# Patient Record
Sex: FEMALE | Race: BLACK OR AFRICAN AMERICAN | Employment: OTHER | URBAN - METROPOLITAN AREA
[De-identification: names, ages, dates, MRNs, and addresses within clinical notes are randomized per-mention and may not be internally consistent; named-entity substitution may affect disease eponyms.]

---

## 2017-05-25 ENCOUNTER — HOSPITAL ENCOUNTER (EMERGENCY)
Age: 82
Discharge: HOME | End: 2017-05-25
Payer: MEDICARE

## 2017-05-25 ENCOUNTER — APPOINTMENT (OUTPATIENT)
Dept: CT IMAGING | Age: 82
End: 2017-05-25
Payer: MEDICARE

## 2017-05-25 VITALS
HEART RATE: 62 BPM | RESPIRATION RATE: 16 BRPM | TEMPERATURE: 98.7 F | DIASTOLIC BLOOD PRESSURE: 77 MMHG | SYSTOLIC BLOOD PRESSURE: 158 MMHG | OXYGEN SATURATION: 100 %

## 2017-05-25 DIAGNOSIS — R31.9 HEMATURIA: Primary | ICD-10-CM

## 2017-05-25 LAB
ANION GAP BLD CALC-SCNC: 10 MMOL/L (ref 7–16)
APPEARANCE UR: ABNORMAL
APTT PPP: 24.7 SEC (ref 23.5–31.7)
BACTERIA URNS QL MICRO: 0 /HPF
BASOPHILS # BLD AUTO: 0 K/UL (ref 0–0.2)
BASOPHILS # BLD: 1 % (ref 0–2)
BILIRUB UR QL: NEGATIVE
BUN SERPL-MCNC: 16 MG/DL (ref 8–23)
CALCIUM SERPL-MCNC: 9.5 MG/DL (ref 8.3–10.4)
CASTS URNS QL MICRO: 0 /LPF
CHLORIDE SERPL-SCNC: 105 MMOL/L (ref 98–107)
CO2 SERPL-SCNC: 27 MMOL/L (ref 21–32)
COLOR UR: ABNORMAL
CREAT SERPL-MCNC: 1.67 MG/DL (ref 0.6–1)
DIFFERENTIAL METHOD BLD: ABNORMAL
EOSINOPHIL # BLD: 0 K/UL (ref 0–0.8)
EOSINOPHIL NFR BLD: 1 % (ref 0.5–7.8)
EPI CELLS #/AREA URNS HPF: ABNORMAL /HPF
ERYTHROCYTE [DISTWIDTH] IN BLOOD BY AUTOMATED COUNT: 14.3 % (ref 11.9–14.6)
GLUCOSE SERPL-MCNC: 83 MG/DL (ref 65–100)
GLUCOSE UR STRIP.AUTO-MCNC: NEGATIVE MG/DL
HCT VFR BLD AUTO: 35.7 % (ref 35.8–46.3)
HGB BLD-MCNC: 11.6 G/DL (ref 11.7–15.4)
HGB UR QL STRIP: ABNORMAL
IMM GRANULOCYTES # BLD: 0 K/UL (ref 0–0.5)
IMM GRANULOCYTES NFR BLD AUTO: 0.2 % (ref 0–5)
INR PPP: 1 (ref 0.9–1.2)
KETONES UR QL STRIP.AUTO: NEGATIVE MG/DL
LEUKOCYTE ESTERASE UR QL STRIP.AUTO: ABNORMAL
LYMPHOCYTES # BLD AUTO: 33 % (ref 13–44)
LYMPHOCYTES # BLD: 1.7 K/UL (ref 0.5–4.6)
MCH RBC QN AUTO: 28.4 PG (ref 26.1–32.9)
MCHC RBC AUTO-ENTMCNC: 32.5 G/DL (ref 31.4–35)
MCV RBC AUTO: 87.5 FL (ref 79.6–97.8)
MONOCYTES # BLD: 0.4 K/UL (ref 0.1–1.3)
MONOCYTES NFR BLD AUTO: 8 % (ref 4–12)
NEUTS SEG # BLD: 3 K/UL (ref 1.7–8.2)
NEUTS SEG NFR BLD AUTO: 57 % (ref 43–78)
NITRITE UR QL STRIP.AUTO: NEGATIVE
PH UR STRIP: 6 [PH] (ref 5–9)
PLATELET # BLD AUTO: 226 K/UL (ref 150–450)
PMV BLD AUTO: 11.1 FL (ref 10.8–14.1)
POTASSIUM SERPL-SCNC: 4.7 MMOL/L (ref 3.5–5.1)
PROT UR STRIP-MCNC: 100 MG/DL
PROTHROMBIN TIME: 10.4 SEC (ref 9.6–12)
RBC # BLD AUTO: 4.08 M/UL (ref 4.05–5.25)
RBC #/AREA URNS HPF: >100 /HPF
SODIUM SERPL-SCNC: 142 MMOL/L (ref 136–145)
SP GR UR REFRACTOMETRY: 1.01 (ref 1–1.02)
UROBILINOGEN UR QL STRIP.AUTO: 1 EU/DL (ref 0.2–1)
WBC # BLD AUTO: 5.2 K/UL (ref 4.3–11.1)
WBC URNS QL MICRO: ABNORMAL /HPF

## 2017-05-25 PROCEDURE — 74176 CT ABD & PELVIS W/O CONTRAST: CPT

## 2017-05-25 PROCEDURE — 85025 COMPLETE CBC W/AUTO DIFF WBC: CPT

## 2017-05-25 PROCEDURE — 81001 URINALYSIS AUTO W/SCOPE: CPT

## 2017-05-25 PROCEDURE — 80048 BASIC METABOLIC PNL TOTAL CA: CPT

## 2017-05-25 PROCEDURE — 99283 EMERGENCY DEPT VISIT LOW MDM: CPT

## 2017-05-25 PROCEDURE — 85610 PROTHROMBIN TIME: CPT

## 2017-05-25 PROCEDURE — 51701 INSERT BLADDER CATHETER: CPT

## 2017-05-25 PROCEDURE — 85730 THROMBOPLASTIN TIME PARTIAL: CPT

## 2017-05-25 NOTE — ED NOTES
Pt straight cathed for urine specimen. Pt bladder drained with approx 300 mL of urine out. When catheter was being removed blood was noted to be draining into cath tubing and urine collection bag.

## 2017-05-25 NOTE — ED TRIAGE NOTES
Pt states she is on bactrim from UTI diagnosed at Urgent care. She states she continues to have blood clots in urine. When asked if blood was coming from vagina or rectum pt isn't sure, states she knows when she pees that sometimes she has blood there.

## 2017-05-25 NOTE — DISCHARGE INSTRUCTIONS

## 2017-05-25 NOTE — ED PROVIDER NOTES
HPI Comments: 51-year-old female complaining of dysuria. Patient thinks she has another urinary tract infection. She was recently treated for urinary tract infection. She also has noted some blood in her urine. Patient is a 80 y.o. female presenting with urinary tract infection. The history is provided by the patient. Bladder Infection    This is a recurrent problem. The current episode started more than 2 days ago. The problem occurs every urination. The problem has not changed since onset. The quality of the pain is described as aching. The pain is at a severity of 5/10. The pain is moderate. There has been no fever. There is no history of pyelonephritis. Associated symptoms include hematuria and hesitancy. Pertinent negatives include no chills, no sweats and no nausea. She has tried antibiotics for the symptoms. Her past medical history is significant for recurrent UTIs. Past Medical History:   Diagnosis Date    Arthritis     CAD (coronary artery disease)     cholesterol    Endocrine disease     thyroid issues    Gastrointestinal disorder     IBS    Hypertension     Other ill-defined conditions     short term memory loss    Other ill-defined conditions     osteoporosis, sciatica; spinal fx    Psychiatric disorder     ANXIETY       Past Surgical History:   Procedure Laterality Date    CARDIAC SURG PROCEDURE UNLIST      ablation    HX HEENT      laser surgery on eyes    HX HYSTERECTOMY      tubal          History reviewed. No pertinent family history. Social History     Social History    Marital status:      Spouse name: N/A    Number of children: N/A    Years of education: N/A     Occupational History    Not on file.      Social History Main Topics    Smoking status: Never Smoker    Smokeless tobacco: Never Used    Alcohol use No    Drug use: No    Sexual activity: No     Other Topics Concern    Not on file     Social History Narrative         ALLERGIES: Review of patient's allergies indicates no known allergies. Review of Systems   Constitutional: Negative. Negative for activity change and chills. HENT: Negative. Eyes: Negative. Respiratory: Negative. Cardiovascular: Negative. Gastrointestinal: Negative. Negative for nausea. Genitourinary: Positive for hematuria and hesitancy. Musculoskeletal: Negative. Skin: Negative. Neurological: Negative. Psychiatric/Behavioral: Negative. All other systems reviewed and are negative. Vitals:    05/25/17 1136 05/25/17 1140   BP: 160/78 157/74   Pulse: 64    Resp: 16 16   Temp: 98.9 °F (37.2 °C)    SpO2: 99% 99%            Physical Exam   Constitutional: She is oriented to person, place, and time. She appears well-developed and well-nourished. No distress. HENT:   Head: Normocephalic and atraumatic. Right Ear: External ear normal.   Left Ear: External ear normal.   Nose: Nose normal.   Mouth/Throat: Oropharynx is clear and moist. No oropharyngeal exudate. Eyes: Conjunctivae and EOM are normal. Pupils are equal, round, and reactive to light. Right eye exhibits no discharge. Left eye exhibits no discharge. No scleral icterus. Neck: Normal range of motion. Neck supple. No JVD present. No tracheal deviation present. Cardiovascular: Normal rate, regular rhythm and intact distal pulses. Pulmonary/Chest: Effort normal and breath sounds normal. No stridor. No respiratory distress. She has no wheezes. She exhibits no tenderness. Abdominal: Soft. Bowel sounds are normal. She exhibits no distension and no mass. There is tenderness in the suprapubic area. There is no rigidity, no rebound, no guarding, no tenderness at McBurney's point and negative Marmolejo's sign. Musculoskeletal: Normal range of motion. She exhibits no edema or tenderness. Neurological: She is alert and oriented to person, place, and time. No cranial nerve deficit. Skin: Skin is warm and dry. No rash noted.  She is not diaphoretic. No erythema. No pallor. Psychiatric: She has a normal mood and affect. Her behavior is normal. Thought content normal.   Nursing note and vitals reviewed. MDM  Number of Diagnoses or Management Options  Hematuria:   Diagnosis management comments: Hematuria and recurrent UTIs referred to urology.        Amount and/or Complexity of Data Reviewed  Clinical lab tests: ordered and reviewed      ED Course       Procedures

## 2017-05-25 NOTE — ED NOTES
Pt in bathroom, states it's going to be a while before she is out. She is attempting to provide urine sample. Pt advised that she could go to room and try again in a little bit.

## 2017-06-13 ENCOUNTER — HOSPITAL ENCOUNTER (OUTPATIENT)
Dept: SURGERY | Age: 82
Discharge: HOME | End: 2017-06-13
Payer: MEDICARE

## 2017-06-13 VITALS
TEMPERATURE: 98.6 F | BODY MASS INDEX: 28.2 KG/M2 | HEART RATE: 68 BPM | WEIGHT: 165.19 LBS | DIASTOLIC BLOOD PRESSURE: 93 MMHG | SYSTOLIC BLOOD PRESSURE: 212 MMHG | RESPIRATION RATE: 16 BRPM | HEIGHT: 64 IN | OXYGEN SATURATION: 99 %

## 2017-06-13 LAB
ANION GAP BLD CALC-SCNC: 6 MMOL/L (ref 7–16)
BUN SERPL-MCNC: 14 MG/DL (ref 8–23)
CALCIUM SERPL-MCNC: 9.3 MG/DL (ref 8.3–10.4)
CHLORIDE SERPL-SCNC: 105 MMOL/L (ref 98–107)
CO2 SERPL-SCNC: 34 MMOL/L (ref 21–32)
CREAT SERPL-MCNC: 1.22 MG/DL (ref 0.6–1)
ERYTHROCYTE [DISTWIDTH] IN BLOOD BY AUTOMATED COUNT: 14.4 % (ref 11.9–14.6)
GLUCOSE SERPL-MCNC: 119 MG/DL (ref 65–100)
HCT VFR BLD AUTO: 34.3 % (ref 35.8–46.3)
HGB BLD-MCNC: 11 G/DL (ref 11.7–15.4)
INR PPP: 1 (ref 0.9–1.2)
MCH RBC QN AUTO: 28.1 PG (ref 26.1–32.9)
MCHC RBC AUTO-ENTMCNC: 32.1 G/DL (ref 31.4–35)
MCV RBC AUTO: 87.7 FL (ref 79.6–97.8)
PLATELET # BLD AUTO: 238 K/UL (ref 150–450)
PMV BLD AUTO: 10.6 FL (ref 10.8–14.1)
POTASSIUM SERPL-SCNC: 4.2 MMOL/L (ref 3.5–5.1)
PROTHROMBIN TIME: 10.4 SEC (ref 9.6–12)
RBC # BLD AUTO: 3.91 M/UL (ref 4.05–5.25)
SODIUM SERPL-SCNC: 145 MMOL/L (ref 136–145)
WBC # BLD AUTO: 6.1 K/UL (ref 4.3–11.1)

## 2017-06-13 PROCEDURE — 80048 BASIC METABOLIC PNL TOTAL CA: CPT | Performed by: UROLOGY

## 2017-06-13 PROCEDURE — 85027 COMPLETE CBC AUTOMATED: CPT | Performed by: UROLOGY

## 2017-06-13 PROCEDURE — 85610 PROTHROMBIN TIME: CPT | Performed by: UROLOGY

## 2017-06-13 RX ORDER — DONEPEZIL HYDROCHLORIDE 5 MG/1
5 TABLET, FILM COATED ORAL
COMMUNITY
End: 2020-05-20

## 2017-06-13 NOTE — PERIOP NOTES
Patient verified name, , and surgery as listed in [facility]. TYPE  CASE:1b  Orders per surgeon: were Received  Labs per surgeon:CBC,BMP,INR  Labs per anesthesia protocol: no added  EKG  :  14      Patient provided with handouts including guide to surgery , transfusions, pain management and hand hygiene for the family and community. Pt verbalizes understanding of all pre-op instructions . Instructed that family must be present in building at all times. Nothing to eat or drink after midnight the night prior to surgery. Hibiclens and instructions given per hospital policy. Instructed patient to continue  previous medications as prescribed prior to surgery and  to take the following medications the day of surgery according to anesthesia guidelines : Amlodipine, Gabapentin, Levothyroxine, Metoprolol, Venlafaxine. Original medication prescription bottles were visualized during patient appointment. Continue all previous medications unless otherwise directed. Instructed patient to hold  the following medications prior to surgery: none      Patient verbalized understanding of all instructions and provided all medical/health information to the best of their ability.

## 2017-06-13 NOTE — PERIOP NOTES
Patient forgot to take her blood pressure medication this morning with her initial blood pressure reading of 212/93. After an hour in preassessment, patient's blood pressure trending down to 175/96. Patient states she will monitor blood pressure tonight and over the next couple of days and will see her Primary Physician if her blood pressure remains elevated.

## 2017-06-19 ENCOUNTER — ANESTHESIA EVENT (OUTPATIENT)
Dept: SURGERY | Age: 82
End: 2017-06-19
Payer: MEDICARE

## 2017-06-20 ENCOUNTER — ANESTHESIA (OUTPATIENT)
Dept: SURGERY | Age: 82
End: 2017-06-20
Payer: MEDICARE

## 2017-06-20 ENCOUNTER — HOSPITAL ENCOUNTER (OUTPATIENT)
Age: 82
Setting detail: OUTPATIENT SURGERY
Discharge: HOME | End: 2017-06-20
Attending: UROLOGY | Admitting: UROLOGY
Payer: MEDICARE

## 2017-06-20 VITALS
SYSTOLIC BLOOD PRESSURE: 179 MMHG | OXYGEN SATURATION: 95 % | HEIGHT: 64 IN | HEART RATE: 76 BPM | RESPIRATION RATE: 12 BRPM | DIASTOLIC BLOOD PRESSURE: 76 MMHG | WEIGHT: 163.38 LBS | TEMPERATURE: 97.9 F | BODY MASS INDEX: 27.89 KG/M2

## 2017-06-20 PROCEDURE — 76210000006 HC OR PH I REC 0.5 TO 1 HR: Performed by: UROLOGY

## 2017-06-20 PROCEDURE — 76210000020 HC REC RM PH II FIRST 0.5 HR: Performed by: UROLOGY

## 2017-06-20 PROCEDURE — 77030018842 HC SOL IRR SOD CL 9% BAXT -A: Performed by: UROLOGY

## 2017-06-20 PROCEDURE — 77030020782 HC GWN BAIR PAWS FLX 3M -B: Performed by: NURSE ANESTHETIST, CERTIFIED REGISTERED

## 2017-06-20 PROCEDURE — 76060000032 HC ANESTHESIA 0.5 TO 1 HR: Performed by: UROLOGY

## 2017-06-20 PROCEDURE — 74011250636 HC RX REV CODE- 250/636

## 2017-06-20 PROCEDURE — 88112 CYTOPATH CELL ENHANCE TECH: CPT | Performed by: UROLOGY

## 2017-06-20 PROCEDURE — 77030032490 HC SLV COMPR SCD KNE COVD -B: Performed by: UROLOGY

## 2017-06-20 PROCEDURE — 77030018832 HC SOL IRR H20 ICUM -A: Performed by: UROLOGY

## 2017-06-20 PROCEDURE — 74011250636 HC RX REV CODE- 250/636: Performed by: UROLOGY

## 2017-06-20 PROCEDURE — 74011250636 HC RX REV CODE- 250/636: Performed by: ANESTHESIOLOGY

## 2017-06-20 PROCEDURE — 74011636320 HC RX REV CODE- 636/320: Performed by: UROLOGY

## 2017-06-20 PROCEDURE — 74011000250 HC RX REV CODE- 250

## 2017-06-20 PROCEDURE — 76010000138 HC OR TIME 0.5 TO 1 HR: Performed by: UROLOGY

## 2017-06-20 PROCEDURE — 77030020143 HC AIRWY LARYN INTUB CGAS -A: Performed by: NURSE ANESTHETIST, CERTIFIED REGISTERED

## 2017-06-20 PROCEDURE — C1758 CATHETER, URETERAL: HCPCS | Performed by: UROLOGY

## 2017-06-20 RX ORDER — MIDAZOLAM HYDROCHLORIDE 1 MG/ML
2 INJECTION, SOLUTION INTRAMUSCULAR; INTRAVENOUS ONCE
Status: DISCONTINUED | OUTPATIENT
Start: 2017-06-20 | End: 2017-06-20 | Stop reason: HOSPADM

## 2017-06-20 RX ORDER — ALBUTEROL SULFATE 0.83 MG/ML
2.5 SOLUTION RESPIRATORY (INHALATION) AS NEEDED
Status: DISCONTINUED | OUTPATIENT
Start: 2017-06-20 | End: 2017-06-20 | Stop reason: HOSPADM

## 2017-06-20 RX ORDER — SODIUM CHLORIDE 0.9 % (FLUSH) 0.9 %
5-10 SYRINGE (ML) INJECTION AS NEEDED
Status: DISCONTINUED | OUTPATIENT
Start: 2017-06-20 | End: 2017-06-20 | Stop reason: HOSPADM

## 2017-06-20 RX ORDER — OXYCODONE HYDROCHLORIDE 5 MG/1
5 TABLET ORAL
Status: DISCONTINUED | OUTPATIENT
Start: 2017-06-20 | End: 2017-06-20 | Stop reason: HOSPADM

## 2017-06-20 RX ORDER — LIDOCAINE HYDROCHLORIDE 20 MG/ML
INJECTION, SOLUTION EPIDURAL; INFILTRATION; INTRACAUDAL; PERINEURAL AS NEEDED
Status: DISCONTINUED | OUTPATIENT
Start: 2017-06-20 | End: 2017-06-20 | Stop reason: HOSPADM

## 2017-06-20 RX ORDER — CEPHALEXIN 500 MG/1
500 CAPSULE ORAL 2 TIMES DAILY
Qty: 10 CAP | Refills: 0 | Status: SHIPPED | OUTPATIENT
Start: 2017-06-20 | End: 2017-06-25

## 2017-06-20 RX ORDER — CELECOXIB 200 MG/1
200 CAPSULE ORAL
Status: DISCONTINUED | OUTPATIENT
Start: 2017-06-20 | End: 2017-06-20

## 2017-06-20 RX ORDER — HYDROMORPHONE HYDROCHLORIDE 2 MG/ML
0.5 INJECTION, SOLUTION INTRAMUSCULAR; INTRAVENOUS; SUBCUTANEOUS
Status: DISCONTINUED | OUTPATIENT
Start: 2017-06-20 | End: 2017-06-20 | Stop reason: HOSPADM

## 2017-06-20 RX ORDER — FENTANYL CITRATE 50 UG/ML
100 INJECTION, SOLUTION INTRAMUSCULAR; INTRAVENOUS ONCE
Status: DISCONTINUED | OUTPATIENT
Start: 2017-06-20 | End: 2017-06-20 | Stop reason: HOSPADM

## 2017-06-20 RX ORDER — SODIUM CHLORIDE, SODIUM LACTATE, POTASSIUM CHLORIDE, CALCIUM CHLORIDE 600; 310; 30; 20 MG/100ML; MG/100ML; MG/100ML; MG/100ML
50 INJECTION, SOLUTION INTRAVENOUS CONTINUOUS
Status: DISCONTINUED | OUTPATIENT
Start: 2017-06-20 | End: 2017-06-20 | Stop reason: HOSPADM

## 2017-06-20 RX ORDER — CEFAZOLIN SODIUM IN 0.9 % NACL 2 G/50 ML
2 INTRAVENOUS SOLUTION, PIGGYBACK (ML) INTRAVENOUS ONCE
Status: COMPLETED | OUTPATIENT
Start: 2017-06-20 | End: 2017-06-20

## 2017-06-20 RX ORDER — PROPOFOL 10 MG/ML
INJECTION, EMULSION INTRAVENOUS AS NEEDED
Status: DISCONTINUED | OUTPATIENT
Start: 2017-06-20 | End: 2017-06-20 | Stop reason: HOSPADM

## 2017-06-20 RX ORDER — ONDANSETRON 2 MG/ML
INJECTION INTRAMUSCULAR; INTRAVENOUS AS NEEDED
Status: DISCONTINUED | OUTPATIENT
Start: 2017-06-20 | End: 2017-06-20 | Stop reason: HOSPADM

## 2017-06-20 RX ORDER — SODIUM CHLORIDE 0.9 % (FLUSH) 0.9 %
5-10 SYRINGE (ML) INJECTION EVERY 8 HOURS
Status: DISCONTINUED | OUTPATIENT
Start: 2017-06-20 | End: 2017-06-20 | Stop reason: HOSPADM

## 2017-06-20 RX ORDER — SODIUM CHLORIDE, SODIUM LACTATE, POTASSIUM CHLORIDE, CALCIUM CHLORIDE 600; 310; 30; 20 MG/100ML; MG/100ML; MG/100ML; MG/100ML
75 INJECTION, SOLUTION INTRAVENOUS CONTINUOUS
Status: DISCONTINUED | OUTPATIENT
Start: 2017-06-20 | End: 2017-06-20 | Stop reason: HOSPADM

## 2017-06-20 RX ORDER — LIDOCAINE HYDROCHLORIDE 10 MG/ML
0.1 INJECTION INFILTRATION; PERINEURAL AS NEEDED
Status: DISCONTINUED | OUTPATIENT
Start: 2017-06-20 | End: 2017-06-20 | Stop reason: HOSPADM

## 2017-06-20 RX ORDER — MIDAZOLAM HYDROCHLORIDE 1 MG/ML
2 INJECTION, SOLUTION INTRAMUSCULAR; INTRAVENOUS
Status: DISCONTINUED | OUTPATIENT
Start: 2017-06-20 | End: 2017-06-20 | Stop reason: HOSPADM

## 2017-06-20 RX ADMIN — SODIUM CHLORIDE, SODIUM LACTATE, POTASSIUM CHLORIDE, AND CALCIUM CHLORIDE 75 ML/HR: 600; 310; 30; 20 INJECTION, SOLUTION INTRAVENOUS at 13:57

## 2017-06-20 RX ADMIN — PROPOFOL 150 MG: 10 INJECTION, EMULSION INTRAVENOUS at 15:51

## 2017-06-20 RX ADMIN — LIDOCAINE HYDROCHLORIDE 60 MG: 20 INJECTION, SOLUTION EPIDURAL; INFILTRATION; INTRACAUDAL; PERINEURAL at 15:51

## 2017-06-20 RX ADMIN — CEFAZOLIN 2 G: 1 INJECTION, POWDER, FOR SOLUTION INTRAMUSCULAR; INTRAVENOUS; PARENTERAL at 15:47

## 2017-06-20 RX ADMIN — ONDANSETRON 4 MG: 2 INJECTION INTRAMUSCULAR; INTRAVENOUS at 15:56

## 2017-06-20 NOTE — ANESTHESIA POSTPROCEDURE EVALUATION
Post-Anesthesia Evaluation and Assessment    Patient: Gene Mcdaniel MRN: [id number]  SSN: [id number]    YOB: 1929  Age: 80 y.o. Sex: female       Cardiovascular Function/Vital Signs  Visit Vitals    /83    Pulse 71    Temp 36.6 °C (97.9 °F)    Resp 12    Ht 5' 4\" (1.626 m)    Wt 74.1 kg (163 lb 6 oz)    SpO2 96%    BMI 28.04 kg/m2       Patient is status post general anesthesia for Procedure(s):  CYSTOSCOPY WITH BILATERAL RETROGRADE PYELOGRAM/ BILATERAL URETEROSCOPY WITH URETERAL WASHING. Nausea/Vomiting: None    Postoperative hydration reviewed and adequate. Pain:  Pain Scale 1: Numeric (0 - 10) (06/20/17 1642)  Pain Intensity 1: 0 (06/20/17 1642)   Managed    Neurological Status:   Neuro (WDL): Within Defined Limits (06/20/17 1635)   At baseline    Mental Status and Level of Consciousness: Arousable    Pulmonary Status:   O2 Device: Nasal cannula (06/20/17 1635)   Adequate oxygenation and airway patent    Complications related to anesthesia: None    Post-anesthesia assessment completed.  No concerns    Signed By: Candido Nicole MD     June 20, 2017

## 2017-06-20 NOTE — DISCHARGE INSTRUCTIONS
Cystoscopy: What to Expect at [address] [facility]  A cystoscopy is a procedure that lets a doctor look inside of the bladder and the urethra. The urethra is the tube that carries urine from the bladder to outside the body. The doctor uses a thin, lighted tube called a cystoscope to look for kidney or bladder stones, tumors, bleeding, or infection. After the cystoscopy, your urethra may be sore at first, and it may burn when you urinate for the first few days after the procedure. You may feel the need to urinate more often, and your urine may be pink. These symptoms should get better in 1 or 2 days. You will probably be able to go back to work or most of your usual activities in 1 or 2 days. This care sheet gives you a general idea about how long it will take for you to recover. But each person recovers at a different pace. Follow the steps below to get better as quickly as possible. How can you care for yourself at home? Activity  1. Rest when you feel tired. Getting enough sleep will help you recover. 2. Try to walk each day. Start by walking a little more than you did the day before. Bit by bit, increase the amount you walk. Walking boosts blood flow and helps prevent pneumonia and constipation. 3. Avoid strenuous activities, such as bicycle riding, jogging, weight lifting, or aerobic exercise, until your doctor says it is okay. 4. Ask your doctor when you can drive again. 5. Most people are able to return to work within 1 or 2 days after the procedure. 6. You may shower and take baths as usual.  7. Ask your doctor when it is okay for you to have sex. Diet  · You can eat your normal diet. If your stomach is upset, try bland, low-fat foods like plain rice, broiled chicken, toast, and yogurt. · Drink plenty of fluids (unless your doctor tells you not to). Medicines  · Take pain medicines exactly as directed. ¨ If the doctor gave you a prescription medicine for pain, take it as prescribed.   ¨ If you are not taking a prescription pain medicine, ask your doctor if you can take an over-the-counter medicine. · If you think your pain medicine is making you sick to your stomach:  ¨ Take your medicine after meals (unless your doctor has told you not to). ¨ Ask your doctor for a different pain medicine. · If your doctor prescribed antibiotics, take them as directed. Do not stop taking them just because you feel better. You need to take the full course of antibiotics. Follow-up care is a key part of your treatment and safety. Be sure to make and go to all appointments, and call your doctor if you are having problems. It's also a good idea to know your test results and keep a list of the medicines you take. When should you call for help? Call 911 anytime you think you may need emergency care. For example, call if:  · You passed out (lost consciousness). · You have severe trouble breathing. · You have sudden chest pain and shortness of breath, or you cough up blood. · You have severe belly pain. Call your doctor now or seek immediate medical care if:  · You are sick to your stomach or cannot keep fluids down. · Your urine is still red or you see blood clots after you have urinated several times. · You have trouble passing urine or stool, especially if you have pain or swelling in your lower belly. · You have signs of a blood clot, such as:  ¨ Pain in your calf, back of the knee, thigh, or groin. ¨ Redness and swelling in your leg or groin. · You develop a fever or severe chills. · You have pain in your back just below your rib cage. This is called flank pain. Watch closely for changes in your health, and be sure to contact your doctor if:  · You have pain or burning when you urinate. A burning feeling is normal for a day or two after the test, but call if it does not get better. · You have a frequent urge to urinate but can pass only small amounts of urine. Your urine is pink, red, or cloudy, or smells bad. It is normal for the urine to have a pinkish color for a few days after the test, but call if it does not get better. After general anesthesia or intravenous sedation, for 24 hours or while taking prescription Narcotics:  · Limit your activities  · Do not drive and operate hazardous machinery  · Do not make important personal or business decisions  · Do  not drink alcoholic beverages  · If you have not urinated within 8 hours after discharge, please contact your surgeon on call. *  Please give a list of your current medications to your Primary Care Provider. *  Please update this list whenever your medications are discontinued, doses are      changed, or new medications (including over-the-counter products) are added. *  Please carry medication information at all times in case of emergency situations. These are general instructions for a healthy lifestyle:  No smoking/ No tobacco products/ Avoid exposure to second hand smoke  Surgeon General's Warning:  Quitting smoking now greatly reduces serious risk to your health. Obesity, smoking, and sedentary lifestyle greatly increases your risk for illness  A healthy diet, regular physical exercise & weight monitoring are important for maintaining a healthy lifestyle    You may be retaining fluid if you have a history of heart failure or if you experience any of the following symptoms:  Weight gain of 3 pounds or more overnight or 5 pounds in a week, increased swelling in our hands or feet or shortness of breath while lying flat in bed. Please call your doctor as soon as you notice any of these symptoms; do not wait until your next office visit. Recognize signs and symptoms of STROKE:    F-face looks uneven    A-arms unable to move or move unevenly    S-speech slurred or non-existent    T-time-call 911 as soon as signs and symptoms begin-DO NOT go       Back to bed or wait to see if you get better-TIME IS BRAIN.

## 2017-06-20 NOTE — IP AVS SNAPSHOT
Arnaldo Fairchild 
 
 
 [address] [facility] [address] 
[phone] Patient: Nakia Lezama MRN: [id number] JML:1[birth year]013 You are allergic to the following No active allergies Recent Documentation Height Weight BMI OB Status Smoking Status 1.626 m 74.1 kg 28.04 kg/m2 Hysterectomy Never Smoker Emergency Contacts Name Discharge Info Relation Home Work Mobile Micheal Frederick  Child [2] [phone] Ludin Olsen  Child [2] [phone] Blackwood,Kiley DISCHARGE CAREGIVER [3] Daughter [21] About your hospitalization You were admitted on:  June 20, 2017 You last received care in the:  [facility] PACU You were discharged on:  June 20, 2017 Unit phone number:  [phone] Why you were hospitalized Your primary diagnosis was:  Not on File Providers Seen During Your Hospitalizations Provider Role Specialty Primary office phone Michael Gurrola MD Attending Provider Urology [phone] Your Primary Care Physician (PCP) Primary Care Physician Office Phone Office Fax Monica Mendoza [phone] Follow-up Information Follow up With Details Comments Contact Info Michael Gurrola MD Schedule an appointment as soon as possible for a visit in 1 month(s) With Nurse Practitioner  [address] [place] [address] 
[phone] Sabrina Agosto MD   [address] [facility] Internal Medicine an 
[place] [address] 
[phone] Current Discharge Medication List  
  
START taking these medications Dose & Instructions Dispensing Information Comments Morning Noon Evening Bedtime  
 cephALEXin 500 mg capsule Commonly known as:  Zac Wilson Your last dose was: Your next dose is:    
   
   
 Dose:  500 mg Take 1 Cap by mouth two (2) times a day for 5 days. Quantity:  10 Cap Refills:  0 CONTINUE these medications which have NOT CHANGED Dose & Instructions Dispensing Information Comments Morning Noon Evening Bedtime  
 amLODIPine 5 mg tablet Commonly known as:  Latasha Caldwell Your last dose was: Your next dose is:    
   
   
 Dose:  5 mg Take 5 mg by mouth every morning. Refills:  0  
     
   
   
   
  
 ARICEPT 5 mg tablet Generic drug:  donepezil Your last dose was: Your next dose is:    
   
   
 Dose:  5 mg Take 5 mg by mouth nightly. Refills:  0  
     
   
   
   
  
 ergocalciferol 50,000 unit capsule Commonly known as:  ERGOCALCIFEROL Your last dose was: Your next dose is: TAKE ONE CAPSULE BY MOUTH EACH WEEK Refills:  0  
     
   
   
   
  
 levothyroxine 50 mcg tablet Commonly known as:  SYNTHROID Your last dose was: Your next dose is: TAKE 1 TABLET BY MOUTH EVERY MORNING ON AN EMPTY STOMACH Refills:  0 LIPITOR 40 mg tablet Generic drug:  atorvastatin Your last dose was: Your next dose is:    
   
   
 Dose:  40 mg Take 40 mg by mouth nightly. Refills:  0  
     
   
   
   
  
 lisinopril 20 mg tablet Commonly known as:  Zach Estrada Your last dose was: Your next dose is:    
   
   
 1 TABLET TWICE A DAY (BID) FOR BLOOD PRESSURE ORALLY 90 DAYS Refills:  0  
     
   
   
   
  
 metoprolol succinate 50 mg XL tablet Commonly known as:  TOPROL-XL Your last dose was: Your next dose is:    
   
   
 1 TABLET ONCE A DAY FOR BLOOD PRESSURE ORALLY 90 DAYS Refills:  0 NEURONTIN 300 mg capsule Generic drug:  gabapentin Your last dose was: Your next dose is:    
   
   
 Dose:  300 mg Take 300 mg by mouth two (2) times a day. Refills:  0  
     
   
   
   
  
 venlafaxine 37.5 mg tablet Commonly known as:  [facility] Your last dose was: Your next dose is:    
   
   
 Dose:  37.5 mg Take 37.5 mg by mouth two (2) times a day. Refills:  0 Where to Get Your Medications Information on where to get these meds will be given to you by the nurse or doctor. ! Ask your nurse or doctor about these medications  
  cephALEXin 500 mg capsule Discharge Instructions Cystoscopy: What to Expect at [facility] Your Recovery A cystoscopy is a procedure that lets a doctor look inside of the bladder and the urethra. The urethra is the tube that carries urine from the bladder to outside the body. The doctor uses a thin, lighted tube called a cystoscope to look for kidney or bladder stones, tumors, bleeding, or infection. After the cystoscopy, your urethra may be sore at first, and it may burn when you urinate for the first few days after the procedure. You may feel the need to urinate more often, and your urine may be pink. These symptoms should get better in 1 or 2 days. You will probably be able to go back to work or most of your usual activities in 1 or 2 days. This care sheet gives you a general idea about how long it will take for you to recover. But each person recovers at a different pace. Follow the steps below to get better as quickly as possible. How can you care for yourself at home? Activity 1. Rest when you feel tired. Getting enough sleep will help you recover. 2. Try to walk each day. Start by walking a little more than you did the day before. Bit by bit, increase the amount you walk. Walking boosts blood flow and helps prevent pneumonia and constipation. 3. Avoid strenuous activities, such as bicycle riding, jogging, weight lifting, or aerobic exercise, until your doctor says it is okay. 4. Ask your doctor when you can drive again. 5. Most people are able to return to work within 1 or 2 days after the procedure.  
6. You may shower and take baths as usual. 
 7. Ask your doctor when it is okay for you to have sex. Diet · You can eat your normal diet. If your stomach is upset, try bland, low-fat foods like plain rice, broiled chicken, toast, and yogurt. · Drink plenty of fluids (unless your doctor tells you not to). Medicines · Take pain medicines exactly as directed. ¨ If the doctor gave you a prescription medicine for pain, take it as prescribed. ¨ If you are not taking a prescription pain medicine, ask your doctor if you can take an over-the-counter medicine. · If you think your pain medicine is making you sick to your stomach: 
¨ Take your medicine after meals (unless your doctor has told you not to). ¨ Ask your doctor for a different pain medicine. · If your doctor prescribed antibiotics, take them as directed. Do not stop taking them just because you feel better. You need to take the full course of antibiotics. Follow-up care is a key part of your treatment and safety. Be sure to make and go to all appointments, and call your doctor if you are having problems. It's also a good idea to know your test results and keep a list of the medicines you take. When should you call for help? Call 911 anytime you think you may need emergency care. For example, call if: 
· You passed out (lost consciousness). · You have severe trouble breathing. · You have sudden chest pain and shortness of breath, or you cough up blood. · You have severe belly pain. Call your doctor now or seek immediate medical care if: 
· You are sick to your stomach or cannot keep fluids down. · Your urine is still red or you see blood clots after you have urinated several times. · You have trouble passing urine or stool, especially if you have pain or swelling in your lower belly. · You have signs of a blood clot, such as: 
¨ Pain in your calf, back of the knee, thigh, or groin. ¨ Redness and swelling in your leg or groin. · You develop a fever or severe chills. · You have pain in your back just below your rib cage. This is called flank pain. Watch closely for changes in your health, and be sure to contact your doctor if: 
· You have pain or burning when you urinate. A burning feeling is normal for a day or two after the test, but call if it does not get better. · You have a frequent urge to urinate but can pass only small amounts of urine. Your urine is pink, red, or cloudy, or smells bad. It is normal for the urine to have a pinkish color for a few days after the test, but call if it does not get better. After general anesthesia or intravenous sedation, for 24 hours or while taking prescription Narcotics: · Limit your activities · Do not drive and operate hazardous machinery · Do not make important personal or business decisions · Do  not drink alcoholic beverages · If you have not urinated within 8 hours after discharge, please contact your surgeon on call. *  Please give a list of your current medications to your Primary Care Provider. *  Please update this list whenever your medications are discontinued, doses are 
    changed, or new medications (including over-the-counter products) are added. *  Please carry medication information at all times in case of emergency situations. These are general instructions for a healthy lifestyle: No smoking/ No tobacco products/ Avoid exposure to second hand smoke Surgeon General's Warning:  Quitting smoking now greatly reduces serious risk to your health. Obesity, smoking, and sedentary lifestyle greatly increases your risk for illness A healthy diet, regular physical exercise & weight monitoring are important for maintaining a healthy lifestyle You may be retaining fluid if you have a history of heart failure or if you experience any of the following symptoms:  Weight gain of 3 pounds or more overnight or 5 pounds in a week, increased swelling in our hands or feet or shortness of breath while lying flat in bed. Please call your doctor as soon as you notice any of these symptoms; do not wait until your next office visit. Recognize signs and symptoms of STROKE: 
 
F-face looks uneven A-arms unable to move or move unevenly S-speech slurred or non-existent T-time-call 911 as soon as signs and symptoms begin-DO NOT go Back to bed or wait to see if you get better-TIME IS BRAIN. Discharge Orders None Introducing [facility]! [employer] introduces [employer] patient portal. Now you can access parts of your medical record, email your doctor's office, and request medication refills online. 1. In your internet browser, go to [email or web address] 2. Click on the First Time User? Click Here link in the Sign In box. You will see the New Member Sign Up page. 3. Enter your [employer] Access Code exactly as it appears below. You will not need to use this code after youve completed the sign-up process. If you do not sign up before the expiration date, you must request a new code. · [employer] Access Code: [email or web address] Expires: 8/23/2017  9:28 AM 
 
4. Enter the last four digits of your Social Security Number (xxxx) and Date of Birth (mm/dd/yyyy) as indicated and click Submit. You will be taken to the next sign-up page. 5. Create a [employer] ID. This will be your [employer] login ID and cannot be changed, so think of one that is secure and easy to remember. 6. Create a [employer] password. You can change your password at any time. 7. Enter your Password Reset Question and Answer. This can be used at a later time if you forget your password. 8. Enter your e-mail address. You will receive e-mail notification when new information is available in [address] 9. Click Sign Up. You can now view and download portions of your medical record. 10. Click the Download Summary menu link to download a portable copy of your medical information. If you have questions, please visit the Frequently Asked Questions section of the MyChart website. Remember, MyChart is NOT to be used for urgent needs. For medical emergencies, dial 911. Now available from your iPhone and Android! General Information Please provide this summary of care documentation to your next provider. Patient Signature:  ____________________________________________________________ Date:  ____________________________________________________________  
  
Christia Sicilian Provider Signature:  ____________________________________________________________ Date:  ____________________________________________________________

## 2017-06-20 NOTE — BRIEF OP NOTE
BRIEF OPERATIVE NOTE    Date of Procedure: 6/20/2017   Preoperative Diagnosis: Gross hematuria [R31.0]  Postoperative Diagnosis: Gross hematuria [R31.0]    Procedure(s):  CYSTOSCOPY WITH BILATERAL URETERAL/RENAL WASSHINGS FOR CYTOLOGY,  BILATERAL RETROGRADE PYELOGRAM/  Surgeon(s) and Role:     * Althea Peña MD - Primary         Assistant Staff:       Surgical Staff:  Circ-1: April Daley RN  Event Time In   Incision Start 1604   Incision Close 1621     Anesthesia: General   Estimated Blood Loss: NONE  Specimens:   ID Type Source Tests Collected by Time Destination   1 : URINE Fresh Bladder  Althea Peña MD 6/20/2017 1604 Cytology   2 : RIGHT KIDNEY WASHINGS Fresh Kidney  Althea Peña MD 6/20/2017 1613 Cytology   3 : LEFT KIDNEY WASHINGS Fresh Kidney  Althea Peña MD 6/20/2017 1619 Cytology      Findings: NOTE   Complications: NONE  Implants: * No implants in log *

## 2017-06-20 NOTE — ANESTHESIA PREPROCEDURE EVALUATION
Anesthetic History   No history of anesthetic complications            Review of Systems / Medical History  Patient summary reviewed, nursing notes reviewed and pertinent labs reviewed    Pulmonary        Sleep apnea: CPAP           Neuro/Psych         Psychiatric history (anxiety)    Comments: Memory loss Cardiovascular    Hypertension          Hyperlipidemia    Exercise tolerance: >4 METS     GI/Hepatic/Renal  Within defined limits              Endo/Other      Hypothyroidism: well controlled  Anemia     Other Findings              Physical Exam    Airway  Mallampati: III      Mouth opening: Normal     Cardiovascular  Regular rate and rhythm,  S1 and S2 normal,  no murmur, click, rub, or gallop             Dental    Dentition: Full lower dentures and Full upper dentures     Pulmonary  Breath sounds clear to auscultation               Abdominal         Other Findings            Anesthetic Plan    ASA: 3  Anesthesia type: general          Induction: Intravenous  Anesthetic plan and risks discussed with: Patient

## 2017-06-21 ENCOUNTER — HOSPITAL ENCOUNTER (OUTPATIENT)
Dept: GENERAL RADIOLOGY | Age: 82
Discharge: HOME | End: 2017-06-21
Attending: UROLOGY
Payer: MEDICARE

## 2017-06-21 DIAGNOSIS — R69 DIAGNOSIS UNKNOWN: ICD-10-CM

## 2017-06-21 PROCEDURE — 74420 UROGRAPHY RTRGR +-KUB: CPT

## 2017-06-21 NOTE — OP NOTES
[address] 65   OPERATIVE REPORT       Name:  Rory Mcwilliams   MR#:  [id number]   :  [id number]   Account #:  [de-identified]   Date of Adm:  2017       DATE OF PROCEDURE: 2017    PREOPERATIVE DIAGNOSES   1. Gross hematuria. 2. Normal CT scan and cystoscopy, cannot rule out a tumor in the   upper urinary tracts. POSTOPERATIVE DIAGNOSIS: Cytologic washings were taken from   bladder and both kidneys and retrograde pyelograms bilaterally   were normal.    NAME OF PROCEDURE: Cystoscopy, collection of urine and renal   washings for cytology, bilateral kidneys were sampled through   ureteral catheters, and bilateral retrograde pyelogram.    SURGEON: DAISY Olivarez MD     ANESTHESIA: General.    BLOOD LOSS: None. DESCRIPTION OF PROCEDURE: The patient was taken to the operating   room suite, underwent general anesthesia, placed in dorsal   lithotomy position, prepped with Betadine and draped in an   appropriate manner. She did have a small urethral caruncle that   might have been the source of the bleeding, but it was not   acutely inflamed. Upon entering the bladder, urine was collected   and sent off for cytology. The patient did have some   trabeculation of the bladder. The bladder was filled with the   irrigation fluid until both ureteral orifices were identified. A   4-Hebrew olive-tipped ureteral catheters were placed up into   each kidney, first on the left, then on the right. Then, using   normal saline, the kidney was flushed and aspirated out the   urine from each kidney. The urine was yellow-tinged on each side   from the kidney and the dark yellow urine was obtained from the   bladder. These were all sent separately. At this point, bilateral retrograde pyelograms were performed,   first on the right.  This was observed, the kidney had a delicate   collecting system and a normal ureter, and there was seen to be   peristalsis and drainage of the kidney without difficulty from the right side. No filling defects or irregularities were seen   in the collecting system or ureter. On the left side, a   retrograde pyelogram showed even a more delicate left kidney, no   dilatation of the renal pelvis, normal ureter, and it drained as   well. Several images were obtained and sent to the PACS system. The patient tolerated the procedure well, was sent to recovery   in stable condition. PLAN: The plan is to send her on about 5 days of antibiotics and   to follow up in about 1 month, check the urine with the nurse   practitioner. The patient will be sent home on 5 days of Keflex. No further evaluation is needed at this point, and the bleeding   might have been from the urethral caruncle, but no other    pathology is noted.         MD ANGEL Tao / Lokesh Padron   D:  06/20/2017   16:46   T:  06/21/2017   01:51   Job #:  [id number]

## 2017-10-23 ENCOUNTER — APPOINTMENT (OUTPATIENT)
Dept: CT IMAGING | Age: 82
End: 2017-10-23
Attending: EMERGENCY MEDICINE
Payer: MEDICARE

## 2017-10-23 ENCOUNTER — APPOINTMENT (OUTPATIENT)
Dept: GENERAL RADIOLOGY | Age: 82
End: 2017-10-23
Attending: EMERGENCY MEDICINE
Payer: MEDICARE

## 2017-10-23 ENCOUNTER — HOSPITAL ENCOUNTER (EMERGENCY)
Age: 82
Discharge: HOME | End: 2017-10-23
Attending: EMERGENCY MEDICINE
Payer: MEDICARE

## 2017-10-23 VITALS
HEIGHT: 64 IN | RESPIRATION RATE: 18 BRPM | OXYGEN SATURATION: 98 % | TEMPERATURE: 98 F | DIASTOLIC BLOOD PRESSURE: 74 MMHG | SYSTOLIC BLOOD PRESSURE: 169 MMHG | BODY MASS INDEX: 27.83 KG/M2 | WEIGHT: 163 LBS | HEART RATE: 70 BPM

## 2017-10-23 DIAGNOSIS — K29.90 GASTRITIS AND DUODENITIS: Primary | ICD-10-CM

## 2017-10-23 LAB
ALBUMIN SERPL-MCNC: 4 G/DL (ref 3.2–4.6)
ALBUMIN/GLOB SERPL: 1.1 {RATIO} (ref 1.2–3.5)
ALP SERPL-CCNC: 70 U/L (ref 50–136)
ALT SERPL-CCNC: 34 U/L (ref 12–65)
ANION GAP SERPL CALC-SCNC: 12 MMOL/L (ref 7–16)
AST SERPL-CCNC: 32 U/L (ref 15–37)
ATRIAL RATE: 59 BPM
BASOPHILS # BLD: 0 K/UL (ref 0–0.2)
BASOPHILS NFR BLD: 0 % (ref 0–2)
BILIRUB SERPL-MCNC: 0.6 MG/DL (ref 0.2–1.1)
BUN SERPL-MCNC: 12 MG/DL (ref 8–23)
CALCIUM SERPL-MCNC: 9.1 MG/DL (ref 8.3–10.4)
CALCULATED P AXIS, ECG09: 59 DEGREES
CALCULATED R AXIS, ECG10: 5 DEGREES
CALCULATED T AXIS, ECG11: 9 DEGREES
CHLORIDE SERPL-SCNC: 106 MMOL/L (ref 98–107)
CO2 SERPL-SCNC: 23 MMOL/L (ref 21–32)
CREAT SERPL-MCNC: 1.13 MG/DL (ref 0.6–1)
DIAGNOSIS, 93000: NORMAL
DIFFERENTIAL METHOD BLD: ABNORMAL
EOSINOPHIL # BLD: 0 K/UL (ref 0–0.8)
EOSINOPHIL NFR BLD: 0 % (ref 0.5–7.8)
ERYTHROCYTE [DISTWIDTH] IN BLOOD BY AUTOMATED COUNT: 13.8 % (ref 11.9–14.6)
GLOBULIN SER CALC-MCNC: 3.7 G/DL (ref 2.3–3.5)
GLUCOSE SERPL-MCNC: 165 MG/DL (ref 65–100)
HCT VFR BLD AUTO: 34.3 % (ref 35.8–46.3)
HGB BLD-MCNC: 11.5 G/DL (ref 11.7–15.4)
IMM GRANULOCYTES # BLD: 0 K/UL (ref 0–0.5)
IMM GRANULOCYTES NFR BLD: 0 % (ref 0–5)
LIPASE SERPL-CCNC: 109 U/L (ref 73–393)
LYMPHOCYTES # BLD: 2.7 K/UL (ref 0.5–4.6)
LYMPHOCYTES NFR BLD: 29 % (ref 13–44)
MCH RBC QN AUTO: 28.3 PG (ref 26.1–32.9)
MCHC RBC AUTO-ENTMCNC: 33.5 G/DL (ref 31.4–35)
MCV RBC AUTO: 84.5 FL (ref 79.6–97.8)
MONOCYTES # BLD: 0.5 K/UL (ref 0.1–1.3)
MONOCYTES NFR BLD: 6 % (ref 4–12)
NEUTS SEG # BLD: 5.9 K/UL (ref 1.7–8.2)
NEUTS SEG NFR BLD: 65 % (ref 43–78)
P-R INTERVAL, ECG05: 208 MS
PLATELET # BLD AUTO: 214 K/UL (ref 150–450)
PMV BLD AUTO: 11.2 FL (ref 10.8–14.1)
POTASSIUM SERPL-SCNC: 3.6 MMOL/L (ref 3.5–5.1)
PROT SERPL-MCNC: 7.7 G/DL (ref 6.3–8.2)
Q-T INTERVAL, ECG07: 416 MS
QRS DURATION, ECG06: 94 MS
QTC CALCULATION (BEZET), ECG08: 411 MS
RBC # BLD AUTO: 4.06 M/UL (ref 4.05–5.25)
SODIUM SERPL-SCNC: 141 MMOL/L (ref 136–145)
TROPONIN I BLD-MCNC: 0 NG/ML (ref 0.02–0.05)
VENTRICULAR RATE, ECG03: 59 BPM
WBC # BLD AUTO: 9.1 K/UL (ref 4.3–11.1)

## 2017-10-23 PROCEDURE — 93005 ELECTROCARDIOGRAM TRACING: CPT | Performed by: EMERGENCY MEDICINE

## 2017-10-23 PROCEDURE — 80053 COMPREHEN METABOLIC PANEL: CPT | Performed by: EMERGENCY MEDICINE

## 2017-10-23 PROCEDURE — 70450 CT HEAD/BRAIN W/O DYE: CPT

## 2017-10-23 PROCEDURE — 96361 HYDRATE IV INFUSION ADD-ON: CPT | Performed by: EMERGENCY MEDICINE

## 2017-10-23 PROCEDURE — 96375 TX/PRO/DX INJ NEW DRUG ADDON: CPT | Performed by: EMERGENCY MEDICINE

## 2017-10-23 PROCEDURE — 71010 XR CHEST PORT: CPT

## 2017-10-23 PROCEDURE — 96374 THER/PROPH/DIAG INJ IV PUSH: CPT | Performed by: EMERGENCY MEDICINE

## 2017-10-23 PROCEDURE — 83690 ASSAY OF LIPASE: CPT | Performed by: EMERGENCY MEDICINE

## 2017-10-23 PROCEDURE — 74011250636 HC RX REV CODE- 250/636: Performed by: EMERGENCY MEDICINE

## 2017-10-23 PROCEDURE — 85025 COMPLETE CBC W/AUTO DIFF WBC: CPT | Performed by: EMERGENCY MEDICINE

## 2017-10-23 PROCEDURE — 84484 ASSAY OF TROPONIN QUANT: CPT

## 2017-10-23 PROCEDURE — 99284 EMERGENCY DEPT VISIT MOD MDM: CPT | Performed by: EMERGENCY MEDICINE

## 2017-10-23 RX ORDER — MORPHINE SULFATE 2 MG/ML
4 INJECTION, SOLUTION INTRAMUSCULAR; INTRAVENOUS
Status: COMPLETED | OUTPATIENT
Start: 2017-10-23 | End: 2017-10-23

## 2017-10-23 RX ORDER — ONDANSETRON 2 MG/ML
4 INJECTION INTRAMUSCULAR; INTRAVENOUS
Status: COMPLETED | OUTPATIENT
Start: 2017-10-23 | End: 2017-10-23

## 2017-10-23 RX ORDER — KETOROLAC TROMETHAMINE 30 MG/ML
15 INJECTION, SOLUTION INTRAMUSCULAR; INTRAVENOUS
Status: COMPLETED | OUTPATIENT
Start: 2017-10-23 | End: 2017-10-23

## 2017-10-23 RX ORDER — ONDANSETRON 2 MG/ML
INJECTION INTRAMUSCULAR; INTRAVENOUS
Status: ACTIVE
Start: 2017-10-23 | End: 2017-10-23

## 2017-10-23 RX ORDER — ONDANSETRON 4 MG/1
4 TABLET, ORALLY DISINTEGRATING ORAL
Qty: 20 TAB | Refills: 0 | Status: SHIPPED | OUTPATIENT
Start: 2017-10-23 | End: 2020-05-20

## 2017-10-23 RX ORDER — PANTOPRAZOLE SODIUM 40 MG/1
40 TABLET, DELAYED RELEASE ORAL DAILY
Qty: 20 TAB | Refills: 0 | Status: SHIPPED | OUTPATIENT
Start: 2017-10-23 | End: 2017-11-12

## 2017-10-23 RX ADMIN — SODIUM CHLORIDE 1000 ML: 900 INJECTION, SOLUTION INTRAVENOUS at 01:44

## 2017-10-23 RX ADMIN — ONDANSETRON 4 MG: 2 INJECTION INTRAMUSCULAR; INTRAVENOUS at 01:32

## 2017-10-23 RX ADMIN — KETOROLAC TROMETHAMINE 15 MG: 30 INJECTION, SOLUTION INTRAMUSCULAR at 03:22

## 2017-10-23 RX ADMIN — MORPHINE SULFATE 4 MG: 2 INJECTION, SOLUTION INTRAMUSCULAR; INTRAVENOUS at 01:50

## 2017-10-23 NOTE — ED NOTES
I have reviewed discharge instructions with the patient. The patient verbalized understanding. Patient left ED via Discharge Method: ambulatory to Home with son and daughter. Opportunity for questions and clarification provided. Patient given 2 scripts.

## 2017-10-23 NOTE — ED PROVIDER NOTES
HPI Comments: Patient with history of high blood pressure interval bowel syndrome N p.m. Tonight developed some epigastric pain with nausea and vomiting and bilateral frontal headache. Ate a PBJ sandwich and took 2 aspirin prior to symptoms. No chest pain or shortness of breath. Patient is a 80 y.o. female presenting with vomiting. The history is provided by the patient. No  was used. Vomiting    This is a new problem. The current episode started 1 to 2 hours ago. The problem occurs 2 to 4 times per day. The problem has not changed since onset. The emesis has an appearance of stomach contents. There has been no fever. Associated symptoms include abdominal pain (epigastric), headaches (bilateral frontal. ) and headaches (bilateral frontal. ). Pertinent negatives include no chills, no fever, no diarrhea, no myalgias, no cough and no URI. Her past medical history is significant for irritable bowel syndrome. Past Medical History:   Diagnosis Date    Anxiety     managed with medication     Hematuria, microscopic     Hypercholesteremia     managed with medication     Hypertension     managed with medication     Hypothyroidism     managed with medication     IBS (irritable bowel syndrome)     no recent symptoms    Osteoporosis     managed with supplement    Sciatica     chronic    Short-term memory loss     managed with medication     Sleep apnea     CPAP       Past Surgical History:   Procedure Laterality Date    HX HYSTERECTOMY      tubal     HX SVT ABLATION      HX VITRECTOMY Bilateral          Family History:   Problem Relation Age of Onset    Stroke Father        Social History     Social History    Marital status:      Spouse name: N/A    Number of children: N/A    Years of education: N/A     Occupational History    Not on file. Social History Main Topics    Smoking status: Never Smoker    Smokeless tobacco: Never Used    Alcohol use No    Drug use:  No  Sexual activity: No     Other Topics Concern    Not on file     Social History Narrative         ALLERGIES: Review of patient's allergies indicates no known allergies. Review of Systems   Constitutional: Negative for chills and fever. HENT: Negative for rhinorrhea and sore throat. Eyes: Negative for pain, redness and visual disturbance. Respiratory: Negative for cough, chest tightness, shortness of breath and wheezing. Cardiovascular: Negative for chest pain and leg swelling. Gastrointestinal: Positive for abdominal pain (epigastric), nausea and vomiting. Negative for diarrhea. Genitourinary: Negative for dysuria and hematuria. Musculoskeletal: Negative for back pain, gait problem, myalgias, neck pain and neck stiffness. Skin: Negative for color change and rash. Neurological: Positive for headaches (bilateral frontal. ). Negative for weakness and numbness. Vitals:    10/23/17 0127   BP: 175/77   Pulse: 72   Resp: 18   SpO2: 99%   Weight: 73.9 kg (163 lb)   Height: 5' 4\" (1.626 m)            Physical Exam   Constitutional: She is oriented to person, place, and time. She appears well-developed and well-nourished. HENT:   Head: Normocephalic and atraumatic. Eyes: EOM are normal. Pupils are equal, round, and reactive to light. Neck: Normal range of motion. Neck supple. Cardiovascular: Normal rate and regular rhythm. No murmur heard. Pulmonary/Chest: Effort normal and breath sounds normal. She has no wheezes. Abdominal: Soft. Bowel sounds are normal. There is tenderness (mild epigastric). There is no rebound and no guarding. Musculoskeletal: Normal range of motion. She exhibits no edema. Neurological: She is alert and oriented to person, place, and time. No cranial nerve deficit. She exhibits normal muscle tone. Coordination normal.   Skin: Skin is warm and dry. Nursing note and vitals reviewed.        MDM  Number of Diagnoses or Management Options  Diagnosis management comments: Pt with gastritis. Feeling some better with meds. Will discharge. Amount and/or Complexity of Data Reviewed  Clinical lab tests: ordered and reviewed  Tests in the radiology section of CPT®: ordered and reviewed  Tests in the medicine section of CPT®: ordered and reviewed    Patient Progress  Patient progress: stable    ED Course       Procedures    EKG: normal sinus rhythm, nonspecific ST and T waves changes. Rate 59. Results Include:    Recent Results (from the past 24 hour(s))   CBC WITH AUTOMATED DIFF    Collection Time: 10/23/17  1:31 AM   Result Value Ref Range    WBC 9.1 4.3 - 11.1 K/uL    RBC 4.06 4.05 - 5.25 M/uL    HGB 11.5 (L) 11.7 - 15.4 g/dL    HCT 34.3 (L) 35.8 - 46.3 %    MCV 84.5 79.6 - 97.8 FL    MCH 28.3 26.1 - 32.9 PG    MCHC 33.5 31.4 - 35.0 g/dL    RDW 13.8 11.9 - 14.6 %    PLATELET 099 902 - 754 K/uL    MPV 11.2 10.8 - 14.1 FL    DF AUTOMATED      NEUTROPHILS 65 43 - 78 %    LYMPHOCYTES 29 13 - 44 %    MONOCYTES 6 4.0 - 12.0 %    EOSINOPHILS 0 (L) 0.5 - 7.8 %    BASOPHILS 0 0.0 - 2.0 %    IMMATURE GRANULOCYTES 0 0.0 - 5.0 %    ABS. NEUTROPHILS 5.9 1.7 - 8.2 K/UL    ABS. LYMPHOCYTES 2.7 0.5 - 4.6 K/UL    ABS. MONOCYTES 0.5 0.1 - 1.3 K/UL    ABS. EOSINOPHILS 0.0 0.0 - 0.8 K/UL    ABS. BASOPHILS 0.0 0.0 - 0.2 K/UL    ABS. IMM. GRANS. 0.0 0.0 - 0.5 K/UL   METABOLIC PANEL, COMPREHENSIVE    Collection Time: 10/23/17  1:31 AM   Result Value Ref Range    Sodium 141 136 - 145 mmol/L    Potassium 3.6 3.5 - 5.1 mmol/L    Chloride 106 98 - 107 mmol/L    CO2 23 21 - 32 mmol/L    Anion gap 12 7 - 16 mmol/L    Glucose 165 (H) 65 - 100 mg/dL    BUN 12 8 - 23 MG/DL    Creatinine 1.13 (H) 0.6 - 1.0 MG/DL    GFR est AA 58 (L) >60 ml/min/1.73m2    GFR est non-AA 48 (L) >60 ml/min/1.73m2    Calcium 9.1 8.3 - 10.4 MG/DL    Bilirubin, total 0.6 0.2 - 1.1 MG/DL    ALT (SGPT) 34 12 - 65 U/L    AST (SGOT) 32 15 - 37 U/L    Alk.  phosphatase 70 50 - 136 U/L    Protein, total 7.7 6.3 - 8.2 g/dL    Albumin 4.0 3.2 - 4.6 g/dL    Globulin 3.7 (H) 2.3 - 3.5 g/dL    A-G Ratio 1.1 (L) 1.2 - 3.5     LIPASE    Collection Time: 10/23/17  1:31 AM   Result Value Ref Range    Lipase 109 73 - 393 U/L   POC TROPONIN-I    Collection Time: 10/23/17  1:47 AM   Result Value Ref Range    Troponin-I (POC) 0 (L) 0.02 - 0.05 ng/ml        CT HEAD WO CONT (Final result) Result time: 10/23/17 03:34:34     Final result by Lizbeth Ye MD (10/23/17 03:34:34)     Impression:     IMPRESSION:    Unremarkable brain CT without intravenous contrast.    Date of Dictation: 10/23/2017 3:33 AM       Narrative:     CT HEAD WITHOUT CONTRAST     HISTORY:  Headache. COMPARISON: 8/18/2016    TECHNIQUE: Axial imaging was performed without intravenous contrast utilizing  5mm slice thickness. Sagittal and coronal reformats were performed. FINDINGS:        *BRAIN:      -  There are no early signs of territorial or lacunar infarction by CT.     -  No intracranial mass, hematoma, or hydrocephalus.      -  No gross white matter abnormality by CT.    *VISUALIZED PARANASAL SINUSES: Well aerated. *MASTOIDS:  Clear. *CALVARIUM AND SCALP: Unremarkable.                   XR CHEST PORT (Final result) Result time: 10/23/17 02:09:14     Final result by Lizbeth Ye MD (10/23/17 02:09:14)     Impression:     IMPRESSION: Normal chest.         Narrative:     EXAM:  XR CHEST PORT    INDICATION:  nausea    COMPARISON:  12/13/2008    FINDINGS: A portable AP radiograph of the chest was obtained at 0204 hours.  The  patient is on a cardiac monitor.  The lungs are clear.  The cardiac and  mediastinal contours and pulmonary vascularity are normal.  The bones and soft  tissues are grossly within normal limits.

## 2017-10-23 NOTE — DISCHARGE INSTRUCTIONS
Gastritis: Care Instructions  Your Care Instructions    Gastritis is a sore and upset stomach. It happens when something irritates the stomach lining. Many things can cause it. These include an infection such as the flu or something you ate or drank. Medicines or a sore on the lining of the stomach (ulcer) also can cause it. Your belly may bloat and ache. You may belch, vomit, and feel sick to your stomach. You should be able to relieve the problem by taking medicine. And it may help to change your diet. If gastritis lasts, your doctor may prescribe medicine. Follow-up care is a key part of your treatment and safety. Be sure to make and go to all appointments, and call your doctor if you are having problems. It's also a good idea to know your test results and keep a list of the medicines you take. How can you care for yourself at home? · If your doctor prescribed antibiotics, take them as directed. Do not stop taking them just because you feel better. You need to take the full course of antibiotics. · Be safe with medicines. If your doctor prescribed medicine to decrease stomach acid, take it as directed. Call your doctor if you think you are having a problem with your medicine. · Do not take any other medicine, including over-the-counter pain relievers, without talking to your doctor first.  · If your doctor recommends over-the-counter medicine to reduce stomach acid, such as Pepcid AC, Prilosec, Tagamet HB, or Zantac 75, follow the directions on the label. · Drink plenty of fluids (enough so that your urine is light yellow or clear like water) to prevent dehydration. Choose water and other caffeine-free clear liquids. If you have kidney, heart, or liver disease and have to limit fluids, talk with your doctor before you increase the amount of fluids you drink. · Limit how much alcohol you drink. · Avoid coffee, tea, cola drinks, chocolate, and other foods with caffeine.  They increase stomach acid.  When should you call for help? Call 911 anytime you think you may need emergency care. For example, call if:  · You vomit blood or what looks like coffee grounds. · You pass maroon or very bloody stools. Call your doctor now or seek immediate medical care if:  · You start breathing fast and have not produced urine in the last 8 hours. · You cannot keep fluids down. Watch closely for changes in your health, and be sure to contact your doctor if:  · You do not get better as expected. Where can you learn more? Go to http://karrie-marjan.info/. Enter [id number] in the search box to learn more about \"Gastritis: Care Instructions. \"  Current as of: August 9, 2016  Content Version: 11.3  © 7849-2053 [employer]. Care instructions adapted under license by [employer] (which disclaims liability or warranty for this information). If you have questions about a medical condition or this instruction, always ask your healthcare professional. [address] any warranty or liability for your use of this information.

## 2017-10-23 NOTE — ED TRIAGE NOTES
EMS called to home for nausea vomiting beginning tonight.  Bgl [id number] VSS Dr Elyssa Shipley at [facility]

## 2018-12-31 ENCOUNTER — HOSPITAL ENCOUNTER (EMERGENCY)
Age: 83
Discharge: HOME | End: 2018-12-31
Attending: EMERGENCY MEDICINE
Payer: MEDICARE

## 2018-12-31 ENCOUNTER — APPOINTMENT (OUTPATIENT)
Dept: CT IMAGING | Age: 83
End: 2018-12-31
Attending: EMERGENCY MEDICINE
Payer: MEDICARE

## 2018-12-31 VITALS
OXYGEN SATURATION: 100 % | HEIGHT: 64 IN | RESPIRATION RATE: 16 BRPM | TEMPERATURE: 98.3 F | SYSTOLIC BLOOD PRESSURE: 134 MMHG | BODY MASS INDEX: 27.31 KG/M2 | HEART RATE: 55 BPM | WEIGHT: 160 LBS | DIASTOLIC BLOOD PRESSURE: 71 MMHG

## 2018-12-31 DIAGNOSIS — R53.83 FATIGUE, UNSPECIFIED TYPE: Primary | ICD-10-CM

## 2018-12-31 LAB
ALBUMIN SERPL-MCNC: 3.7 G/DL (ref 3.2–4.6)
ALBUMIN/GLOB SERPL: 0.9 {RATIO}
ALP SERPL-CCNC: 50 U/L (ref 50–136)
ALT SERPL-CCNC: 22 U/L (ref 12–65)
ANION GAP SERPL CALC-SCNC: 8 MMOL/L
AST SERPL-CCNC: 24 U/L (ref 15–37)
BACTERIA URNS QL MICRO: 0 /HPF
BASOPHILS # BLD: 0.1 K/UL (ref 0–0.2)
BASOPHILS NFR BLD: 1 % (ref 0–2)
BILIRUB SERPL-MCNC: 0.5 MG/DL (ref 0.2–1.1)
BUN SERPL-MCNC: 10 MG/DL (ref 8–23)
CALCIUM SERPL-MCNC: 9 MG/DL (ref 8.3–10.4)
CASTS URNS QL MICRO: NORMAL /LPF
CHLORIDE SERPL-SCNC: 99 MMOL/L (ref 98–107)
CO2 SERPL-SCNC: 29 MMOL/L (ref 21–32)
CREAT SERPL-MCNC: 1.62 MG/DL (ref 0.6–1)
DIFFERENTIAL METHOD BLD: ABNORMAL
EOSINOPHIL # BLD: 0.1 K/UL (ref 0–0.8)
EOSINOPHIL NFR BLD: 1 % (ref 0.5–7.8)
EPI CELLS #/AREA URNS HPF: NORMAL /HPF
ERYTHROCYTE [DISTWIDTH] IN BLOOD BY AUTOMATED COUNT: 14 % (ref 11.9–14.6)
GLOBULIN SER CALC-MCNC: 3.9 G/DL (ref 2.3–3.5)
GLUCOSE SERPL-MCNC: 99 MG/DL (ref 65–100)
HCT VFR BLD AUTO: 34 % (ref 35.8–46.3)
HGB BLD-MCNC: 10.9 G/DL (ref 11.7–15.4)
IMM GRANULOCYTES # BLD: 0 K/UL (ref 0–0.5)
IMM GRANULOCYTES NFR BLD AUTO: 0 % (ref 0–5)
LYMPHOCYTES # BLD: 2.1 K/UL (ref 0.5–4.6)
LYMPHOCYTES NFR BLD: 41 % (ref 13–44)
MCH RBC QN AUTO: 27.7 PG (ref 26.1–32.9)
MCHC RBC AUTO-ENTMCNC: 32.1 G/DL (ref 31.4–35)
MCV RBC AUTO: 86.5 FL (ref 79.6–97.8)
MONOCYTES # BLD: 0.5 K/UL (ref 0.1–1.3)
MONOCYTES NFR BLD: 9 % (ref 4–12)
NEUTS SEG # BLD: 2.4 K/UL (ref 1.7–8.2)
NEUTS SEG NFR BLD: 48 % (ref 43–78)
NRBC # BLD: 0 K/UL (ref 0–0.2)
PLATELET # BLD AUTO: 238 K/UL (ref 150–450)
PMV BLD AUTO: 11 FL (ref 9.4–12.3)
POTASSIUM SERPL-SCNC: 2.8 MMOL/L (ref 3.5–5.1)
PROT SERPL-MCNC: 7.6 G/DL
RBC # BLD AUTO: 3.93 M/UL (ref 4.05–5.2)
RBC #/AREA URNS HPF: NORMAL /HPF
SODIUM SERPL-SCNC: 136 MMOL/L (ref 136–145)
TROPONIN I BLD-MCNC: 0 NG/ML (ref 0.02–0.05)
WBC # BLD AUTO: 5 K/UL (ref 4.3–11.1)
WBC URNS QL MICRO: NORMAL /HPF

## 2018-12-31 PROCEDURE — 93005 ELECTROCARDIOGRAM TRACING: CPT | Performed by: EMERGENCY MEDICINE

## 2018-12-31 PROCEDURE — 74011250636 HC RX REV CODE- 250/636: Performed by: EMERGENCY MEDICINE

## 2018-12-31 PROCEDURE — 74011250637 HC RX REV CODE- 250/637: Performed by: EMERGENCY MEDICINE

## 2018-12-31 PROCEDURE — 84484 ASSAY OF TROPONIN QUANT: CPT

## 2018-12-31 PROCEDURE — 70450 CT HEAD/BRAIN W/O DYE: CPT

## 2018-12-31 PROCEDURE — 81003 URINALYSIS AUTO W/O SCOPE: CPT | Performed by: EMERGENCY MEDICINE

## 2018-12-31 PROCEDURE — 85025 COMPLETE CBC W/AUTO DIFF WBC: CPT

## 2018-12-31 PROCEDURE — 96360 HYDRATION IV INFUSION INIT: CPT | Performed by: EMERGENCY MEDICINE

## 2018-12-31 PROCEDURE — 99285 EMERGENCY DEPT VISIT HI MDM: CPT | Performed by: EMERGENCY MEDICINE

## 2018-12-31 PROCEDURE — 80053 COMPREHEN METABOLIC PANEL: CPT

## 2018-12-31 PROCEDURE — 81015 MICROSCOPIC EXAM OF URINE: CPT

## 2018-12-31 RX ORDER — POTASSIUM CHLORIDE 20 MEQ/1
40 TABLET, EXTENDED RELEASE ORAL
Status: COMPLETED | OUTPATIENT
Start: 2018-12-31 | End: 2018-12-31

## 2018-12-31 RX ADMIN — SODIUM CHLORIDE 500 ML: 900 INJECTION, SOLUTION INTRAVENOUS at 21:06

## 2018-12-31 RX ADMIN — POTASSIUM CHLORIDE 40 MEQ: 20 TABLET, EXTENDED RELEASE ORAL at 21:41

## 2019-01-01 NOTE — DISCHARGE INSTRUCTIONS
Please follow up with a primary care provider in 1 to 3 days. Return for any new or concerning symptoms.

## 2019-01-01 NOTE — ED NOTES
I have reviewed discharge instructions with the patient and caregiver. The patient and caregiver verbalized understanding. Patient left ED via Discharge Method: ambulatory to home    Opportunity for questions and clarification provided. Patient given 0 scripts. To continue your aftercare when you leave the hospital, you may receive an automated call from our care team to check in on how you are doing. This is a free service and part of our promise to provide the best care and service to meet your aftercare needs.  If you have questions, or wish to unsubscribe from this service please call [phone]. Thank you for Choosing our [facility] Emergency Department.

## 2019-01-01 NOTE — ED TRIAGE NOTES
Pt complains of \"feeling really bad. \" Reports dizziness and feeling like she's going to \"pass out. \" Reports dizziness while walking and reports decreased appetite.

## 2019-01-01 NOTE — ED PROVIDER NOTES
Patient is an 80-year-old female presenting with intermittent lightheadedness, decreased appetite and decreased sleep. She states symptoms have been ongoing for about a month and a half. She states she spoke to her primary care physician about this she reports that she is \"probably just getting old\". She denies any recent fevers, urinary symptoms, chest pain, vomiting, abdominal pain or leg pain. The history is provided by the patient. No  was used. Dizziness   Pertinent negatives include no shortness of breath, no confusion and no headaches.         Past Medical History:   Diagnosis Date    Anxiety     managed with medication     Hematuria, microscopic     Hypercholesteremia     managed with medication     Hypertension     managed with medication     Hypothyroidism     managed with medication     IBS (irritable bowel syndrome)     no recent symptoms    Osteoporosis     managed with supplement    Sciatica     chronic    Short-term memory loss     managed with medication     Sleep apnea     CPAP       Past Surgical History:   Procedure Laterality Date    HX HYSTERECTOMY      tubal     HX SVT ABLATION      HX VITRECTOMY Bilateral          Family History:   Problem Relation Age of Onset    Stroke Father        Social History     Socioeconomic History    Marital status:      Spouse name: Not on file    Number of children: Not on file    Years of education: Not on file    Highest education level: Not on file   Social Needs    Financial resource strain: Not on file    Food insecurity - worry: Not on file    Food insecurity - inability: Not on file   [employer] needs - medical: Not on file   [employer] needs - non-medical: Not on file   Occupational History    Not on file   Tobacco Use    Smoking status: Never Smoker    Smokeless tobacco: Never Used   Substance and Sexual Activity    Alcohol use: No    Drug use: No    Sexual activity: No   Other Topics Concern    Not on file   Social History Narrative    Not on file         ALLERGIES: Patient has no known allergies. Review of Systems   Constitutional: Positive for appetite change. Negative for fatigue and fever. HENT: Negative for sore throat. Respiratory: Negative for cough, chest tightness and shortness of breath. Cardiovascular: Negative for leg swelling. Gastrointestinal: Negative for abdominal pain. Genitourinary: Negative for dysuria. Musculoskeletal: Negative for back pain. Skin: Negative for rash. Neurological: Positive for dizziness. Negative for syncope and headaches. Psychiatric/Behavioral: Positive for sleep disturbance. Negative for confusion. Vitals:    12/31/18 2005 12/31/18 2007 12/31/18 2010 12/31/18 2015   BP: 143/62 117/61 148/69 166/77   Pulse: (!) 50 (!) 50 (!) 51 (!) 52   Resp:       Temp:       SpO2: 98% 98%  100%   Weight:       Height:                Physical Exam   Constitutional: She is oriented to person, place, and time. She appears well-developed and well-nourished. No distress. HENT:   Head: Normocephalic and atraumatic. Eyes: Conjunctivae and EOM are normal. Pupils are equal, round, and reactive to light. Neck: Normal range of motion. Neck supple. Cardiovascular: Normal rate, regular rhythm and normal heart sounds. Pulmonary/Chest: Effort normal and breath sounds normal. No respiratory distress. She has no wheezes. She has no rales. Abdominal: Soft. She exhibits no distension. There is no tenderness. There is no rebound. Musculoskeletal: Normal range of motion. She exhibits no edema or tenderness. Neurological: She is alert and oriented to person, place, and time. Symmetric smile. Clear speech. Moving all extremities with good strength   Skin: Skin is warm and dry. No rash noted. She is not diaphoretic. Psychiatric: She has a normal mood and affect. Her behavior is normal.   Nursing note and vitals reviewed. MDM  Number of Diagnoses or Management Options  Fatigue, unspecified type: new and does not require workup  Diagnosis management comments: Patient looks quite well given her age. Her lab work is unremarkable. creatinine somewhat elevated. Hydrated in the ED. Albumin within normal.  Urine negative. CT negative for acute findings. Discharged home in stable condition. I see no significant emergent medical issues at this time. Will have patient follow up with PCP in the near future. Ariadna Corona MD; 1/1/2019 @11:57 PM Voice dictation software was used during the making of this note. This software is not perfect and grammatical and other typographical errors may be present.   This note has not been proofread for errors.  ===================================================================         Amount and/or Complexity of Data Reviewed  Clinical lab tests: reviewed and ordered  Tests in the radiology section of CPT®: ordered and reviewed (  )  Review and summarize past medical records: yes  Independent visualization of images, tracings, or specimens: yes    Risk of Complications, Morbidity, and/or Mortality  Presenting problems: moderate  Diagnostic procedures: moderate  Management options: moderate    Patient Progress  Patient progress: stable         Procedures

## 2019-01-02 LAB
ATRIAL RATE: 110 BPM
CALCULATED P AXIS, ECG09: -152 DEGREES
CALCULATED R AXIS, ECG10: 1 DEGREES
CALCULATED T AXIS, ECG11: -7 DEGREES
DIAGNOSIS, 93000: NORMAL
Q-T INTERVAL, ECG07: 452 MS
QRS DURATION, ECG06: 80 MS
QTC CALCULATION (BEZET), ECG08: 408 MS
VENTRICULAR RATE, ECG03: 49 BPM

## 2019-06-18 ENCOUNTER — HOSPITAL ENCOUNTER (EMERGENCY)
Age: 84
Discharge: HOME | End: 2019-06-18
Attending: EMERGENCY MEDICINE
Payer: MEDICARE

## 2019-06-18 VITALS
DIASTOLIC BLOOD PRESSURE: 73 MMHG | TEMPERATURE: 97.8 F | HEIGHT: 64 IN | HEART RATE: 56 BPM | RESPIRATION RATE: 18 BRPM | SYSTOLIC BLOOD PRESSURE: 169 MMHG | OXYGEN SATURATION: 97 % | BODY MASS INDEX: 22.71 KG/M2 | WEIGHT: 133 LBS

## 2019-06-18 DIAGNOSIS — N30.00 ACUTE CYSTITIS WITHOUT HEMATURIA: Primary | ICD-10-CM

## 2019-06-18 DIAGNOSIS — E86.0 DEHYDRATION: ICD-10-CM

## 2019-06-18 LAB
ALBUMIN SERPL-MCNC: 3.8 G/DL (ref 3.2–4.6)
ALBUMIN/GLOB SERPL: 0.9 {RATIO} (ref 1.2–3.5)
ALP SERPL-CCNC: 58 U/L (ref 50–130)
ALT SERPL-CCNC: 21 U/L (ref 12–65)
ANION GAP SERPL CALC-SCNC: 8 MMOL/L (ref 7–16)
APPEARANCE UR: ABNORMAL
AST SERPL-CCNC: 19 U/L (ref 15–37)
BACTERIA URNS QL MICRO: ABNORMAL /HPF
BASOPHILS # BLD: 0 K/UL (ref 0–0.2)
BASOPHILS NFR BLD: 0 % (ref 0–2)
BILIRUB SERPL-MCNC: 0.8 MG/DL (ref 0.2–1.1)
BILIRUB UR QL: ABNORMAL
BUN SERPL-MCNC: 17 MG/DL (ref 8–23)
CALCIUM SERPL-MCNC: 9.5 MG/DL (ref 8.3–10.4)
CHLORIDE SERPL-SCNC: 96 MMOL/L (ref 98–107)
CO2 SERPL-SCNC: 30 MMOL/L (ref 21–32)
COLOR UR: ABNORMAL
CREAT SERPL-MCNC: 1.22 MG/DL (ref 0.6–1)
DIFFERENTIAL METHOD BLD: ABNORMAL
EOSINOPHIL # BLD: 0 K/UL (ref 0–0.8)
EOSINOPHIL NFR BLD: 0 % (ref 0.5–7.8)
EPI CELLS #/AREA URNS HPF: ABNORMAL /HPF
ERYTHROCYTE [DISTWIDTH] IN BLOOD BY AUTOMATED COUNT: 13.5 % (ref 11.9–14.6)
GLOBULIN SER CALC-MCNC: 4.3 G/DL (ref 2.3–3.5)
GLUCOSE SERPL-MCNC: 110 MG/DL (ref 65–100)
GLUCOSE UR STRIP.AUTO-MCNC: NEGATIVE MG/DL
HCT VFR BLD AUTO: 35.9 % (ref 35.8–46.3)
HGB BLD-MCNC: 11.9 G/DL (ref 11.7–15.4)
HGB UR QL STRIP: NEGATIVE
IMM GRANULOCYTES # BLD AUTO: 0 K/UL (ref 0–0.5)
IMM GRANULOCYTES NFR BLD AUTO: 0 % (ref 0–5)
KETONES UR QL STRIP.AUTO: 15 MG/DL
LEUKOCYTE ESTERASE UR QL STRIP.AUTO: ABNORMAL
LYMPHOCYTES # BLD: 0.9 K/UL (ref 0.5–4.6)
LYMPHOCYTES NFR BLD: 17 % (ref 13–44)
MCH RBC QN AUTO: 28 PG (ref 26.1–32.9)
MCHC RBC AUTO-ENTMCNC: 33.1 G/DL (ref 31.4–35)
MCV RBC AUTO: 84.5 FL (ref 79.6–97.8)
MONOCYTES # BLD: 0.4 K/UL (ref 0.1–1.3)
MONOCYTES NFR BLD: 8 % (ref 4–12)
NEUTS SEG # BLD: 4 K/UL (ref 1.7–8.2)
NEUTS SEG NFR BLD: 74 % (ref 43–78)
NITRITE UR QL STRIP.AUTO: NEGATIVE
NRBC # BLD: 0 K/UL (ref 0–0.2)
PH UR STRIP: 6 [PH] (ref 5–9)
PLATELET # BLD AUTO: 284 K/UL (ref 150–450)
PMV BLD AUTO: 10 FL (ref 9.4–12.3)
POTASSIUM SERPL-SCNC: 3.3 MMOL/L (ref 3.5–5.1)
PROT SERPL-MCNC: 8.1 G/DL (ref 6.3–8.2)
PROT UR STRIP-MCNC: ABNORMAL MG/DL
RBC # BLD AUTO: 4.25 M/UL (ref 4.05–5.2)
RBC #/AREA URNS HPF: ABNORMAL /HPF
SODIUM SERPL-SCNC: 134 MMOL/L (ref 136–145)
SP GR UR REFRACTOMETRY: 1.02 (ref 1–1.02)
TSH SERPL DL<=0.005 MIU/L-ACNC: 9.19 UIU/ML
UROBILINOGEN UR QL STRIP.AUTO: 1 EU/DL (ref 0.2–1)
WBC # BLD AUTO: 5.4 K/UL (ref 4.3–11.1)
WBC URNS QL MICRO: ABNORMAL /HPF

## 2019-06-18 PROCEDURE — 74011000258 HC RX REV CODE- 258: Performed by: EMERGENCY MEDICINE

## 2019-06-18 PROCEDURE — 74011250636 HC RX REV CODE- 250/636: Performed by: EMERGENCY MEDICINE

## 2019-06-18 PROCEDURE — 81003 URINALYSIS AUTO W/O SCOPE: CPT | Performed by: EMERGENCY MEDICINE

## 2019-06-18 PROCEDURE — 81001 URINALYSIS AUTO W/SCOPE: CPT

## 2019-06-18 PROCEDURE — 84443 ASSAY THYROID STIM HORMONE: CPT

## 2019-06-18 PROCEDURE — 96365 THER/PROPH/DIAG IV INF INIT: CPT | Performed by: EMERGENCY MEDICINE

## 2019-06-18 PROCEDURE — 87086 URINE CULTURE/COLONY COUNT: CPT

## 2019-06-18 PROCEDURE — 99283 EMERGENCY DEPT VISIT LOW MDM: CPT | Performed by: EMERGENCY MEDICINE

## 2019-06-18 PROCEDURE — 85025 COMPLETE CBC W/AUTO DIFF WBC: CPT

## 2019-06-18 PROCEDURE — 80053 COMPREHEN METABOLIC PANEL: CPT

## 2019-06-18 RX ORDER — SODIUM CHLORIDE 9 MG/ML
500 INJECTION, SOLUTION INTRAVENOUS ONCE
Status: COMPLETED | OUTPATIENT
Start: 2019-06-18 | End: 2019-06-18

## 2019-06-18 RX ORDER — CEFDINIR 300 MG/1
300 CAPSULE ORAL 2 TIMES DAILY
Qty: 20 CAP | Refills: 0 | Status: SHIPPED | OUTPATIENT
Start: 2019-06-18 | End: 2019-06-28

## 2019-06-18 RX ADMIN — SODIUM CHLORIDE 500 ML: 900 INJECTION, SOLUTION INTRAVENOUS at 11:26

## 2019-06-18 RX ADMIN — SODIUM CHLORIDE 500 ML: 900 INJECTION, SOLUTION INTRAVENOUS at 13:09

## 2019-06-18 RX ADMIN — CEFTRIAXONE 1 G: 1 INJECTION, POWDER, FOR SOLUTION INTRAMUSCULAR; INTRAVENOUS at 13:10

## 2019-06-18 NOTE — ED NOTES
I have reviewed discharge instructions with the patient. The patient verbalized understanding. Patient left ED via Discharge Method: ambulatory to Home with daughter. Opportunity for questions and clarification provided. Patient given 1 scripts. To continue your aftercare when you leave the hospital, you may receive an automated call from our care team to check in on how you are doing. This is a free service and part of our promise to provide the best care and service to meet your aftercare needs.  If you have questions, or wish to unsubscribe from this service please call [phone]. Thank you for Choosing our [facility] Emergency Department.

## 2019-06-18 NOTE — ED PROVIDER NOTES
[facility] Emergency Department  Arrival Date/Time: 6/18/2019 10:30 AM      Fred Harrison  MRN: [id number]    YOB: 1929   80 y.o. female    [facility] EMERGENCY DEPT ER01/01  Seen on 6/18/2019 @ 10:49 AM      Today's Chief Complaint:   Chief Complaint   Patient presents with    Dehydration       HPI: 66-year-old female comes to the emergency department from her primary care physician's office secondary to 14 pound weight loss in 3 weeks. Decreased appetite. Nausea. Intermittent vomiting. No alleviating. No aggravating. No chest pain. No shortness of breath. No fever. No chills. HPI    Review of Systems: Review of Systems   Constitutional: Positive for activity change and appetite change. Negative for chills and fever. HENT: Negative. Eyes: Negative. Respiratory: Positive for chest tightness and shortness of breath. Cardiovascular: Negative. Gastrointestinal: Positive for nausea and vomiting. Negative for abdominal distention, abdominal pain, constipation and diarrhea. Endocrine: Positive for cold intolerance. Negative for polydipsia, polyphagia and polyuria. Genitourinary: Negative. Negative for difficulty urinating and dysuria. Musculoskeletal: Negative. Skin: Negative for color change, pallor and rash. Neurological: Negative. Psychiatric/Behavioral: Negative. Past Medical History: Primary Care Doctor: Jonn Bennett MD  Meds, PMH, PSHx, SocHx at end of this note     Allergies: No Known Allergies      Key Anti-Platelet Anticoagulant Meds     The patient is on no antiplatelet meds or anticoagulants. Physical Exam:  Nursing documentation reviewed. Vitals:    06/18/19 1027   BP: 148/76   Pulse: 63   Resp: 18   Temp: 97.8 °F (36.6 °C)   SpO2: 98%    Vital signs were reviewed. Physical Exam   Constitutional: She is oriented to person, place, and time. Elderly nontoxic female   HENT:   Head: Normocephalic and atraumatic. Eyes: Pupils are equal, round, and reactive to light. EOM are normal.   Cardiovascular: Normal rate, regular rhythm and normal heart sounds. No murmur heard. Pulmonary/Chest: Breath sounds normal. No stridor. No respiratory distress. She has no wheezes. Abdominal: Soft. She exhibits no distension. There is no tenderness. There is no guarding. Musculoskeletal: Normal range of motion. Neurological: She is alert and oriented to person, place, and time. Skin: Skin is warm and dry. Capillary refill takes less than 2 seconds. Psychiatric: She has a normal mood and affect. Her behavior is normal.   Nursing note and vitals reviewed. MEDICAL DECISION MAKING:   Differential Diagnosis:    MDM  Number of Diagnoses or Management Options  Diagnosis management comments: 70-year-old female sent from her primary care physician's office secondary to decreased appetite and decreased by mouth intake and weight loss increased fatigue    Patient has no complaints at this time. She's vomited intermittently which her daughter relates to chronic sinusitis and chronic sinus drainage irritating her stomach and making her throw up    But it sounds like it is worse than that recently    We'll check electrolytes. The urine. Thyroid.        Amount and/or Complexity of Data Reviewed  Clinical lab tests: ordered    Risk of Complications, Morbidity, and/or Mortality  Presenting problems: moderate  Diagnostic procedures: minimal  Management options: moderate          Data:      Lab findings during this visit (only abnormal values will be noted, if no value noted then the result   was normal range):   Labs Reviewed   CBC WITH AUTOMATED DIFF - Abnormal; Notable for the following components:       Result Value    EOSINOPHILS 0 (*)     All other components within normal limits   METABOLIC PANEL, COMPREHENSIVE - Abnormal; Notable for the following components:    Sodium 134 (*)     Potassium 3.3 (*)     Chloride 96 (*)     Glucose 110 (*)     Creatinine 1.22 (*)     GFR est AA 53 (*)     GFR est non-AA 44 (*)     Globulin 4.3 (*)     A-G Ratio 0.9 (*)     All other components within normal limits   URINALYSIS W/ RFLX MICROSCOPIC - Abnormal; Notable for the following components:    Protein TRACE (*)     Ketone 15 (*)     Bilirubin SMALL (*)     Leukocyte Esterase MODERATE (*)     All other components within normal limits   CULTURE, URINE   TSH 3RD GENERATION        Radiology studies during this visit:   No orders to display         Medications given in the ED:   Medications   0.9% sodium chloride infusion 500 mL (0 mL IntraVENous IV Completed 6/18/19 1223)   cefTRIAXone (ROCEPHIN) 1 g in 0.9% sodium chloride (MBP/ADV) 50 mL (0 g IntraVENous IV Completed 6/18/19 1342)   0.9% sodium chloride infusion 500 mL (0 mL IntraVENous IV Completed 6/18/19 1342)        Recheck and Additional Documentation (Sepsis, Stroke, Hip):   Patient looks well. Awake alert oriented    Spoke at length with her and her daughter. We'll discharge home. Procedure Documentation: Procedures     Assessment and Plan:      Impression:     ICD-10-CM ICD-9-CM   1. Acute cystitis without hematuria N30.00 595.0   2. Dehydration E86.0 276.51        Disposition:   Home with family    Follow-up:   Follow-up Information     Follow up With Specialties Details Why Contact Info    Martin Archer MD Internal Medicine   [address] [place] [address] Internal Medicine an  [place] [address] [facility]  [address]  [phone]             Discharge Medications:   Discharge Medication List as of 6/18/2019  1:04 PM      CONTINUE these medications which have NOT CHANGED    Details   ondansetron (ZOFRAN ODT) 4 mg disintegrating tablet Take 1 Tab by mouth every eight (8) hours as needed for Nausea. , Print, Disp-20 Tab, R-0      donepezil (ARICEPT) 5 mg tablet Take 5 mg by mouth nightly., Historical Med      ergocalciferol (ERGOCALCIFEROL) 50,000 unit capsule TAKE ONE CAPSULE BY MOUTH EACH WEEK, Historical Med, R-0      lisinopril (PRINIVIL, ZESTRIL) 20 mg tablet 1 TABLET TWICE A DAY (BID) FOR BLOOD PRESSURE ORALLY 90 DAYS, Historical Med, R-0      levothyroxine (SYNTHROID) 50 mcg tablet TAKE 1 TABLET BY MOUTH EVERY MORNING ON AN EMPTY STOMACH, Historical Med, R-0      amLODIPine (NORVASC) 5 mg tablet Take 5 mg by mouth every morning., Historical Med      metoprolol succinate (TOPROL-XL) 50 mg XL tablet 1 TABLET ONCE A DAY FOR BLOOD PRESSURE ORALLY 90 DAYS, Historical Med, R-0      atorvastatin (LIPITOR) 40 mg tablet Take 40 mg by mouth nightly., Historical Med      venlafaxine (EFFEXOR) 37.5 mg tablet Take 37.5 mg by mouth two (2) times a day. Historical Med, 37.5 mg      gabapentin (NEURONTIN) 300 mg capsule Take 300 mg by mouth two (2) times a day. Historical Med, 300 mg              Kasia Brown MD; 06/18/19  10:49 AM      Other ED Course Notes:         Past Medical History:      Past Medical History:   Diagnosis Date    Anxiety     managed with medication     Hematuria, microscopic     Hypercholesteremia     managed with medication     Hypertension     managed with medication     Hypothyroidism     managed with medication     IBS (irritable bowel syndrome)     no recent symptoms    Osteoporosis     managed with supplement    Sciatica     chronic    Short-term memory loss     managed with medication     Sleep apnea     CPAP     Past Surgical History:   Procedure Laterality Date    HX HYSTERECTOMY      tubal     HX SVT ABLATION      HX VITRECTOMY Bilateral      Social History     Tobacco Use    Smoking status: Never Smoker    Smokeless tobacco: Never Used   Substance Use Topics    Alcohol use: No    Drug use: No      Home Medication:   Prior to Admission Medications   Prescriptions Last Dose Informant Patient Reported? Taking? amLODIPine (NORVASC) 5 mg tablet   Yes No   Sig: Take 5 mg by mouth every morning. atorvastatin (LIPITOR) 40 mg tablet   Yes No   Sig: Take 40 mg by mouth nightly. donepezil (ARICEPT) 5 mg tablet   Yes No   Sig: Take 5 mg by mouth nightly.   ergocalciferol (ERGOCALCIFEROL) 50,000 unit capsule   Yes No   Sig: TAKE ONE CAPSULE BY MOUTH EACH WEEK   gabapentin (NEURONTIN) 300 mg capsule   Yes No   Sig: Take 300 mg by mouth two (2) times a day. levothyroxine (SYNTHROID) 50 mcg tablet   Yes No   Sig: TAKE 1 TABLET BY MOUTH EVERY MORNING ON AN EMPTY STOMACH   lisinopril (PRINIVIL, ZESTRIL) 20 mg tablet   Yes No   Si TABLET TWICE A DAY (BID) FOR BLOOD PRESSURE ORALLY 90 DAYS   metoprolol succinate (TOPROL-XL) 50 mg XL tablet   Yes No   Si TABLET ONCE A DAY FOR BLOOD PRESSURE ORALLY 90 DAYS   ondansetron (ZOFRAN ODT) 4 mg disintegrating tablet   No No   Sig: Take 1 Tab by mouth every eight (8) hours as needed for Nausea. venlafaxine (EFFEXOR) 37.5 mg tablet   Yes No   Sig: Take 37.5 mg by mouth two (2) times a day.         Facility-Administered Medications: None

## 2019-06-18 NOTE — ED TRIAGE NOTES
Patient arrives with family complaining of nausea, vomiting, dehydration, weight loss (since last two weeks), and generalized weakness. She is currently being treated for sinusitis, which family reports is not getting any better.

## 2019-06-18 NOTE — DISCHARGE INSTRUCTIONS
Encourage fluids-flavored water sometimes easier to drink then plain water. Taking amoxicillin directed    Follow-up with Dr. Zhanna Shea back if she gets worse or has new problems    Recent Results (from the past 8 hour(s))   CBC WITH AUTOMATED DIFF    Collection Time: 06/18/19 11:18 AM   Result Value Ref Range    WBC 5.4 4.3 - 11.1 K/uL    RBC 4.25 4.05 - 5.2 M/uL    HGB 11.9 11.7 - 15.4 g/dL    HCT 35.9 35.8 - 46.3 %    MCV 84.5 79.6 - 97.8 FL    MCH 28.0 26.1 - 32.9 PG    MCHC 33.1 31.4 - 35.0 g/dL    RDW 13.5 11.9 - 14.6 %    PLATELET 133 119 - 377 K/uL    MPV 10.0 9.4 - 12.3 FL    ABSOLUTE NRBC 0.00 0.0 - 0.2 K/uL    DF AUTOMATED      NEUTROPHILS 74 43 - 78 %    LYMPHOCYTES 17 13 - 44 %    MONOCYTES 8 4.0 - 12.0 %    EOSINOPHILS 0 (L) 0.5 - 7.8 %    BASOPHILS 0 0.0 - 2.0 %    IMMATURE GRANULOCYTES 0 0.0 - 5.0 %    ABS. NEUTROPHILS 4.0 1.7 - 8.2 K/UL    ABS. LYMPHOCYTES 0.9 0.5 - 4.6 K/UL    ABS. MONOCYTES 0.4 0.1 - 1.3 K/UL    ABS. EOSINOPHILS 0.0 0.0 - 0.8 K/UL    ABS. BASOPHILS 0.0 0.0 - 0.2 K/UL    ABS. IMM. GRANS. 0.0 0.0 - 0.5 K/UL   METABOLIC PANEL, COMPREHENSIVE    Collection Time: 06/18/19 11:18 AM   Result Value Ref Range    Sodium 134 (L) 136 - 145 mmol/L    Potassium 3.3 (L) 3.5 - 5.1 mmol/L    Chloride 96 (L) 98 - 107 mmol/L    CO2 30 21 - 32 mmol/L    Anion gap 8 7 - 16 mmol/L    Glucose 110 (H) 65 - 100 mg/dL    BUN 17 8 - 23 MG/DL    Creatinine 1.22 (H) 0.6 - 1.0 MG/DL    GFR est AA 53 (L) >60 ml/min/1.73m2    GFR est non-AA 44 (L) >60 ml/min/1.73m2    Calcium 9.5 8.3 - 10.4 MG/DL    Bilirubin, total 0.8 0.2 - 1.1 MG/DL    ALT (SGPT) 21 12 - 65 U/L    AST (SGOT) 19 15 - 37 U/L    Alk.  phosphatase 58 50 - 130 U/L    Protein, total 8.1 6.3 - 8.2 g/dL    Albumin 3.8 3.2 - 4.6 g/dL    Globulin 4.3 (H) 2.3 - 3.5 g/dL    A-G Ratio 0.9 (L) 1.2 - 3.5     TSH 3RD GENERATION    Collection Time: 06/18/19 11:18 AM   Result Value Ref Range    TSH 9.190 uIU/mL   URINALYSIS W/ RFLX MICROSCOPIC    Collection Time: 06/18/19 12:19 PM   Result Value Ref Range    Color DIANE      Appearance CLOUDY      Specific gravity 1.022 1.001 - 1.023      pH (UA) 6.0 5.0 - 9.0      Protein TRACE (A) NEG mg/dL    Glucose NEGATIVE  mg/dL    Ketone 15 (A) NEG mg/dL    Bilirubin SMALL (A) NEG      Blood NEGATIVE  NEG      Urobilinogen 1.0 0.2 - 1.0 EU/dL    Nitrites NEGATIVE  NEG      Leukocyte Esterase MODERATE (A) NEG      WBC 10-20 0 /hpf    RBC 0-3 0 /hpf    Epithelial cells 5-10 0 /hpf    Bacteria TRACE 0 /hpf

## 2019-06-20 LAB
BACTERIA SPEC CULT: NORMAL
SERVICE CMNT-IMP: NORMAL

## 2020-05-19 ENCOUNTER — APPOINTMENT (OUTPATIENT)
Dept: CT IMAGING | Age: 85
DRG: 482 | End: 2020-05-19
Attending: PHYSICIAN ASSISTANT
Payer: MEDICARE

## 2020-05-19 ENCOUNTER — HOSPITAL ENCOUNTER (EMERGENCY)
Age: 85
Discharge: HOME | DRG: 482 | End: 2020-05-19
Attending: EMERGENCY MEDICINE
Payer: MEDICARE

## 2020-05-19 ENCOUNTER — APPOINTMENT (OUTPATIENT)
Dept: GENERAL RADIOLOGY | Age: 85
DRG: 482 | End: 2020-05-19
Attending: PHYSICIAN ASSISTANT
Payer: MEDICARE

## 2020-05-19 VITALS
TEMPERATURE: 98.2 F | DIASTOLIC BLOOD PRESSURE: 71 MMHG | HEIGHT: 64 IN | HEART RATE: 83 BPM | RESPIRATION RATE: 16 BRPM | BODY MASS INDEX: 25.61 KG/M2 | SYSTOLIC BLOOD PRESSURE: 165 MMHG | WEIGHT: 150 LBS | OXYGEN SATURATION: 98 %
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
## 2020-05-19 DIAGNOSIS — K59.00 CONSTIPATION, UNSPECIFIED CONSTIPATION TYPE: Primary | ICD-10-CM

## 2020-05-19 LAB
ALBUMIN SERPL-MCNC: 3.6 G/DL (ref 3.2–4.6)
ALBUMIN/GLOB SERPL: 0.8 {RATIO} (ref 1.2–3.5)
ALP SERPL-CCNC: 81 U/L (ref 50–130)
ALT SERPL-CCNC: 19 U/L (ref 12–65)
ANION GAP SERPL CALC-SCNC: 10 MMOL/L (ref 7–16)
AST SERPL-CCNC: 20 U/L (ref 15–37)
ATRIAL RATE: 73 BPM
BASOPHILS # BLD: 0 K/UL (ref 0–0.2)
BASOPHILS NFR BLD: 0 % (ref 0–2)
BILIRUB SERPL-MCNC: 0.3 MG/DL (ref 0.2–1.1)
BUN SERPL-MCNC: 24 MG/DL (ref 8–23)
CALCIUM SERPL-MCNC: 9.6 MG/DL (ref 8.3–10.4)
CALCULATED P AXIS, ECG09: 23 DEGREES
CALCULATED R AXIS, ECG10: 3 DEGREES
CALCULATED T AXIS, ECG11: 25 DEGREES
CHLORIDE SERPL-SCNC: 102 MMOL/L (ref 98–107)
CK MB CFR SERPL CALC: ABNORMAL %
CK MB SERPL-MCNC: <1 NG/ML (ref 1.5–3.6)
CK SERPL-CCNC: 78 U/L (ref 21–215)
CO2 SERPL-SCNC: 24 MMOL/L (ref 21–32)
CREAT SERPL-MCNC: 1.42 MG/DL (ref 0.6–1)
DIAGNOSIS, 93000: NORMAL
DIFFERENTIAL METHOD BLD: ABNORMAL
EOSINOPHIL # BLD: 0 K/UL (ref 0–0.8)
EOSINOPHIL NFR BLD: 0 % (ref 0.5–7.8)
ERYTHROCYTE [DISTWIDTH] IN BLOOD BY AUTOMATED COUNT: 15.1 % (ref 11.9–14.6)
GLOBULIN SER CALC-MCNC: 4.7 G/DL (ref 2.3–3.5)
GLUCOSE SERPL-MCNC: 144 MG/DL (ref 65–100)
HCT VFR BLD AUTO: 34.6 % (ref 35.8–46.3)
HGB BLD-MCNC: 10.8 G/DL (ref 11.7–15.4)
IMM GRANULOCYTES # BLD AUTO: 0 K/UL (ref 0–0.5)
IMM GRANULOCYTES NFR BLD AUTO: 0 % (ref 0–5)
LIPASE SERPL-CCNC: 75 U/L (ref 73–393)
LYMPHOCYTES # BLD: 0.6 K/UL (ref 0.5–4.6)
LYMPHOCYTES NFR BLD: 9 % (ref 13–44)
MCH RBC QN AUTO: 27.1 PG (ref 26.1–32.9)
MCHC RBC AUTO-ENTMCNC: 31.2 G/DL (ref 31.4–35)
MCV RBC AUTO: 86.7 FL (ref 79.6–97.8)
MONOCYTES # BLD: 0.3 K/UL (ref 0.1–1.3)
MONOCYTES NFR BLD: 4 % (ref 4–12)
NEUTS SEG # BLD: 5.9 K/UL (ref 1.7–8.2)
NEUTS SEG NFR BLD: 86 % (ref 43–78)
NRBC # BLD: 0 K/UL (ref 0–0.2)
P-R INTERVAL, ECG05: 228 MS
PLATELET # BLD AUTO: 239 K/UL (ref 150–450)
PMV BLD AUTO: 10.9 FL (ref 9.4–12.3)
POTASSIUM SERPL-SCNC: 4.8 MMOL/L (ref 3.5–5.1)
PROT SERPL-MCNC: 8.3 G/DL (ref 6.3–8.2)
Q-T INTERVAL, ECG07: 422 MS
QRS DURATION, ECG06: 80 MS
QTC CALCULATION (BEZET), ECG08: 464 MS
RBC # BLD AUTO: 3.99 M/UL (ref 4.05–5.2)
SODIUM SERPL-SCNC: 136 MMOL/L (ref 136–145)
TROPONIN I SERPL-MCNC: <0.02 NG/ML (ref 0.02–0.05)
VENTRICULAR RATE, ECG03: 73 BPM
WBC # BLD AUTO: 6.9 K/UL (ref 4.3–11.1)

## 2020-05-19 PROCEDURE — 82550 ASSAY OF CK (CPK): CPT

## 2020-05-19 PROCEDURE — 81003 URINALYSIS AUTO W/O SCOPE: CPT

## 2020-05-19 PROCEDURE — 74011250636 HC RX REV CODE- 250/636: Performed by: PHYSICIAN ASSISTANT

## 2020-05-19 PROCEDURE — 70450 CT HEAD/BRAIN W/O DYE: CPT

## 2020-05-19 PROCEDURE — 80053 COMPREHEN METABOLIC PANEL: CPT

## 2020-05-19 PROCEDURE — 74022 RADEX COMPL AQT ABD SERIES: CPT

## 2020-05-19 PROCEDURE — 84484 ASSAY OF TROPONIN QUANT: CPT

## 2020-05-19 PROCEDURE — 74011250637 HC RX REV CODE- 250/637: Performed by: PHYSICIAN ASSISTANT

## 2020-05-19 PROCEDURE — 83690 ASSAY OF LIPASE: CPT

## 2020-05-19 PROCEDURE — 51701 INSERT BLADDER CATHETER: CPT

## 2020-05-19 PROCEDURE — 85025 COMPLETE CBC W/AUTO DIFF WBC: CPT

## 2020-05-19 PROCEDURE — 99285 EMERGENCY DEPT VISIT HI MDM: CPT

## 2020-05-19 PROCEDURE — 93005 ELECTROCARDIOGRAM TRACING: CPT | Performed by: PHYSICIAN ASSISTANT

## 2020-05-19 RX ORDER — SODIUM CHLORIDE 0.9 % (FLUSH) 0.9 %
5-40 SYRINGE (ML) INJECTION AS NEEDED
Status: DISCONTINUED | OUTPATIENT
Start: 2020-05-19 | End: 2020-05-19 | Stop reason: HOSPADM

## 2020-05-19 RX ORDER — POTASSIUM CHLORIDE 1500 MG/1
TABLET, FILM COATED, EXTENDED RELEASE ORAL
COMMUNITY

## 2020-05-19 RX ORDER — BUSPIRONE HYDROCHLORIDE 5 MG/1
5 TABLET ORAL
Status: ON HOLD | COMMUNITY
End: 2020-05-28 | Stop reason: SDUPTHER

## 2020-05-19 RX ORDER — CYPROHEPTADINE HYDROCHLORIDE 4 MG/1
TABLET ORAL DAILY
COMMUNITY

## 2020-05-19 RX ORDER — SODIUM CHLORIDE 9 MG/ML
500 INJECTION, SOLUTION INTRAVENOUS ONCE
Status: COMPLETED | OUTPATIENT
Start: 2020-05-19 | End: 2020-05-19

## 2020-05-19 RX ORDER — MAGNESIUM CITRATE
296 SOLUTION, ORAL ORAL
Status: COMPLETED | OUTPATIENT
Start: 2020-05-19 | End: 2020-05-19

## 2020-05-19 RX ORDER — FLUOXETINE HYDROCHLORIDE 40 MG/1
CAPSULE ORAL DAILY
Status: ON HOLD | COMMUNITY
End: 2020-05-28 | Stop reason: SDUPTHER

## 2020-05-19 RX ADMIN — MAGNESIUM CITRATE 296 ML: 1.75 LIQUID ORAL at 17:59

## 2020-05-19 RX ADMIN — SODIUM CHLORIDE 500 ML: 900 INJECTION, SOLUTION INTRAVENOUS at 16:31

## 2020-05-19 NOTE — ED TRIAGE NOTES
Per brought to ED via EMS. Per EMS pt was attempting to get up last night and slid down and was found sitting beside the bed this afternoon. Pt reports sacral pain after fall. Per EMS bgl 164.     Lilia Canela RN

## 2020-05-19 NOTE — ED PROVIDER NOTES
Here via EMS status post fall. Initially it was thought patient had been on the floor since 4 AM but patient does have some dementia. Patient lives with her daughter who states she brought the patient breakfast at about 9 AM and she was in bed but patient did not want to eat only drink Ensure. Daughter states patient usually goes back to sleep and therefore she did not check on her until about 2 in the afternoon and she was found on the floor in a sitting position and had vomited. EMS was then called. Daughter states she feels her speech may be more slurred than normal but patient does answer questions appropriately is very sleepy and again daughter states Nancy Thrasher sleeps all the time\". Patient also has problems with constipation apparently waxes and wanes and the patient did states she was constipated    The history is provided by the patient. Fall   The accident occurred 3 to 5 hours ago. The fall occurred from a bed. She fell from a height of 1 - 2 ft. She landed on hard floor. There was no blood loss. Point of impact: buttocks area. She was not ambulatory at the scene. There was no entrapment after the fall. There was no alcohol use involved in the accident. Pertinent negatives include no laceration. The risk factors include dementia and being elderly. She has tried nothing for the symptoms. The treatment provided no relief.         Past Medical History:   Diagnosis Date    Anxiety     managed with medication     Hematuria, microscopic     Hypercholesteremia     managed with medication     Hypertension     managed with medication     Hypothyroidism     managed with medication     IBS (irritable bowel syndrome)     no recent symptoms    Osteoporosis     managed with supplement    Sciatica     chronic    Short-term memory loss     managed with medication     Sleep apnea     CPAP       Past Surgical History:   Procedure Laterality Date    HX HYSTERECTOMY      tubal     HX SVT ABLATION      HX VITRECTOMY Bilateral          Family History:   Problem Relation Age of Onset    Stroke Father        Social History     Socioeconomic History    Marital status:      Spouse name: Not on file    Number of children: Not on file    Years of education: Not on file    Highest education level: Not on file   Occupational History    Not on file   Social Needs    Financial resource strain: Not on file    Food insecurity     Worry: Not on file     Inability: Not on file    Transportation needs     Medical: Not on file     Non-medical: Not on file   Tobacco Use    Smoking status: Never Smoker    Smokeless tobacco: Never Used   Substance and Sexual Activity    Alcohol use: No    Drug use: No    Sexual activity: Never   Lifestyle    Physical activity     Days per week: Not on file     Minutes per session: Not on file    Stress: Not on file   Relationships    Social connections     Talks on phone: Not on file     Gets together: Not on file     Attends [employer] service: Not on file     Active member of club or organization: Not on file     Attends meetings of clubs or organizations: Not on file     Relationship status: Not on file    Intimate partner violence     Fear of current or ex partner: Not on file     Emotionally abused: Not on file     Physically abused: Not on file     Forced sexual activity: Not on file   Other Topics Concern    Not on file   Social History Narrative    Not on file         ALLERGIES: Patient has no known allergies. Review of Systems   All other systems reviewed and are negative. Vitals:    05/19/20 1457   BP: 184/77   Pulse: 75   Resp: 16   Temp: 98.2 °F (36.8 °C)   SpO2: 100%   Weight: 68 kg (150 lb)   Height: 5' 4\" (1.626 m)            Physical Exam  Vitals signs and nursing note reviewed. Constitutional:       General: She is not in acute distress. Appearance: She is well-developed and normal weight. She is not diaphoretic.    HENT:      Head: Normocephalic and atraumatic. Right Ear: External ear normal.      Left Ear: External ear normal.      Nose: Nose normal.      Mouth/Throat:      Mouth: Mucous membranes are moist.   Eyes:      Pupils: Pupils are equal, round, and reactive to light. Neck:      Musculoskeletal: Normal range of motion and neck supple. Cardiovascular:      Rate and Rhythm: Normal rate and regular rhythm. Pulmonary:      Effort: Pulmonary effort is normal.      Breath sounds: Normal breath sounds. No wheezing or rhonchi. Abdominal:      General: Abdomen is flat. Bowel sounds are normal.      Palpations: Abdomen is soft. Tenderness: There is no abdominal tenderness. Hernia: No hernia is present. Musculoskeletal: Normal range of motion. General: No swelling or tenderness. Skin:     General: Skin is warm. Coloration: Skin is not jaundiced. Findings: No bruising, erythema, laceration, lesion or rash. Neurological:      Mental Status: She is alert and oriented to person, place, and time. Mental status is at baseline.       Comments: Per daughter speech is slightly  more slurred than her normal, follows commands but slow           MDM  Number of Diagnoses or Management Options  Diagnosis management comments: Cbc normal cmp with bun 24, cr 1.4, trop normal, ck mb normal  Urine dip -  Head ct negative  abd series with moderate stool   Pt feeling better post iv fluids, daughter willing to try to take her home, push fluids, use daily Miralax   See pmd or return to er if symptoms worsen pt discussed with Dr. Kareem Mar and/or Complexity of Data Reviewed  Clinical lab tests: ordered and reviewed  Tests in the radiology section of CPT®: ordered and reviewed  Review and summarize past medical records: yes  Discuss the patient with other providers: yes    Risk of Complications, Morbidity, and/or Mortality  Presenting problems: moderate  Diagnostic procedures: moderate  Management options: moderate    Patient Progress  Patient progress: improved         Procedures

## 2020-05-19 NOTE — ED NOTES
I have reviewed discharge instructions with the patient and daughter. The patient and daughter verbalized understanding. Patient left ED via Discharge Method: wheelchair to Home with self and daughter. Opportunity for questions and clarification provided. Patient given 0 scripts. To continue your aftercare when you leave the hospital, you may receive an automated call from our care team to check in on how you are doing. This is a free service and part of our promise to provide the best care and service to meet your aftercare needs.  If you have questions, or wish to unsubscribe from this service please call [phone]. Thank you for Choosing our [facility] Emergency Department.

## 2020-05-19 NOTE — DISCHARGE INSTRUCTIONS
Use daily MiraLAX and push fluids, get alarm to help with patient getting up on her own, return to ER if symptoms worsen

## 2020-05-20 ENCOUNTER — HOSPITAL ENCOUNTER (EMERGENCY)
Age: 85
Discharge: ANOTHER HOSPITAL | DRG: 482 | End: 2020-05-20
Attending: EMERGENCY MEDICINE
Payer: MEDICARE

## 2020-05-20 ENCOUNTER — HOSPITAL ENCOUNTER (INPATIENT)
Age: 85
LOS: 8 days | Discharge: NURSING, REHAB OR LONG-TERM CARE | DRG: 482 | End: 2020-05-28
Attending: INTERNAL MEDICINE | Admitting: FAMILY MEDICINE
Payer: MEDICARE

## 2020-05-20 ENCOUNTER — APPOINTMENT (OUTPATIENT)
Dept: GENERAL RADIOLOGY | Age: 85
DRG: 482 | End: 2020-05-20
Attending: EMERGENCY MEDICINE
Payer: MEDICARE

## 2020-05-20 VITALS
RESPIRATION RATE: 16 BRPM | SYSTOLIC BLOOD PRESSURE: 145 MMHG | TEMPERATURE: 98.4 F | HEART RATE: 76 BPM | BODY MASS INDEX: 25.61 KG/M2 | OXYGEN SATURATION: 91 % | DIASTOLIC BLOOD PRESSURE: 66 MMHG | WEIGHT: 150 LBS | HEIGHT: 64 IN

## 2020-05-20 DIAGNOSIS — S72.002A CLOSED FRACTURE OF LEFT HIP, INITIAL ENCOUNTER (HCC): Primary | ICD-10-CM

## 2020-05-20 DIAGNOSIS — K59.00 CONSTIPATION, UNSPECIFIED CONSTIPATION TYPE: ICD-10-CM

## 2020-05-20 PROBLEM — N18.30 CKD (CHRONIC KIDNEY DISEASE) STAGE 3, GFR 30-59 ML/MIN (HCC): Status: ACTIVE | Noted: 2020-05-20

## 2020-05-20 PROBLEM — I10 ESSENTIAL HYPERTENSION: Status: ACTIVE | Noted: 2020-05-20

## 2020-05-20 PROBLEM — F03.90 DEMENTIA (HCC): Status: ACTIVE | Noted: 2020-05-20

## 2020-05-20 PROBLEM — F32.A DEPRESSION: Status: ACTIVE | Noted: 2020-05-20

## 2020-05-20 PROBLEM — E03.9 ACQUIRED HYPOTHYROIDISM: Status: ACTIVE | Noted: 2020-05-20

## 2020-05-20 LAB
ANION GAP SERPL CALC-SCNC: 9 MMOL/L (ref 7–16)
BASOPHILS # BLD: 0.1 K/UL (ref 0–0.2)
BASOPHILS NFR BLD: 0 % (ref 0–2)
BUN SERPL-MCNC: 21 MG/DL (ref 8–23)
CALCIUM SERPL-MCNC: 8.6 MG/DL (ref 8.3–10.4)
CHLORIDE SERPL-SCNC: 104 MMOL/L (ref 98–107)
CO2 SERPL-SCNC: 25 MMOL/L (ref 21–32)
CREAT SERPL-MCNC: 1.28 MG/DL (ref 0.6–1)
DIFFERENTIAL METHOD BLD: ABNORMAL
EOSINOPHIL # BLD: 0.1 K/UL (ref 0–0.8)
EOSINOPHIL NFR BLD: 1 % (ref 0.5–7.8)
ERYTHROCYTE [DISTWIDTH] IN BLOOD BY AUTOMATED COUNT: 15.2 % (ref 11.9–14.6)
GLUCOSE SERPL-MCNC: 94 MG/DL (ref 65–100)
HCT VFR BLD AUTO: 31.3 % (ref 35.8–46.3)
HGB BLD-MCNC: 9.9 G/DL (ref 11.7–15.4)
IMM GRANULOCYTES # BLD AUTO: 0.1 K/UL (ref 0–0.5)
IMM GRANULOCYTES NFR BLD AUTO: 0 % (ref 0–5)
INR PPP: 1
LYMPHOCYTES # BLD: 1.5 K/UL (ref 0.5–4.6)
LYMPHOCYTES NFR BLD: 13 % (ref 13–44)
MCH RBC QN AUTO: 27.2 PG (ref 26.1–32.9)
MCHC RBC AUTO-ENTMCNC: 31.6 G/DL (ref 31.4–35)
MCV RBC AUTO: 86 FL (ref 79.6–97.8)
MONOCYTES # BLD: 0.9 K/UL (ref 0.1–1.3)
MONOCYTES NFR BLD: 8 % (ref 4–12)
NEUTS SEG # BLD: 8.9 K/UL (ref 1.7–8.2)
NEUTS SEG NFR BLD: 77 % (ref 43–78)
NRBC # BLD: 0.02 K/UL (ref 0–0.2)
PLATELET # BLD AUTO: 220 K/UL (ref 150–450)
PMV BLD AUTO: 11.4 FL (ref 9.4–12.3)
POTASSIUM SERPL-SCNC: 3.7 MMOL/L (ref 3.5–5.1)
PROTHROMBIN TIME: 13.2 SEC (ref 12–14.7)
RBC # BLD AUTO: 3.64 M/UL (ref 4.05–5.2)
SODIUM SERPL-SCNC: 138 MMOL/L (ref 136–145)
TROPONIN I SERPL-MCNC: <0.02 NG/ML (ref 0.02–0.05)
WBC # BLD AUTO: 11.5 K/UL (ref 4.3–11.1)

## 2020-05-20 PROCEDURE — 74011250636 HC RX REV CODE- 250/636: Performed by: FAMILY MEDICINE

## 2020-05-20 PROCEDURE — 96375 TX/PRO/DX INJ NEW DRUG ADDON: CPT

## 2020-05-20 PROCEDURE — 99284 EMERGENCY DEPT VISIT MOD MDM: CPT

## 2020-05-20 PROCEDURE — 74011250637 HC RX REV CODE- 250/637: Performed by: FAMILY MEDICINE

## 2020-05-20 PROCEDURE — 74011250636 HC RX REV CODE- 250/636: Performed by: EMERGENCY MEDICINE

## 2020-05-20 PROCEDURE — 65270000029 HC RM PRIVATE

## 2020-05-20 PROCEDURE — 71045 X-RAY EXAM CHEST 1 VIEW: CPT

## 2020-05-20 PROCEDURE — 85025 COMPLETE CBC W/AUTO DIFF WBC: CPT

## 2020-05-20 PROCEDURE — 80048 BASIC METABOLIC PNL TOTAL CA: CPT

## 2020-05-20 PROCEDURE — 93005 ELECTROCARDIOGRAM TRACING: CPT | Performed by: EMERGENCY MEDICINE

## 2020-05-20 PROCEDURE — 74011000302 HC RX REV CODE- 302: Performed by: FAMILY MEDICINE

## 2020-05-20 PROCEDURE — 84484 ASSAY OF TROPONIN QUANT: CPT

## 2020-05-20 PROCEDURE — 85610 PROTHROMBIN TIME: CPT

## 2020-05-20 PROCEDURE — 96374 THER/PROPH/DIAG INJ IV PUSH: CPT

## 2020-05-20 PROCEDURE — 77030040361 HC SLV COMPR DVT MDII -B

## 2020-05-20 PROCEDURE — 73502 X-RAY EXAM HIP UNI 2-3 VIEWS: CPT

## 2020-05-20 PROCEDURE — 86580 TB INTRADERMAL TEST: CPT | Performed by: FAMILY MEDICINE

## 2020-05-20 RX ORDER — ONDANSETRON 2 MG/ML
4 INJECTION INTRAMUSCULAR; INTRAVENOUS
Status: DISCONTINUED | OUTPATIENT
Start: 2020-05-20 | End: 2020-05-21 | Stop reason: SDUPTHER

## 2020-05-20 RX ORDER — SODIUM CHLORIDE 0.9 % (FLUSH) 0.9 %
5-40 SYRINGE (ML) INJECTION AS NEEDED
Status: DISCONTINUED | OUTPATIENT
Start: 2020-05-20 | End: 2020-05-28 | Stop reason: HOSPADM

## 2020-05-20 RX ORDER — HYDROMORPHONE HYDROCHLORIDE 1 MG/ML
1 INJECTION, SOLUTION INTRAMUSCULAR; INTRAVENOUS; SUBCUTANEOUS
Status: COMPLETED | OUTPATIENT
Start: 2020-05-20 | End: 2020-05-20

## 2020-05-20 RX ORDER — DIPHENHYDRAMINE HYDROCHLORIDE 50 MG/ML
12.5 INJECTION, SOLUTION INTRAMUSCULAR; INTRAVENOUS
Status: DISCONTINUED | OUTPATIENT
Start: 2020-05-20 | End: 2020-05-28 | Stop reason: HOSPADM

## 2020-05-20 RX ORDER — SODIUM CHLORIDE 0.9 % (FLUSH) 0.9 %
5-40 SYRINGE (ML) INJECTION EVERY 8 HOURS
Status: DISCONTINUED | OUTPATIENT
Start: 2020-05-20 | End: 2020-05-22

## 2020-05-20 RX ORDER — POTASSIUM CHLORIDE 20 MEQ/1
20 TABLET, EXTENDED RELEASE ORAL DAILY
Status: DISCONTINUED | OUTPATIENT
Start: 2020-05-21 | End: 2020-05-28 | Stop reason: HOSPADM

## 2020-05-20 RX ORDER — BUSPIRONE HYDROCHLORIDE 5 MG/1
5 TABLET ORAL DAILY
Status: DISCONTINUED | OUTPATIENT
Start: 2020-05-21 | End: 2020-05-28 | Stop reason: HOSPADM

## 2020-05-20 RX ORDER — NALOXONE HYDROCHLORIDE 0.4 MG/ML
0.4 INJECTION, SOLUTION INTRAMUSCULAR; INTRAVENOUS; SUBCUTANEOUS AS NEEDED
Status: DISCONTINUED | OUTPATIENT
Start: 2020-05-20 | End: 2020-05-28 | Stop reason: HOSPADM

## 2020-05-20 RX ORDER — SODIUM CHLORIDE 9 MG/ML
100 INJECTION, SOLUTION INTRAVENOUS CONTINUOUS
Status: DISCONTINUED | OUTPATIENT
Start: 2020-05-20 | End: 2020-05-22

## 2020-05-20 RX ORDER — HYDROCODONE BITARTRATE AND ACETAMINOPHEN 5; 325 MG/1; MG/1
1 TABLET ORAL
Status: DISCONTINUED | OUTPATIENT
Start: 2020-05-20 | End: 2020-05-21

## 2020-05-20 RX ORDER — ADHESIVE BANDAGE
30 BANDAGE TOPICAL DAILY PRN
Status: DISCONTINUED | OUTPATIENT
Start: 2020-05-20 | End: 2020-05-28 | Stop reason: HOSPADM

## 2020-05-20 RX ORDER — FLUOXETINE HYDROCHLORIDE 20 MG/1
40 CAPSULE ORAL DAILY
Status: DISCONTINUED | OUTPATIENT
Start: 2020-05-21 | End: 2020-05-28 | Stop reason: HOSPADM

## 2020-05-20 RX ORDER — ONDANSETRON 2 MG/ML
4 INJECTION INTRAMUSCULAR; INTRAVENOUS
Status: COMPLETED | OUTPATIENT
Start: 2020-05-20 | End: 2020-05-20

## 2020-05-20 RX ORDER — METOPROLOL SUCCINATE 50 MG/1
50 TABLET, EXTENDED RELEASE ORAL DAILY
Status: DISCONTINUED | OUTPATIENT
Start: 2020-05-21 | End: 2020-05-28 | Stop reason: HOSPADM

## 2020-05-20 RX ORDER — MAGNESIUM CITRATE
296 SOLUTION, ORAL ORAL
Status: COMPLETED | OUTPATIENT
Start: 2020-05-20 | End: 2020-05-20

## 2020-05-20 RX ORDER — CYPROHEPTADINE HYDROCHLORIDE 4 MG/1
4 TABLET ORAL DAILY
Status: DISCONTINUED | OUTPATIENT
Start: 2020-05-21 | End: 2020-05-28 | Stop reason: HOSPADM

## 2020-05-20 RX ORDER — ACETAMINOPHEN 325 MG/1
650 TABLET ORAL
Status: DISCONTINUED | OUTPATIENT
Start: 2020-05-20 | End: 2020-05-28 | Stop reason: HOSPADM

## 2020-05-20 RX ORDER — AMLODIPINE BESYLATE 5 MG/1
5 TABLET ORAL
Status: DISCONTINUED | OUTPATIENT
Start: 2020-05-21 | End: 2020-05-28 | Stop reason: HOSPADM

## 2020-05-20 RX ORDER — MORPHINE SULFATE 2 MG/ML
1 INJECTION, SOLUTION INTRAMUSCULAR; INTRAVENOUS
Status: DISCONTINUED | OUTPATIENT
Start: 2020-05-20 | End: 2020-05-21

## 2020-05-20 RX ORDER — LEVOTHYROXINE SODIUM 50 UG/1
50 TABLET ORAL
Status: DISCONTINUED | OUTPATIENT
Start: 2020-05-21 | End: 2020-05-28 | Stop reason: HOSPADM

## 2020-05-20 RX ADMIN — SODIUM CHLORIDE 100 ML/HR: 900 INJECTION, SOLUTION INTRAVENOUS at 19:57

## 2020-05-20 RX ADMIN — HYDROMORPHONE HYDROCHLORIDE 1 MG: 1 INJECTION, SOLUTION INTRAMUSCULAR; INTRAVENOUS; SUBCUTANEOUS at 17:17

## 2020-05-20 RX ADMIN — MAGNESIUM CITRATE 296 ML: 1.75 LIQUID ORAL at 20:51

## 2020-05-20 RX ADMIN — TUBERCULIN PURIFIED PROTEIN DERIVATIVE 5 UNITS: 5 INJECTION, SOLUTION INTRADERMAL at 19:57

## 2020-05-20 RX ADMIN — ONDANSETRON 4 MG: 2 INJECTION INTRAMUSCULAR; INTRAVENOUS at 17:16

## 2020-05-20 NOTE — ED NOTES
TRANSFER - OUT REPORT:    Verbal report given to [facility] MAJO RN(name) on [employer]  being transferred to [facility](unit) for routine progression of care       Report consisted of patients Situation, Background, Assessment and   Recommendations(SBAR). Information from the following report(s) ED Summary was reviewed with the receiving nurse. Lines:   Peripheral IV 05/20/20 Left Forearm (Active)   Site Assessment Clean, dry, & intact 5/20/2020  5:15 PM   Phlebitis Assessment 0 5/20/2020  5:15 PM   Infiltration Assessment 0 5/20/2020  5:15 PM   Dressing Status Clean, dry, & intact 5/20/2020  5:15 PM   Dressing Type Transparent 5/20/2020  5:15 PM   Hub Color/Line Status Blue 5/20/2020  5:15 PM   Action Taken Blood drawn 5/20/2020  5:15 PM        Opportunity for questions and clarification was provided.       Patient transported with:   Leon Bliss

## 2020-05-20 NOTE — ED PROVIDER NOTES
61-year-old female presents with left hip pain following a second fall from bed. Patient was seen here yesterday afternoon/evening after rolling out of bed towards her left side she had left hip pain. Patient underwent x-rays which reportedly did not demonstrate a fracture, daughter says she had a CT scan as well. Of note some of the films showed \"impaction\" and they were provided \"liquid medicine\" however the daughters bad back and patient's inability to walk they did not undertake that yet. After leaving the ER yesterday evening patient remains nonambulatory,(whereas she was fully ambulatory prior to the fall) last night patient had a second fall from bed, again rolling out of bed towards the left side complains of increased left hip pain and needless to say remains nonambulatory.            Past Medical History:   Diagnosis Date    Anxiety     managed with medication     Hematuria, microscopic     Hypercholesteremia     managed with medication     Hypertension     managed with medication     Hypothyroidism     managed with medication     IBS (irritable bowel syndrome)     no recent symptoms    Osteoporosis     managed with supplement    Sciatica     chronic    Short-term memory loss     managed with medication     Sleep apnea     CPAP       Past Surgical History:   Procedure Laterality Date    HX HYSTERECTOMY      tubal     HX SVT ABLATION      HX VITRECTOMY Bilateral          Family History:   Problem Relation Age of Onset    Stroke Father        Social History     Socioeconomic History    Marital status:      Spouse name: Not on file    Number of children: Not on file    Years of education: Not on file    Highest education level: Not on file   Occupational History    Not on file   Social Needs    Financial resource strain: Not on file    Food insecurity     Worry: Not on file     Inability: Not on file    Transportation needs     Medical: Not on file     Non-medical: Not on file   Tobacco Use    Smoking status: Never Smoker    Smokeless tobacco: Never Used   Substance and Sexual Activity    Alcohol use: No    Drug use: No    Sexual activity: Never   Lifestyle    Physical activity     Days per week: Not on file     Minutes per session: Not on file    Stress: Not on file   Relationships    Social connections     Talks on phone: Not on file     Gets together: Not on file     Attends Holiness service: Not on file     Active member of club or organization: Not on file     Attends meetings of clubs or organizations: Not on file     Relationship status: Not on file    Intimate partner violence     Fear of current or ex partner: Not on file     Emotionally abused: Not on file     Physically abused: Not on file     Forced sexual activity: Not on file   Other Topics Concern    Not on file   Social History Narrative    Not on file         ALLERGIES: Patient has no known allergies. Review of Systems   Constitutional: Negative for chills and fever. HENT: Negative for rhinorrhea and sore throat. Eyes: Negative for discharge and redness. Respiratory: Negative for cough and shortness of breath. Cardiovascular: Negative for chest pain and palpitations. Gastrointestinal: Positive for constipation, nausea and vomiting. Negative for abdominal pain and diarrhea. Musculoskeletal: Positive for arthralgias and gait problem. Negative for back pain. Skin: Negative for rash. Neurological: Negative for dizziness and headaches. All other systems reviewed and are negative. There were no vitals filed for this visit. Physical Exam  Vitals signs and nursing note reviewed. Constitutional:       General: She is not in acute distress. Appearance: Normal appearance. She is well-developed. She is not ill-appearing, toxic-appearing or diaphoretic. HENT:      Head: Normocephalic and atraumatic. Eyes:      General: No scleral icterus. Right eye: No discharge. Left eye: No discharge. Conjunctiva/sclera: Conjunctivae normal.      Pupils: Pupils are equal, round, and reactive to light. Neck:      Musculoskeletal: Normal range of motion and neck supple. Cardiovascular:      Rate and Rhythm: Normal rate and regular rhythm. Heart sounds: Normal heart sounds. No murmur. No gallop. Pulmonary:      Effort: Pulmonary effort is normal. No respiratory distress. Breath sounds: Normal breath sounds. No wheezing or rales. Abdominal:      General: Bowel sounds are normal.      Palpations: Abdomen is soft. Tenderness: There is no abdominal tenderness. There is no guarding. Musculoskeletal:         General: Tenderness and signs of injury present. No deformity. Left hip: She exhibits decreased range of motion, tenderness and bony tenderness. She exhibits no deformity. Skin:     General: Skin is warm and dry. Neurological:      General: No focal deficit present. Mental Status: She is alert. Mental status is at baseline. Motor: No abnormal muscle tone.       Comments: cni 2-12 grossly   Psychiatric:         Mood and Affect: Mood normal.         Behavior: Behavior normal.          MDM       Procedures

## 2020-05-20 NOTE — ED TRIAGE NOTES
Pt arrived to ED via EMS from home. Pt c/o left hip pain after falling out of bed last night. Per EMS patient did not hit head and no loc.

## 2020-05-20 NOTE — H&P
Hospitalist H&P Note     Admit Date:  No admission date for patient encounter. Name:  [employer]   Age:  80 y.o.  :  1929   MRN:  [id number]   PCP:  Sarina Daly MD  Treatment Team: Attending Provider: Winifred Dakins, MD  H/o fall left hip pain  HPI:   80-year-old  can female patient with a known history of hypertension, dementia, hypothyroidism and osteoporosis. Patient was seen yesterday in emergency room for history of fall. She had 1 more fall last night and brought to the emergency room again for further evaluation . Patient has been complaining of left hip pain. Apart from fall and left hip pain of the 10 directions apart from the one mentioned above patient other review of systems were negative noncontributory. WBC of 11.5, creatinine of 1.28, GFR of 50. Left hip x-ray-  Impression: Question acute nondisplaced transcervical fracture of the left  femoral neck. MRI of the left hip without contrast may be more sensitive in  Evaluation. Patient will be [facility] for further management of left hip fracture and constipation(based on the x-ray KUB done yesterday). 10 systems reviewed and negative except as noted in HPI.   Past Medical History:   Diagnosis Date    Anxiety     managed with medication     Hematuria, microscopic     Hypercholesteremia     managed with medication     Hypertension     managed with medication     Hypothyroidism     managed with medication     IBS (irritable bowel syndrome)     no recent symptoms    Osteoporosis     managed with supplement    Sciatica     chronic    Short-term memory loss     managed with medication     Sleep apnea     CPAP      Past Surgical History:   Procedure Laterality Date    HX HYSTERECTOMY      tubal     HX SVT ABLATION      HX VITRECTOMY Bilateral       No Known Allergies   Social History     Tobacco Use    Smoking status: Never Smoker    Smokeless tobacco: Never Used Substance Use Topics    Alcohol use: No      Family History   Problem Relation Age of Onset    Stroke Father         There is no immunization history on file for this patient. PTA Medications:  Cannot display prior to admission medications because the patient has not been admitted in this contact. Medication reviewed  Objective:   No data found. No intake or output data in the 24 hours ending 05/20/20 2889    Physical Exam:  General:    Well nourished. Alert. Eyes:   Normal sclera. Extraocular movements intact. ENT:  Normocephalic, atraumatic. Moist mucous membranes  CV:   RRR. No murmur, rub, or gallop. Lungs:  CTAB. No wheezing, rhonchi, or rales. Abdomen: Soft, nontender, nondistended. Bowel sounds normal.   Extremities: Warm and dry. Complaining of severe pain on small movements of left lower extremity. Also complaining of left hip pain on moving right lower extremity. Able to do right hip flexion and right knee flexion without any difficulty but does complain of right hip pain on doing these movements. Neurologic: CN II-XII grossly intact. Sensation intact. Skin:     No rashes or jaundice. Psych:  Normal mood and affect. I reviewed the labs, imaging, EKGs, telemetry, and other studies done this admission.   Data Review:   Recent Results (from the past 24 hour(s))   TROPONIN I    Collection Time: 05/20/20  5:13 PM   Result Value Ref Range    Troponin-I, Qt. <0.02 (L) 0.02 - 6.61 NG/ML   METABOLIC PANEL, BASIC    Collection Time: 05/20/20  5:13 PM   Result Value Ref Range    Sodium 138 136 - 145 mmol/L    Potassium 3.7 3.5 - 5.1 mmol/L    Chloride 104 98 - 107 mmol/L    CO2 25 21 - 32 mmol/L    Anion gap 9 7 - 16 mmol/L    Glucose 94 65 - 100 mg/dL    BUN 21 8 - 23 MG/DL    Creatinine 1.28 (H) 0.6 - 1.0 MG/DL    GFR est AA 50 (L) >60 ml/min/1.73m2    GFR est non-AA 42 (L) >60 ml/min/1.73m2    Calcium 8.6 8.3 - 10.4 MG/DL   CBC WITH AUTOMATED DIFF    Collection Time: 05/20/20 5:13 PM   Result Value Ref Range    WBC 11.5 (H) 4.3 - 11.1 K/uL    RBC 3.64 (L) 4.05 - 5.2 M/uL    HGB 9.9 (L) 11.7 - 15.4 g/dL    HCT 31.3 (L) 35.8 - 46.3 %    MCV 86.0 79.6 - 97.8 FL    MCH 27.2 26.1 - 32.9 PG    MCHC 31.6 31.4 - 35.0 g/dL    RDW 15.2 (H) 11.9 - 14.6 %    PLATELET 675 773 - 567 K/uL    MPV 11.4 9.4 - 12.3 FL    ABSOLUTE NRBC 0.02 0.0 - 0.2 K/uL    DF AUTOMATED      NEUTROPHILS 77 43 - 78 %    LYMPHOCYTES 13 13 - 44 %    MONOCYTES 8 4.0 - 12.0 %    EOSINOPHILS 1 0.5 - 7.8 %    BASOPHILS 0 0.0 - 2.0 %    IMMATURE GRANULOCYTES 0 0.0 - 5.0 %    ABS. NEUTROPHILS 8.9 (H) 1.7 - 8.2 K/UL    ABS. LYMPHOCYTES 1.5 0.5 - 4.6 K/UL    ABS. MONOCYTES 0.9 0.1 - 1.3 K/UL    ABS. EOSINOPHILS 0.1 0.0 - 0.8 K/UL    ABS. BASOPHILS 0.1 0.0 - 0.2 K/UL    ABS. IMM. GRANS. 0.1 0.0 - 0.5 K/UL   PROTHROMBIN TIME + INR    Collection Time: 05/20/20  5:13 PM   Result Value Ref Range    Prothrombin time 13.2 12.0 - 14.7 sec    INR 1.0         All Micro Results     None          Other Studies:  Xr Hip Lt W Or Wo Pelv 2-3 Vws    Result Date: 5/20/2020  Exam:  AP pelvis and left hip radiographs History:  pain, left hip pain, 89 years Female Comparison: None available Findings: Mild degenerative changes are seen of the bilateral hip joints. An incomplete lucent line is seen through the transcervical portion of the left femoral neck, unclear whether this appearance could be artifactual or could represent an acute nondisplaced fracture. Normal alignment, joint spaces preserved. Mild diffuse osteopenia. Visualized soft tissues otherwise unremarkable. Impression: Question acute nondisplaced transcervical fracture of the left femoral neck. MRI of the left hip without contrast may be more sensitive in evaluation. Xr Chest Port    Result Date: 5/20/2020  History: Preoperative evaluation, left hip fracture Exam: portable chest Comparison: 5/19/2020 Findings: There is persistent scarring at the right lung base.  No new alveolar infiltrate. No change in the appearance of the mediastinal contour or osseous structures. Impressions: Stable portable chest       Assessment and Plan:     Hospital Problems as of 5/20/2020 Date Reviewed: 6/20/2017          Codes Class Noted - Resolved POA    * (Principal) Closed left hip fracture ([facility] ICD-10-CM: H62.264Q  ICD-9-CM: 820.8  5/20/2020 - Present Unknown        Essential hypertension ICD-10-CM: I10  ICD-9-CM: 401.9  5/20/2020 - Present Unknown        Acquired hypothyroidism ICD-10-CM: E03.9  ICD-9-CM: 244.9  5/20/2020 - Present Unknown        Depression ICD-10-CM: F32.9  ICD-9-CM: 311  5/20/2020 - Present Unknown        Dementia ([facility] ICD-10-CM: F03.90  ICD-9-CM: 294.20  5/20/2020 - Present Unknown        CKD (chronic kidney disease) stage 3, GFR 30-59 ml/min ([facility]) ICD-10-CM: N18.3  ICD-9-CM: 585.3  5/20/2020 - Present Unknown              PLAN:  -Left hip fracture second to [address] [place] [address] physician consulted Ortho, n.p.o. from midnight, patient would be cleared for surgery, moderate to high risk based on her age and medical problems. Continue pain medication PRN, 2 g low-sodium diet until midnight.  -Constipation-patient was prescribed mag citrate by the ER physician yesterday, daughter states she could not give her that medication yet. We will give a dose of mag citrate.  -Hypertension  -Dementia  -Hypothyroidism  -CKD stage III-gentle hydration normal saline at 100 cc/h    Spoke to daughter Kamilah Greenberg [phone]- pt is full code.     DVT ppx: SCD  Anticipated DC needs:    Code status:  Full  Estimated LOS:  Greater than 2 midnights  Risk:  high    Signed:  Bridgette Glasgow MD

## 2020-05-21 ENCOUNTER — APPOINTMENT (OUTPATIENT)
Dept: GENERAL RADIOLOGY | Age: 85
DRG: 482 | End: 2020-05-21
Attending: ORTHOPAEDIC SURGERY
Payer: MEDICARE

## 2020-05-21 ENCOUNTER — ANESTHESIA (OUTPATIENT)
Dept: SURGERY | Age: 85
DRG: 482 | End: 2020-05-21
Payer: MEDICARE

## 2020-05-21 ENCOUNTER — ANESTHESIA EVENT (OUTPATIENT)
Dept: SURGERY | Age: 85
DRG: 482 | End: 2020-05-21
Payer: MEDICARE

## 2020-05-21 LAB
ANION GAP SERPL CALC-SCNC: 3 MMOL/L (ref 7–16)
ATRIAL RATE: 78 BPM
BASOPHILS # BLD: 0 K/UL (ref 0–0.2)
BASOPHILS # BLD: 0 K/UL (ref 0–0.2)
BASOPHILS NFR BLD: 1 % (ref 0–2)
BASOPHILS NFR BLD: 1 % (ref 0–2)
BUN SERPL-MCNC: 21 MG/DL (ref 8–23)
CALCIUM SERPL-MCNC: 8.5 MG/DL (ref 8.3–10.4)
CALCULATED P AXIS, ECG09: 56 DEGREES
CALCULATED R AXIS, ECG10: 11 DEGREES
CALCULATED T AXIS, ECG11: 29 DEGREES
CHLORIDE SERPL-SCNC: 108 MMOL/L (ref 98–107)
CO2 SERPL-SCNC: 30 MMOL/L (ref 21–32)
CREAT SERPL-MCNC: 1.05 MG/DL (ref 0.6–1)
DIAGNOSIS, 93000: NORMAL
DIFFERENTIAL METHOD BLD: ABNORMAL
DIFFERENTIAL METHOD BLD: ABNORMAL
EOSINOPHIL # BLD: 0.2 K/UL (ref 0–0.8)
EOSINOPHIL # BLD: 0.2 K/UL (ref 0–0.8)
EOSINOPHIL NFR BLD: 2 % (ref 0.5–7.8)
EOSINOPHIL NFR BLD: 3 % (ref 0.5–7.8)
ERYTHROCYTE [DISTWIDTH] IN BLOOD BY AUTOMATED COUNT: 14.9 % (ref 11.9–14.6)
ERYTHROCYTE [DISTWIDTH] IN BLOOD BY AUTOMATED COUNT: 15.2 % (ref 11.9–14.6)
GLUCOSE SERPL-MCNC: 87 MG/DL (ref 65–100)
HCT VFR BLD AUTO: 29.8 % (ref 35.8–46.3)
HCT VFR BLD AUTO: 31 % (ref 35.8–46.3)
HGB BLD-MCNC: 10 G/DL (ref 11.7–15.4)
HGB BLD-MCNC: 9.5 G/DL (ref 11.7–15.4)
IMM GRANULOCYTES # BLD AUTO: 0 K/UL (ref 0–0.5)
IMM GRANULOCYTES # BLD AUTO: 0 K/UL (ref 0–0.5)
IMM GRANULOCYTES NFR BLD AUTO: 0 % (ref 0–5)
IMM GRANULOCYTES NFR BLD AUTO: 1 % (ref 0–5)
LYMPHOCYTES # BLD: 0.9 K/UL (ref 0.5–4.6)
LYMPHOCYTES # BLD: 1.1 K/UL (ref 0.5–4.6)
LYMPHOCYTES NFR BLD: 11 % (ref 13–44)
LYMPHOCYTES NFR BLD: 13 % (ref 13–44)
MCH RBC QN AUTO: 27.5 PG (ref 26.1–32.9)
MCH RBC QN AUTO: 27.9 PG (ref 26.1–32.9)
MCHC RBC AUTO-ENTMCNC: 31.9 G/DL (ref 31.4–35)
MCHC RBC AUTO-ENTMCNC: 32.3 G/DL (ref 31.4–35)
MCV RBC AUTO: 86.4 FL (ref 79.6–97.8)
MCV RBC AUTO: 86.4 FL (ref 79.6–97.8)
MM INDURATION POC: 0 MM (ref 0–5)
MONOCYTES # BLD: 0.6 K/UL (ref 0.1–1.3)
MONOCYTES # BLD: 0.7 K/UL (ref 0.1–1.3)
MONOCYTES NFR BLD: 8 % (ref 4–12)
MONOCYTES NFR BLD: 9 % (ref 4–12)
NEUTS SEG # BLD: 6.4 K/UL (ref 1.7–8.2)
NEUTS SEG # BLD: 6.4 K/UL (ref 1.7–8.2)
NEUTS SEG NFR BLD: 75 % (ref 43–78)
NEUTS SEG NFR BLD: 79 % (ref 43–78)
NRBC # BLD: 0 K/UL (ref 0–0.2)
NRBC # BLD: 0 K/UL (ref 0–0.2)
P-R INTERVAL, ECG05: 216 MS
PLATELET # BLD AUTO: 207 K/UL (ref 150–450)
PLATELET # BLD AUTO: 214 K/UL (ref 150–450)
PMV BLD AUTO: 10.9 FL (ref 9.4–12.3)
PMV BLD AUTO: 11.1 FL (ref 9.4–12.3)
POTASSIUM SERPL-SCNC: 4.1 MMOL/L (ref 3.5–5.1)
PPD POC: NEGATIVE NEGATIVE
Q-T INTERVAL, ECG07: 354 MS
QRS DURATION, ECG06: 78 MS
QTC CALCULATION (BEZET), ECG08: 403 MS
RBC # BLD AUTO: 3.45 M/UL (ref 4.05–5.2)
RBC # BLD AUTO: 3.59 M/UL (ref 4.05–5.2)
SODIUM SERPL-SCNC: 141 MMOL/L (ref 136–145)
VENTRICULAR RATE, ECG03: 78 BPM
WBC # BLD AUTO: 8.1 K/UL (ref 4.3–11.1)
WBC # BLD AUTO: 8.5 K/UL (ref 4.3–11.1)

## 2020-05-21 PROCEDURE — 77010033678 HC OXYGEN DAILY

## 2020-05-21 PROCEDURE — 80048 BASIC METABOLIC PNL TOTAL CA: CPT

## 2020-05-21 PROCEDURE — 77030018836 HC SOL IRR NACL ICUM -A: Performed by: ORTHOPAEDIC SURGERY

## 2020-05-21 PROCEDURE — 94760 N-INVAS EAR/PLS OXIMETRY 1: CPT

## 2020-05-21 PROCEDURE — C1769 GUIDE WIRE: HCPCS | Performed by: ORTHOPAEDIC SURGERY

## 2020-05-21 PROCEDURE — 36415 COLL VENOUS BLD VENIPUNCTURE: CPT

## 2020-05-21 PROCEDURE — 77030020275 HC MISC ORTHOPEDIC: Performed by: ORTHOPAEDIC SURGERY

## 2020-05-21 PROCEDURE — 85025 COMPLETE CBC W/AUTO DIFF WBC: CPT

## 2020-05-21 PROCEDURE — 76010000138 HC OR TIME 0.5 TO 1 HR: Performed by: ORTHOPAEDIC SURGERY

## 2020-05-21 PROCEDURE — 76060000032 HC ANESTHESIA 0.5 TO 1 HR: Performed by: ORTHOPAEDIC SURGERY

## 2020-05-21 PROCEDURE — 74011250636 HC RX REV CODE- 250/636: Performed by: ORTHOPAEDIC SURGERY

## 2020-05-21 PROCEDURE — 74011250637 HC RX REV CODE- 250/637: Performed by: FAMILY MEDICINE

## 2020-05-21 PROCEDURE — 74011000250 HC RX REV CODE- 250: Performed by: ANESTHESIOLOGY

## 2020-05-21 PROCEDURE — 74011250636 HC RX REV CODE- 250/636: Performed by: FAMILY MEDICINE

## 2020-05-21 PROCEDURE — 77030003665 HC NDL SPN BBMI -A: Performed by: ANESTHESIOLOGY

## 2020-05-21 PROCEDURE — 76210000006 HC OR PH I REC 0.5 TO 1 HR: Performed by: ORTHOPAEDIC SURGERY

## 2020-05-21 PROCEDURE — 73502 X-RAY EXAM HIP UNI 2-3 VIEWS: CPT

## 2020-05-21 PROCEDURE — 77030002933 HC SUT MCRYL J&J -A: Performed by: ORTHOPAEDIC SURGERY

## 2020-05-21 PROCEDURE — 77030007880 HC KT SPN EPDRL BBMI -B: Performed by: ANESTHESIOLOGY

## 2020-05-21 PROCEDURE — 74011250637 HC RX REV CODE- 250/637: Performed by: ORTHOPAEDIC SURGERY

## 2020-05-21 PROCEDURE — 65270000029 HC RM PRIVATE

## 2020-05-21 PROCEDURE — 77030020274 HC MISC IMPL ORTHOPEDIC: Performed by: ORTHOPAEDIC SURGERY

## 2020-05-21 PROCEDURE — 74011250636 HC RX REV CODE- 250/636: Performed by: ANESTHESIOLOGY

## 2020-05-21 PROCEDURE — 74011000250 HC RX REV CODE- 250: Performed by: NURSE ANESTHETIST, CERTIFIED REGISTERED

## 2020-05-21 PROCEDURE — 73501 X-RAY EXAM HIP UNI 1 VIEW: CPT

## 2020-05-21 PROCEDURE — 0QS704Z REPOSITION LEFT UPPER FEMUR WITH INTERNAL FIXATION DEVICE, OPEN APPROACH: ICD-10-PCS | Performed by: ORTHOPAEDIC SURGERY

## 2020-05-21 PROCEDURE — 77030018673: Performed by: ORTHOPAEDIC SURGERY

## 2020-05-21 PROCEDURE — 77030014405 HC GD ROD RMR SYNT -C: Performed by: ORTHOPAEDIC SURGERY

## 2020-05-21 PROCEDURE — 74011250636 HC RX REV CODE- 250/636: Performed by: NURSE ANESTHETIST, CERTIFIED REGISTERED

## 2020-05-21 PROCEDURE — 77030017016 HC DSG ANTIMIC BARR2 S&N -B: Performed by: ORTHOPAEDIC SURGERY

## 2020-05-21 RX ORDER — MAG HYDROX/ALUMINUM HYD/SIMETH 200-200-20
30 SUSPENSION, ORAL (FINAL DOSE FORM) ORAL
Status: DISCONTINUED | OUTPATIENT
Start: 2020-05-21 | End: 2020-05-28 | Stop reason: HOSPADM

## 2020-05-21 RX ORDER — ONDANSETRON 2 MG/ML
4 INJECTION INTRAMUSCULAR; INTRAVENOUS
Status: DISCONTINUED | OUTPATIENT
Start: 2020-05-21 | End: 2020-05-28 | Stop reason: HOSPADM

## 2020-05-21 RX ORDER — FERROUS SULFATE, DRIED 160(50) MG
1 TABLET, EXTENDED RELEASE ORAL
Status: DISCONTINUED | OUTPATIENT
Start: 2020-05-21 | End: 2020-05-28 | Stop reason: HOSPADM

## 2020-05-21 RX ORDER — OXYCODONE HYDROCHLORIDE 5 MG/1
5 TABLET ORAL
Status: DISCONTINUED | OUTPATIENT
Start: 2020-05-21 | End: 2020-05-21 | Stop reason: HOSPADM

## 2020-05-21 RX ORDER — PROPOFOL 10 MG/ML
INJECTION, EMULSION INTRAVENOUS
Status: DISCONTINUED | OUTPATIENT
Start: 2020-05-21 | End: 2020-05-21 | Stop reason: HOSPADM

## 2020-05-21 RX ORDER — SODIUM CHLORIDE, SODIUM LACTATE, POTASSIUM CHLORIDE, CALCIUM CHLORIDE 600; 310; 30; 20 MG/100ML; MG/100ML; MG/100ML; MG/100ML
75 INJECTION, SOLUTION INTRAVENOUS CONTINUOUS
Status: DISCONTINUED | OUTPATIENT
Start: 2020-05-21 | End: 2020-05-28 | Stop reason: HOSPADM

## 2020-05-21 RX ORDER — LIDOCAINE HYDROCHLORIDE 20 MG/ML
INJECTION, SOLUTION EPIDURAL; INFILTRATION; INTRACAUDAL; PERINEURAL AS NEEDED
Status: DISCONTINUED | OUTPATIENT
Start: 2020-05-21 | End: 2020-05-21 | Stop reason: HOSPADM

## 2020-05-21 RX ORDER — EPHEDRINE SULFATE/0.9% NACL/PF 50 MG/5 ML
SYRINGE (ML) INTRAVENOUS AS NEEDED
Status: DISCONTINUED | OUTPATIENT
Start: 2020-05-21 | End: 2020-05-21 | Stop reason: HOSPADM

## 2020-05-21 RX ORDER — PROPOFOL 10 MG/ML
INJECTION, EMULSION INTRAVENOUS AS NEEDED
Status: DISCONTINUED | OUTPATIENT
Start: 2020-05-21 | End: 2020-05-21 | Stop reason: HOSPADM

## 2020-05-21 RX ORDER — ASPIRIN 325 MG
325 TABLET ORAL DAILY
Status: DISCONTINUED | OUTPATIENT
Start: 2020-05-22 | End: 2020-05-28 | Stop reason: HOSPADM

## 2020-05-21 RX ORDER — CEFAZOLIN SODIUM/WATER 2 G/20 ML
2 SYRINGE (ML) INTRAVENOUS ONCE
Status: COMPLETED | OUTPATIENT
Start: 2020-05-21 | End: 2020-05-21

## 2020-05-21 RX ORDER — SODIUM CHLORIDE 0.9 % (FLUSH) 0.9 %
5-40 SYRINGE (ML) INJECTION EVERY 8 HOURS
Status: DISCONTINUED | OUTPATIENT
Start: 2020-05-21 | End: 2020-05-21

## 2020-05-21 RX ORDER — HYDROCODONE BITARTRATE AND ACETAMINOPHEN 5; 325 MG/1; MG/1
2 TABLET ORAL
Status: DISCONTINUED | OUTPATIENT
Start: 2020-05-21 | End: 2020-05-28 | Stop reason: HOSPADM

## 2020-05-21 RX ORDER — SODIUM CHLORIDE 0.9 % (FLUSH) 0.9 %
5-40 SYRINGE (ML) INJECTION AS NEEDED
Status: DISCONTINUED | OUTPATIENT
Start: 2020-05-21 | End: 2020-05-28 | Stop reason: HOSPADM

## 2020-05-21 RX ORDER — BUPIVACAINE HYDROCHLORIDE 7.5 MG/ML
INJECTION, SOLUTION INTRASPINAL AS NEEDED
Status: DISCONTINUED | OUTPATIENT
Start: 2020-05-21 | End: 2020-05-21 | Stop reason: HOSPADM

## 2020-05-21 RX ORDER — ALBUTEROL SULFATE 0.83 MG/ML
2.5 SOLUTION RESPIRATORY (INHALATION) AS NEEDED
Status: DISCONTINUED | OUTPATIENT
Start: 2020-05-21 | End: 2020-05-21 | Stop reason: HOSPADM

## 2020-05-21 RX ORDER — HYDROMORPHONE HYDROCHLORIDE 2 MG/ML
0.5 INJECTION, SOLUTION INTRAMUSCULAR; INTRAVENOUS; SUBCUTANEOUS
Status: DISCONTINUED | OUTPATIENT
Start: 2020-05-21 | End: 2020-05-21 | Stop reason: HOSPADM

## 2020-05-21 RX ORDER — MORPHINE SULFATE 2 MG/ML
2 INJECTION, SOLUTION INTRAMUSCULAR; INTRAVENOUS
Status: DISCONTINUED | OUTPATIENT
Start: 2020-05-21 | End: 2020-05-28 | Stop reason: HOSPADM

## 2020-05-21 RX ORDER — ONDANSETRON 2 MG/ML
4 INJECTION INTRAMUSCULAR; INTRAVENOUS
Status: DISCONTINUED | OUTPATIENT
Start: 2020-05-21 | End: 2020-05-21 | Stop reason: HOSPADM

## 2020-05-21 RX ADMIN — FLUOXETINE 40 MG: 20 CAPSULE ORAL at 08:24

## 2020-05-21 RX ADMIN — SODIUM CHLORIDE, SODIUM LACTATE, POTASSIUM CHLORIDE, AND CALCIUM CHLORIDE 75 ML/HR: 600; 310; 30; 20 INJECTION, SOLUTION INTRAVENOUS at 15:35

## 2020-05-21 RX ADMIN — PHENYLEPHRINE HYDROCHLORIDE 100 MCG: 10 INJECTION INTRAVENOUS at 16:19

## 2020-05-21 RX ADMIN — LIDOCAINE HYDROCHLORIDE 60 MG: 20 INJECTION, SOLUTION EPIDURAL; INFILTRATION; INTRACAUDAL; PERINEURAL at 16:05

## 2020-05-21 RX ADMIN — METOPROLOL SUCCINATE 50 MG: 50 TABLET, EXTENDED RELEASE ORAL at 08:25

## 2020-05-21 RX ADMIN — SODIUM CHLORIDE, SODIUM LACTATE, POTASSIUM CHLORIDE, AND CALCIUM CHLORIDE 75 ML/HR: 600; 310; 30; 20 INJECTION, SOLUTION INTRAVENOUS at 21:41

## 2020-05-21 RX ADMIN — Medication 1 TABLET: at 21:52

## 2020-05-21 RX ADMIN — CYPROHEPTADINE HYDROCHLORIDE 4 MG: 4 TABLET ORAL at 08:24

## 2020-05-21 RX ADMIN — PROPOFOL 40 MG: 10 INJECTION, EMULSION INTRAVENOUS at 16:05

## 2020-05-21 RX ADMIN — POTASSIUM CHLORIDE 20 MEQ: 20 TABLET, EXTENDED RELEASE ORAL at 08:25

## 2020-05-21 RX ADMIN — BUPIVACAINE HYDROCHLORIDE IN DEXTROSE 1.3 ML: 7.5 INJECTION, SOLUTION SUBARACHNOID at 16:13

## 2020-05-21 RX ADMIN — AMLODIPINE BESYLATE 5 MG: 5 TABLET ORAL at 05:36

## 2020-05-21 RX ADMIN — PROPOFOL 75 MCG/KG/MIN: 10 INJECTION, EMULSION INTRAVENOUS at 16:05

## 2020-05-21 RX ADMIN — LEVOTHYROXINE SODIUM 50 MCG: 0.05 TABLET ORAL at 05:36

## 2020-05-21 RX ADMIN — SODIUM CHLORIDE 100 ML/HR: 900 INJECTION, SOLUTION INTRAVENOUS at 08:23

## 2020-05-21 RX ADMIN — HYDROCODONE BITARTRATE AND ACETAMINOPHEN 1 TABLET: 5; 325 TABLET ORAL at 21:52

## 2020-05-21 RX ADMIN — BUSPIRONE HYDROCHLORIDE 5 MG: 5 TABLET ORAL at 08:24

## 2020-05-21 RX ADMIN — Medication 10 ML: at 21:51

## 2020-05-21 RX ADMIN — Medication 2 G: at 16:18

## 2020-05-21 RX ADMIN — Medication 5 MG: at 16:20

## 2020-05-21 RX ADMIN — HYDROCODONE BITARTRATE AND ACETAMINOPHEN 1 TABLET: 5; 325 TABLET ORAL at 05:36

## 2020-05-21 NOTE — ANESTHESIA PREPROCEDURE EVALUATION
Anesthetic History   No history of anesthetic complications            Review of Systems / Medical History  Patient summary reviewed, nursing notes reviewed and pertinent labs reviewed    Pulmonary        Sleep apnea: CPAP           Neuro/Psych         Psychiatric history (anxiety)    Comments: Memory loss Cardiovascular    Hypertension          Hyperlipidemia    Exercise tolerance: >4 METS     GI/Hepatic/Renal  Within defined limits              Endo/Other      Hypothyroidism: well controlled  Anemia     Other Findings              Physical Exam    Airway  Mallampati: III      Mouth opening: Normal     Cardiovascular  Regular rate and rhythm,  S1 and S2 normal,  no murmur, click, rub, or gallop  Rhythm: regular  Rate: normal      Pertinent negatives: No murmur   Dental    Dentition: Full lower dentures and Full upper dentures     Pulmonary  Breath sounds clear to auscultation               Abdominal         Other Findings            Anesthetic Plan    ASA: 3  Anesthesia type: spinal          Induction: Intravenous  Anesthetic plan and risks discussed with: Patient

## 2020-05-21 NOTE — PROGRESS NOTES
Nutrition:  BPA alert for malnutrition screen acknowledged. Flowsheet has been updated and current score reflects a score of 1. Nutrition assessment not indicated at this time. RD to follow per standard of care.       Omar Ortiz [place], [address] [place] [address], [address]

## 2020-05-21 NOTE — OP NOTES
Operative Report    Patient: Oren Echavarria MRN: [id number]  SSN: [id number]    YOB: 1929  Age: 80 y.o. Sex: female       Date of Surgery: 5/21/2020     History:  Oren Echavarria is a 80 y.o. female fell and was seen in the emergency room and found to have a left femoral neck fracture that was nondisplaced. Did have a chance to talk to her and we also talked to her family regarding the need for operative fixation of this nondisplaced femoral neck fracture. I did think that plate and screw fixation would be most appropriate for this since it was nondisplaced. I talked her about what this would involve. She and the family seem to understand and wished to proceed. I talked to the patient and/or their representative and explained the exact nature the procedure. I also went through a detailed list of the material risks associated with  the procedure which included risk of bleeding, infection, injury to nearby structures, worsening the situation, as well as the risks associate with anesthesia and finally death. Also talked with him regarding the benefits and alternatives to the procedure. Preoperative Diagnosis: Left femoral neck fracture     Postoperative Diagnosis: Left closed nondisplaced left femoral neck fracture    Surgeon(s) and Role:     * Js Ferreira MD - Primary    Anesthesia: General     Procedure: Procedure(s):  Open treatment left femoral neck fracture with plate and screw fixation    Procedure in Detail: After the successful induction of spinal anesthesia the left lower extremity was placed in boot traction on a fracture table. At that point we made sure we could visualize the hip on both the AP and lateral projection with the C arm image intensifier. Once this was confirmed we then prepped and draped the  hip. After this was done I then used a guidewire to localize my incision and then made a small incision laterally over the left hip.   I placed a guidewire into the center the femoral head on both the AP and the lateral projection. After placing the guidewire and confirmed its position I then measured for a 80 mm screw. Once the screw and plate construct was assembled we then placed this and confirmed its position on both AP and lateral projection. I then used the outrigger device and drilled for and placed 1 screw in the shaft portion of the plate. Once this was in appropriate position I checked my final reduction as well as the placement of my hardware on both AP and lateral projection. I was pleased with this. I then remove the insertion device and closed my incision with two-point 0 Monocryl for the subcutaneous tissue and staples for the skin. Dressings were applied and the patient was awakened and taken to the recovery room in stable condition. Estimated Blood Loss: 60 cc    Tourniquet Time: * No tourniquets in log *      Implants:   Implant Name Type Inv. Item Serial No.  Lot No. LRB No. Used Action   femoral neck system plate 1 hole     DEPUY [employer]THO [id number] Left 1 Implanted   BOLT FOR FEMORAL NECK SYSTEM 88 MM LENGTH     DEPUY ORTHO [id number] Left 1 Implanted   ANTIROTATION SCREW FOR FEMORAL NECK SYSTEM 85MM     DEPUY ORTHO [id number] Left 1 Implanted   5. 0MM LOCKING SCR SLF-TPNG W/T25 STARDRIVE 73PU    DEPUY ORTHO [id number] Left 1 Implanted               Specimens: * No specimens in log *        Drains: None                Complications: None    Counts: Sponge and needle counts were correct times two.     Signed By:  Joesph Hernandez MD     May 21, 2020

## 2020-05-21 NOTE — PERIOP NOTES
TRANSFER - OUT REPORT:    Verbal report given to Oscar Jesus on Yariel Fierro  being transferred to [phone] for routine post - op       Report consisted of patients Situation, Background, Assessment and   Recommendations(SBAR). Information from the following report(s) OR Summary, Procedure Summary, Intake/Output and MAR was reviewed with the receiving nurse. Lines:   Peripheral IV 05/20/20 Left Forearm (Active)   Site Assessment Clean, dry, & intact 5/21/2020  4:48 PM   Phlebitis Assessment 0 5/21/2020  4:48 PM   Infiltration Assessment 0 5/21/2020  4:48 PM   Dressing Status Clean, dry, & intact 5/21/2020  4:48 PM   Dressing Type Tape;Transparent 5/21/2020  4:48 PM   Hub Color/Line Status Blue;Patent 5/21/2020  4:48 PM   Action Taken Blood drawn 5/20/2020  5:15 PM   Alcohol Cap Used No 5/21/2020  4:48 PM        Opportunity for questions and clarification was provided. Patient transported with:   O2 @ 2 liters    VTE prophylaxis orders have not been written for Yariel Fierro. Patient and family given floor number and nurses name. Family updated re: pt status after security code verified.

## 2020-05-21 NOTE — PERIOP NOTES
TRANSFER - IN REPORT:    Verbal report received from Shabbir Santacruz on Peggyann Right  being received from 7th floor for ordered procedure      Report consisted of patients Situation, Background, Assessment and   Recommendations(SBAR). Information from the following report(s) SBAR and MAR was reviewed with the receiving nurse. Opportunity for questions and clarification was provided. Assessment will be completed upon patients arrival to unit and care assumed.

## 2020-05-21 NOTE — PROGRESS NOTES
Pt is a 79 yo female admitted for surgical repair of a hip fracture. Pt is scheduled for surgery today. TC to pt's dtr, Darrell Skinner.  She confirmed that she lives with the pt an is her primary caregiver. She stated that the pt is able to ambulate independently, albeit slowly. The pt has a walker but tends to not use it. Pt has a shower chair and the dtr assists with showers. Pt can sponge-bathe independently, dress herself and feed herself. Pt's dtr cooks, cleans and manages the pt's medications. Pt has no history of HH or STR placements. Insurance and PCP confirmed. I have confirmed that the patient has the capacity at home to be able to do a virtual visit with their PCP. The family wants the pt to dc to home with [facility] services. No agency preference and agreeable to referral to [facility]. Dtr is agreeable to SW emailing her a list of SNFs that are in network with the pt's insurance should STR be needed. Email sent. PPD placed 5/20/2020. SW following to continue to assist with pt's dc needs.     Care Management Interventions  PCP Verified by CM: Yes(virtual visit)  Last Visit to PCP: 05/20/20  Mode of Transport at Discharge: (TBD)  Transition of Care Consult (CM Consult): Discharge Planning  Discharge Durable Medical Equipment: No  Physical Therapy Consult: (to be ordered post-op)  Occupational Therapy Consult: (to be ordered post-op)  Current Support Network: Own Home, Family Lives Rozina Fuller, lives with her and is her primary cg)  Confirm Follow Up Transport: Family  Discharge Location  Discharge Placement: Unable to determine at this time

## 2020-05-21 NOTE — CDMP QUERY
Pt admitted with Closed left hip fracture. Pt noted to have hx of osteoporosis documented. If possible, please document in progress notes and discharge summary if you are evaluating and/or treating any of the following: 
 
? Pathological  (bone location such as femur) Fracture ? Osteoporotic  Fracture ? Osteoporotic  fracture following fall which would not usually break a normal, healthy bone ? Traumatic fracture 
? Other, please specify ? Clinically unable to determine The medical record reflects the following: 
 
  Risk Factors: 80 yr, Hx osteoporosis, falls Clinical Indicators:  X-ray- fracture of the left femoral neck, on supplements of osteoporosis ( name not documented), multiple falls Treatment: ortho consult, ORIF of left femoral neck fracture scheduled Thanks, Jack Bailon, RN Compliant Documentation Management Program 
[phone]

## 2020-05-21 NOTE — PROGRESS NOTES
05/20/20 1915   Dual Skin Pressure Injury Assessment   Dual Skin Pressure Injury Assessment WDL   Second Care Provider (Based on [address] [place] [address]) Jazmine Cason RN    Skin Integumentary   Skin Integumentary (WDL) WDL   Skin Color Appropriate for ethnicity   Skin Condition/Temp Warm;Dry   Skin Integrity Intact   Turgor Non-tenting   Hair Growth Present   Varicosities Absent   Wound Prevention and Protection Methods   Orientation of Wound Prevention Posterior   Location of Wound Prevention Sacrum/Coccyx   Dressing Present  No   Wound Offloading (Prevention Methods) Bed, pressure reduction mattress;Pillows

## 2020-05-21 NOTE — CONSULTS
Consult    Patient: Gabriele Looney MRN: [id number]  SSN: [id number]    YOB: 1929  Age: 80 y.o. Sex: female      Subjective:      Gabriele Looney is a 80 y.o. female who presented to the emergency room yesterday after having a second fall. Apparently she is fallen a couple times the last couple of days. She had plain films x-rays in the emergency room which showed a nondisplaced left femoral neck fracture. She is able to answer most of my questions but she does seem to be a little confused. Is difficult to get a very detailed history from her. She seems to complain of some pain in her left hip. He says she also has some pain which she describes is basically in her sacrum on the left side as well. No problems with her bilateral upper extremities and she seems to have very little pain with her right hip I have also tried to call her daughter this morning is been unable to reach her so far.     Past Medical History:   Diagnosis Date    Anxiety     managed with medication     Hematuria, microscopic     Hypercholesteremia     managed with medication     Hypertension     managed with medication     Hypothyroidism     managed with medication     IBS (irritable bowel syndrome)     no recent symptoms    Osteoporosis     managed with supplement    Sciatica     chronic    Short-term memory loss     managed with medication     Sleep apnea     CPAP     Past Surgical History:   Procedure Laterality Date    HX HYSTERECTOMY      tubal     HX SVT ABLATION      HX VITRECTOMY Bilateral       FAMHX -No history of inflammatory arthritis   Social History     Tobacco Use    Smoking status: Never Smoker    Smokeless tobacco: Never Used   Substance Use Topics    Alcohol use: No      Current Facility-Administered Medications   Medication Dose Route Frequency Provider Last Rate Last Dose    tuberculin injection 5 Units  5 Units IntraDERMal Carlota Mason MD   5 Units at 05/20/20 1957    amLODIPine (NORVASC) tablet 5 mg  5 mg Oral 7am Hermes Mckinney MD   5 mg at 05/21/20 0536    cyproheptadine (PERIACTIN) tablet 4 mg  4 mg Oral DAILY Hermes Mckinney MD        busPIRone (BUSPAR) tablet 5 mg  5 mg Oral DAILY Hermes Mckinney MD        metoprolol succinate (TOPROL-XL) XL tablet 50 mg  50 mg Oral DAILY Hermes Mckinney MD        levothyroxine (SYNTHROID) tablet 50 mcg  50 mcg Oral Anton Cash MD   50 mcg at 05/21/20 0536    FLUoxetine (PROzac) capsule 40 mg  40 mg Oral DAILY Hermes Mckinney MD        potassium chloride (K-DUR, KLOR-CON) SR tablet 20 mEq  20 mEq Oral DAILY Hermes Mckinney MD        sodium chloride (NS) flush 5-40 mL  5-40 mL IntraVENous Q8H Hermes Mckinney MD        sodium chloride (NS) flush 5-40 mL  5-40 mL IntraVENous PRN Hermes Mckinney MD        acetaminophen (TYLENOL) tablet 650 mg  650 mg Oral Q4H PRN Hermes Mckinney MD        HYDROcodone-acetaminophen (NORCO) 5-325 mg per tablet 1 Tab  1 Tab Oral Q4H PRN Hermes Mckinney MD   1 Tab at 05/21/20 0536    morphine injection 1 mg  1 mg IntraVENous Q4H PRN Hermes Mckinney MD        naloxone [facility]) injection 0.4 mg  0.4 mg IntraVENous PRN Hermes Mckinney MD       [facility] diphenhydrAMINE (BENADRYL) injection 12.5 mg  12.5 mg IntraVENous Q4H PRN Hermes Mckinney MD        ondansetron (ZOFRAN) injection 4 mg  4 mg IntraVENous Q4H PRN Hermes Mckinney MD        magnesium hydroxide (MILK OF MAGNESIA) 400 mg/5 mL oral suspension 30 mL  30 mL Oral DAILY PRN Hermes Mckinney MD        0.9% sodium chloride infusion  100 mL/hr IntraVENous CONTINUOUS Hermes Mckinney  mL/hr at 05/20/20 1957 100 mL/hr at 05/20/20 1957        No Known Allergies    Review of Systems: Not really sure with her mental status if she is able to give an accurate review of systems  A comprehensive review of systems was negative except for that written in the History of Present Illness.     Objective:     Vitals: 05/20/20 1918 05/20/20 2219 05/21/20 0333   BP: 153/85 173/82 149/88   Pulse: 73 72 67   Resp: 17 17 17   Temp: 98.1 °F (36.7 °C) 96.9 °F (36.1 °C) 98.1 °F (36.7 °C)   SpO2: 95% 96% 96%        Physical Exam:  Physical Exam:  General:  Alert, cooperative, no distress, appears stated age. Orientation she is alert and oriented at least to person   Eyes:  Conjunctivae/corneas clear. PERRL, EOMs intact. Fundi benign   Ears:  Normal TMs and external ear canals both ears. Nose: Nares normal. Septum midline. Mucosa normal. No drainage or sinus tenderness. Mouth/Throat: Lips, mucosa, and tongue normal. Teeth and gums normal.   Neck: Supple, symmetrical, trachea midline, no adenopathy, thyroid: no enlargment/tenderness/nodules, no carotid bruit and no JVD. Back:   Symmetric, no curvature. ROM normal. No CVA tenderness. Lungs:   Clear to auscultation bilaterally. Heart:  Regular rate and rhythm, S1, S2 normal, no murmur, click, rub or gallop. Abdomen:   Soft, non-tender. Bowel sounds normal. No masses,  No organomegaly. No lymphadenopathy in all 4 extremities  Alignment- pt has some obvious deformity of the left hip  Range of motion- with any range of motion leftt hip  Vascular-distal pulses palpable in left lower extremity  Sensory/motor-deep tendon reflexes normal left lower extremity. Motor and sensory function intact.   Stability- is difficult to assess stability because of the presence of the fracture  Tenderness to palpation over the leftt greater trochanter area  Skin- no rashes ulcerations or open wounds left lower extremity  Gait- cannot put any weight on the leftt lower extremity      Assessment:     Hospital Problems  Date Reviewed: 6/20/2017          Codes Class Noted POA    * (Principal) Closed left hip fracture ([facility] [address] ICD-10-CM: S69.089I  ICD-9-CM: 820.8  5/20/2020 Unknown        Essential hypertension ICD-10-CM: I10  ICD-9-CM: 401.9  5/20/2020 Unknown        Acquired hypothyroidism ICD-10-CM: E03.9  ICD-9-CM: 244.9  5/20/2020 Unknown        Depression ICD-10-CM: F32.9  ICD-9-CM: 179  5/20/2020 Unknown        Dementia ([address] ICD-10-CM: F03.90  ICD-9-CM: 294.20  5/20/2020 Unknown        CKD (chronic kidney disease) stage 3, GFR 30-59 ml/min (formerly [facility]) ICD-10-CM: N18.3  ICD-9-CM: 585.3  5/20/2020 Unknown        Constipation ICD-10-CM: K59.00  ICD-9-CM: 564.00  5/20/2020 Unknown            Left nondisplaced femoral neck fracture    Xrays and or studies:    Views of the left hip shows what appears to be a completely nondisplaced left femoral neck fracture  Plan:     I think this would best be treated operatively. The plan will proceed with open reduction internal fixation of left femoral neck fracture today in the operating room around lunchtime.     Signed By: Latrice Dowd MD

## 2020-05-21 NOTE — PROGRESS NOTES
History of Present Illness/Hospital Course:  70-year-old female presenting to the emergency department on 5/20/20 after two consecutive falls on consecutive days. In the emergency department the patient was found to have left femoral neck fracture. Orthopedic surgery was consulted. Today: The patient continues to be confused. Orthopedic surgery taken the patient for surgical fixation. Patient has no complaints other than pain. Comprehensive review of systems negative unless stated above. I have personally reviewed all current data for this patient.     Physical Exam:  General: Elderly black female, lying in bed, confused, no acute distress  HEENT: NCAT, moist mucous membranes  Skin: No rash noted  Cardio: RRR, normal S1/S2, no rubs, no gallops, no murmurs  Pulm: Non labored respirations on room air, LCAB, no wheezing, no rales, no rhonchi  GI: Soft, Nt, Nd, Nml bowel sounds, no masses noted  Extremity: Left leg in brace  Neuro: Alert, oriented, moving all extremities, no focal deficits noted  Psych: Pleasant, cooperative, normal range of affect    Assessment and Plan:    #Left femoral neck fracture:  -All management per orthopedic surgery    #HTN: Continue home amlodipine, metoprolol  #Depression: Continue home buspirone, fluoxetine  #Hypotoyroidism: Continue home levothyroxine    Inpatient for above    Full code    All VTE prophylaxis per orthopedic surgery

## 2020-05-21 NOTE — ANESTHESIA PROCEDURE NOTES
Spinal Block    Start time: 5/21/2020 4:05 PM  End time: 5/21/2020 4:15 PM  Performed by: Barrett Tilley MD  Authorized by: Barrett Tilley MD     Pre-procedure: Indications: primary anesthetic  Preanesthetic Checklist: patient identified, risks and benefits discussed, anesthesia consent, patient being monitored and timeout performed    Timeout Time: 16:05  Preanesthetic Checklist comment:  Risk of nerve damage discussed.     Spinal Block:   Patient Position:  Seated  Prep Region:  Lumbar  Prep: chlorhexidine and patient draped      Location:  L3-4  Technique:  Single shot    Local Dose (mL):  3    Needle:   Needle Type:  Pencan  Needle Gauge:  25 G  Attempts:  2      Events: CSF confirmed, no blood with aspiration and no paresthesia        Assessment:  Insertion:  Uncomplicated  Patient tolerance:  Patient tolerated the procedure well with no immediate complications

## 2020-05-21 NOTE — PROGRESS NOTES
TRANSFER - OUT REPORT:    Verbal report given to LEATHA Olivas on [employer]  being transferred to Pre-Op for routine progression of care       Report consisted of patients Situation, Background, Assessment and   Recommendations(SBAR). Information from the following report(s) SBAR was reviewed with the receiving nurse. Lines:   Peripheral IV 05/20/20 Left Forearm (Active)   Site Assessment Clean, dry, & intact 5/21/2020  7:38 AM   Phlebitis Assessment 0 5/21/2020  7:38 AM   Infiltration Assessment 0 5/21/2020  7:38 AM   Dressing Status Clean, dry, & intact 5/21/2020  7:38 AM   Dressing Type Tape;Transparent 5/21/2020  7:38 AM   Hub Color/Line Status Blue 5/21/2020  7:38 AM   Action Taken Blood drawn 5/20/2020  5:15 PM        Opportunity for questions and clarification was provided.

## 2020-05-21 NOTE — ANESTHESIA POSTPROCEDURE EVALUATION
Procedure(s): FEMORAL HEAD FRACTURE OPEN REDUCTION INTERNAL FIXATION Left.    general    Anesthesia Post Evaluation      Multimodal analgesia: multimodal analgesia used between 6 hours prior to anesthesia start to PACU discharge  Patient location during evaluation: bedside  Patient participation: complete - patient participated  Level of consciousness: awake and responsive to light touch  Pain management: adequate  Airway patency: patent  Anesthetic complications: no  Cardiovascular status: acceptable, hemodynamically stable, blood pressure returned to baseline and stable  Respiratory status: acceptable, unassisted, spontaneous ventilation and nonlabored ventilation  Hydration status: acceptable  Post anesthesia nausea and vomiting:  controlled      Vitals Value Taken Time   /77 5/21/2020  4:48 PM   Temp 36.2 °C (97.2 °F) 5/21/2020  4:48 PM   Pulse 80 5/21/2020  4:51 PM   Resp 16 5/21/2020  4:48 PM   SpO2 97 % 5/21/2020  4:51 PM   Vitals shown include unvalidated device data.

## 2020-05-21 NOTE — PROGRESS NOTES
's initial visit attempted. Ms. Harish Mclain appeared asleep and I did not wake her. 's remain available for support at patient's convenience.      Raven Amato 68  Board Certified

## 2020-05-22 LAB
ANION GAP SERPL CALC-SCNC: 7 MMOL/L (ref 7–16)
BASOPHILS # BLD: 0 K/UL (ref 0–0.2)
BASOPHILS NFR BLD: 0 % (ref 0–2)
BUN SERPL-MCNC: 18 MG/DL (ref 8–23)
CALCIUM SERPL-MCNC: 8.2 MG/DL (ref 8.3–10.4)
CHLORIDE SERPL-SCNC: 107 MMOL/L (ref 98–107)
CO2 SERPL-SCNC: 25 MMOL/L (ref 21–32)
CREAT SERPL-MCNC: 1.02 MG/DL (ref 0.6–1)
DIFFERENTIAL METHOD BLD: ABNORMAL
EOSINOPHIL # BLD: 0.1 K/UL (ref 0–0.8)
EOSINOPHIL NFR BLD: 2 % (ref 0.5–7.8)
ERYTHROCYTE [DISTWIDTH] IN BLOOD BY AUTOMATED COUNT: 14.7 % (ref 11.9–14.6)
GLUCOSE SERPL-MCNC: 85 MG/DL (ref 65–100)
HCT VFR BLD AUTO: 29.7 % (ref 35.8–46.3)
HGB BLD-MCNC: 9.1 G/DL (ref 11.7–15.4)
IMM GRANULOCYTES # BLD AUTO: 0 K/UL (ref 0–0.5)
IMM GRANULOCYTES NFR BLD AUTO: 0 % (ref 0–5)
LYMPHOCYTES # BLD: 1 K/UL (ref 0.5–4.6)
LYMPHOCYTES NFR BLD: 14 % (ref 13–44)
MCH RBC QN AUTO: 26.8 PG (ref 26.1–32.9)
MCHC RBC AUTO-ENTMCNC: 30.6 G/DL (ref 31.4–35)
MCV RBC AUTO: 87.4 FL (ref 79.6–97.8)
MM INDURATION POC: 0 MM (ref 0–5)
MONOCYTES # BLD: 0.6 K/UL (ref 0.1–1.3)
MONOCYTES NFR BLD: 10 % (ref 4–12)
NEUTS SEG # BLD: 4.9 K/UL (ref 1.7–8.2)
NEUTS SEG NFR BLD: 73 % (ref 43–78)
NRBC # BLD: 0 K/UL (ref 0–0.2)
PLATELET # BLD AUTO: 202 K/UL (ref 150–450)
PMV BLD AUTO: 11.7 FL (ref 9.4–12.3)
POTASSIUM SERPL-SCNC: 4.2 MMOL/L (ref 3.5–5.1)
PPD POC: NEGATIVE NEGATIVE
RBC # BLD AUTO: 3.4 M/UL (ref 4.05–5.2)
SODIUM SERPL-SCNC: 139 MMOL/L (ref 136–145)
WBC # BLD AUTO: 6.7 K/UL (ref 4.3–11.1)

## 2020-05-22 PROCEDURE — 85025 COMPLETE CBC W/AUTO DIFF WBC: CPT

## 2020-05-22 PROCEDURE — 97165 OT EVAL LOW COMPLEX 30 MIN: CPT

## 2020-05-22 PROCEDURE — 36415 COLL VENOUS BLD VENIPUNCTURE: CPT

## 2020-05-22 PROCEDURE — 74011250637 HC RX REV CODE- 250/637: Performed by: ORTHOPAEDIC SURGERY

## 2020-05-22 PROCEDURE — 94760 N-INVAS EAR/PLS OXIMETRY 1: CPT

## 2020-05-22 PROCEDURE — 80048 BASIC METABOLIC PNL TOTAL CA: CPT

## 2020-05-22 PROCEDURE — 97161 PT EVAL LOW COMPLEX 20 MIN: CPT

## 2020-05-22 PROCEDURE — 97162 PT EVAL MOD COMPLEX 30 MIN: CPT

## 2020-05-22 PROCEDURE — 65270000029 HC RM PRIVATE

## 2020-05-22 PROCEDURE — 74011250636 HC RX REV CODE- 250/636: Performed by: ORTHOPAEDIC SURGERY

## 2020-05-22 PROCEDURE — 97530 THERAPEUTIC ACTIVITIES: CPT

## 2020-05-22 PROCEDURE — 74011000258 HC RX REV CODE- 258: Performed by: ORTHOPAEDIC SURGERY

## 2020-05-22 RX ADMIN — Medication 1 TABLET: at 14:34

## 2020-05-22 RX ADMIN — METOPROLOL SUCCINATE 50 MG: 50 TABLET, EXTENDED RELEASE ORAL at 09:02

## 2020-05-22 RX ADMIN — CYPROHEPTADINE HYDROCHLORIDE 4 MG: 4 TABLET ORAL at 09:02

## 2020-05-22 RX ADMIN — FLUOXETINE 40 MG: 20 CAPSULE ORAL at 09:02

## 2020-05-22 RX ADMIN — SODIUM CHLORIDE 1000 MG: 900 INJECTION, SOLUTION INTRAVENOUS at 00:25

## 2020-05-22 RX ADMIN — BUSPIRONE HYDROCHLORIDE 5 MG: 5 TABLET ORAL at 09:02

## 2020-05-22 RX ADMIN — Medication 10 ML: at 14:34

## 2020-05-22 RX ADMIN — Medication 1 TABLET: at 17:35

## 2020-05-22 RX ADMIN — HYDROCODONE BITARTRATE AND ACETAMINOPHEN 1 TABLET: 5; 325 TABLET ORAL at 05:35

## 2020-05-22 RX ADMIN — HYDROCODONE BITARTRATE AND ACETAMINOPHEN 2 TABLET: 5; 325 TABLET ORAL at 22:20

## 2020-05-22 RX ADMIN — Medication 1 TABLET: at 09:02

## 2020-05-22 RX ADMIN — ASPIRIN 325 MG ORAL TABLET 325 MG: 325 PILL ORAL at 09:02

## 2020-05-22 RX ADMIN — LEVOTHYROXINE SODIUM 50 MCG: 0.05 TABLET ORAL at 05:35

## 2020-05-22 RX ADMIN — POTASSIUM CHLORIDE 20 MEQ: 20 TABLET, EXTENDED RELEASE ORAL at 09:02

## 2020-05-22 RX ADMIN — SODIUM CHLORIDE 1000 MG: 900 INJECTION, SOLUTION INTRAVENOUS at 09:03

## 2020-05-22 RX ADMIN — AMLODIPINE BESYLATE 5 MG: 5 TABLET ORAL at 05:34

## 2020-05-22 NOTE — PROGRESS NOTES
Problem: Mobility Impaired (Adult and Pediatric)  Goal: *Acute Goals and Plan of Care (Insert Text)  Description: LTG:  (1.)Ms. Justina Graves will move from supine to sit and sit to supine, scoot up and down and roll side to side with MINIMAL ASSIST within 7 treatment day(s). (2.)Ms. Justina Graves will transfer from bed to chair and chair to bed with MINIMAL ASSIST using the least restrictive device within 7 treatment day(s). (3.)Ms. Justina Graves will ambulate with MINIMAL ASSIST for 10 feet with the least restrictive device within 7 treatment day(s). (4.)Ms. Justina Graves will perform exercises per HEP for 15+ minutes to improve strength and mobility within 7 days. ________________________________________________________________________________________________   Outcome: Progressing Towards Goal     PHYSICAL THERAPY: Initial Assessment and AM 2020  INPATIENT: PT Visit Days : 1  Payor: Jeb Gitelman / Plan: [facility] MEDICARE CHOICE PPO/PFFS / Product Type: Managed Care Medicare /       NAME/AGE/GENDER: Christina Soares is a 80 y.o. female   PRIMARY DIAGNOSIS: Closed left hip fracture (HCC) [S72.002A]  Constipation [K59.00] Closed left hip fracture (HCC) Closed left hip fracture (HCC)  Procedure(s) (LRB):  FEMORAL HEAD FRACTURE OPEN REDUCTION INTERNAL FIXATION Left (Left)  1 Day Post-Op  ICD-10: Treatment Diagnosis:    · Generalized Muscle Weakness (M62.81)  · Difficulty in walking, Not elsewhere classified (R26.2)  · Other abnormalities of gait and mobility (R26.89)  · History of falling (Z91.81)   Precaution/Allergies:  Patient has no known allergies. ASSESSMENT:     Ms. Justina Graves is a pleasantly confused 80year old female admitted from home with left hip fracture and is seen s/p ORIF. WBAT per ortho orders. Per chart review, pt lives with daughter who is her caregiver. She is typically ambulatory for household distances slowly without DME use, does have cane available.  Pt presents sitting up in bed pleasantly confused, needs frequent reminders for orientation and situation. Mod assist and max additional time for transfer to sitting. Fair seated balance noted and needs help with scooting/repositioning to prepare for transfers. Multiple unsuccessful attempts to stand with assist of 1 and gait belt with heavy cueing for transfer technique, sequencing, and body mechanics. Mod assist of 2 to stand from edge of bed with walker. Poor standing balance with heavy support and assistance in standing. Ambulates with very slow, antalgic pace for 3 ft with mod assist of 2 and heavy cueing for safety, sequencing and for slow descent into chair. Positioned comfortably in chair with alarm on, legs elevated, and needs in reach. Pt limited by altered mental status, pain, and weakness. Vasquez Noyola is demonstrating expected post op deficits with mobility, strength, balance, transfers, and activity tolerance and will benefit from continued acute PT to address deficits/maixmize independence with mobility. Per chart review, family is hoping to take pt home with [facility] PT however currently unsafe with mobility needs heavy assist of 2 so recommending rehab at [facility]. This section established at most recent assessment   PROBLEM LIST (Impairments causing functional limitations):  1. Decreased Strength  2. Decreased Transfer Abilities  3. Decreased Ambulation Ability/Technique  4. Decreased Balance  5. Increased Pain  6. Decreased Activity Tolerance  7. Decreased Pacing Skills  8. Decreased Flexibility/Joint Mobility  9. Decreased Knowledge of Precautions  10. Decreased Skin Integrity/Hygeine  11. Decreased Cognition   INTERVENTIONS PLANNED: (Benefits and precautions of physical therapy have been discussed with the patient.)  1. Balance Exercise  2. Bed Mobility  3. Gait Training  4. Home Exercise Program (HEP)  5. Therapeutic Activites  6. Therapeutic Exercise/Strengthening  7.  Transfer Training     TREATMENT PLAN: Frequency/Duration: 3 times a week for duration of hospital stay  Rehabilitation Potential For Stated Goals: Good     REHAB RECOMMENDATIONS (at time of discharge pending progress):    Placement: It is my opinion, based on this patient's performance to date, that Ms. Kane Parisi may benefit from intensive therapy at a [address] [place] [address] after discharge due to the functional deficits listed above that are likely to improve with skilled rehabilitation and concerns that he/she may be unsafe to be unsupervised at home due to weakness, fall risk, need for heavy assistance. Equipment:    tbd pending progress              HISTORY:   History of Present Injury/Illness (Reason for Referral):  Per H&P, \"80year-old  can female patient with a known history of hypertension, dementia, hypothyroidism and osteoporosis. Patient was seen yesterday in emergency room for history of fall. She had 1 more fall last night and brought to the emergency room again for further evaluation . Patient has been complaining of left hip pain.     Apart from fall and left hip pain of the 10 directions apart from the one mentioned above patient other review of systems were negative noncontributory.     WBC of 11.5, creatinine of 1.28, GFR of 50. Left hip x-ray-  Impression: Question acute nondisplaced transcervical fracture of the left  femoral neck.  MRI of the left hip without contrast may be more sensitive in  Evaluation.     Patient will be transferred to [facility] for further management of left hip fracture and constipation(based on the x-ray KUB done yesterday). \"  Past Medical History/Comorbidities:   Ms. Kane Parisi  has a past medical history of Anxiety, Hematuria, microscopic, Hypercholesteremia, Hypertension, Hypothyroidism, IBS (irritable bowel syndrome), Osteoporosis, Sciatica, Short-term memory loss, and Sleep apnea. Ms. Kane Parisi  has a past surgical history that includes hx hysterectomy; hx svt ablation; and hx vitrectomy (Bilateral).   Social History/Living Environment: Home Environment: Private residence  Living Alone: No  Support Systems: Family member(s), Child(marv)  Patient Expects to be Discharged to[de-identified] Unknown  Current DME Used/Available at Home: Walker, rolling  Prior Level of Function/Work/Activity:  Lives with daughter who is her caregiver and assists with ADLs, mobility. Per chart review, pt has walker but does not typically use. Ambulates slowly in hallway without DME use. Number of Personal Factors/Comorbidities that affect the Plan of Care: 3+: HIGH COMPLEXITY   EXAMINATION:   Most Recent Physical Functioning:   Gross Assessment:  AROM: Generally decreased, functional  Strength: Generally decreased, functional  Coordination: Generally decreased, functional               Posture:  Posture (WDL): Exceptions to WDL  Posture Assessment: Forward head, Rounded shoulders, Trunk flexion  Balance:  Sitting: Impaired  Sitting - Static: Fair (occasional)  Sitting - Dynamic: Fair (occasional)  Standing: Impaired  Standing - Static: Poor  Standing - Dynamic : Poor Bed Mobility:  Supine to Sit: Moderate assistance; Additional time  Scooting: Moderate assistance; Additional time  Wheelchair Mobility:     Transfers:  Sit to Stand: Moderate assistance;Assist x2  Stand to Sit: Moderate assistance  Bed to Chair: Moderate assistance;Assist x2; Additional time  Interventions: Verbal cues; Visual cues; Safety awareness training; Tactile cues;Manual cues  Duration: 15 Minutes  Gait:  Right Side Weight Bearing: Full  Left Side Weight Bearing: As tolerated  Base of Support: Shift to right  Speed/Manasa: Pace decreased (<100 feet/min); Slow  Step Length: Left shortened;Right shortened  Gait Abnormalities: Antalgic; Step to gait  Distance (ft): 3 Feet (ft)  Assistive Device: Walker, rolling;Gait belt  Ambulation - Level of Assistance: Moderate assistance;Assist x2; Additional time  Interventions: Verbal cues; Visual/Demos; Safety awareness training; Tactile cues;Manual cues      Body Structures Involved:  1. Eyes and Ears  2. Bones  3. Joints  4. Muscles Body Functions Affected:  1. Mental  2. Sensory/Pain  3. Neuromusculoskeletal  4. Movement Related Activities and Participation Affected:  1. General Tasks and Demands  2. Mobility  3. Domestic Life  4. Community, Social and [place]   Number of elements that affect the Plan of Care: 4+: HIGH COMPLEXITY   CLINICAL PRESENTATION:   Presentation: Evolving clinical presentation with changing clinical characteristics: MODERATE COMPLEXITY   CLINICAL DECISION MAKIN [facility] Inpatient Short Form  How much difficulty does the patient currently have. .. Unable A Lot A Little None   1. Turning over in bed (including adjusting bedclothes, sheets and blankets)? [] 1   [x] 2   [] 3   [] 4   2. Sitting down on and standing up from a chair with arms ( e.g., wheelchair, bedside commode, etc.)   [] 1   [x] 2   [] 3   [] 4   3. Moving from lying on back to sitting on the side of the bed? [] 1   [x] 2   [] 3   [] 4   How much help from another person does the patient currently need. .. Total A Lot A Little None   4. Moving to and from a bed to a chair (including a wheelchair)? [] 1   [x] 2   [] 3   [] 4   5. Need to walk in hospital room? [x] 1   [] 2   [] 3   [] 4   6. Climbing 3-5 steps with a railing? [x] 1   [] 2   [] 3   [] 4   © , Trustees of [facility] [employer], under license to [employer]. All rights reserved      Score:  Initial: 10 Most Recent: X (Date: -- )    Interpretation of Tool:  Represents activities that are increasingly more difficult (i.e. Bed mobility, Transfers, Gait). Medical Necessity:     · Patient demonstrates good rehab potential due to higher previous functional level.   Reason for Services/Other Comments:  · Patient continues to demonstrate capacity to improve strength, mobility, balance, transfers, and activity tolerance which will increase independence, decrease amount of assistance required from caregiver and increase safety. Use of outcome tool(s) and clinical judgement create a POC that gives a: Questionable prediction of patient's progress: MODERATE COMPLEXITY            TREATMENT:   (In addition to Assessment/Re-Assessment sessions the following treatments were rendered)   Pre-treatment Symptoms/Complaints:  \"thank you\"  Pain: Initial:   Pain Intensity 1: 0(at rest; pain with mobility)  Post Session:  0/10     Therapeutic Activity: (  15 Minutes ):  Therapeutic activities including Bed transfers, Chair transfers and Ambulation on level ground to improve mobility, strength, balance, coordination and activity tolerance. Required moderate assist of 2 and heavy Verbal cues; Visual/Demos; Safety awareness training; Tactile cues;Manual cues to promote static and dynamic balance in standing and promote motor control of bilateral, lower extremity(s). Braces/Orthotics/Lines/Etc:   · IV  · O2 Device: Room air  Treatment/Session Assessment:    · Response to Treatment: Mod-max A x2 limited by pain, weakness, cognition    · Interdisciplinary Collaboration:   o Physical Therapist  o Registered Nurse  · After treatment position/precautions:   o Up in chair  o Bed alarm/tab alert on  o Bed/Chair-wheels locked  o Bed in low position  o Call light within reach  o RN notified   · Compliance with Program/Exercises: Will assess as treatment progresses  · Recommendations/Intent for next treatment session: \"Next visit will focus on advancements to more challenging activities and reduction in assistance provided\".   Total Treatment Duration:  PT Patient Time In/Time Out  Time In: 0828  Time Out: 0900    Yazan Morgan DPT

## 2020-05-22 NOTE — PROGRESS NOTES
May 22, 2020         Post Op day: 1 Day Post-Op Procedure(s) (LRB):  FEMORAL HEAD FRACTURE OPEN REDUCTION INTERNAL FIXATION Left (Left)      Admit Date: 2020  Admit Diagnosis: Closed left hip fracture ([facility]) [S72.002A]  Constipation [K59.00]       Principle Problem: Cl[facility] hip [facility] ([facility] Subjective: Doing well, No complaints, No SOB, No Chest Pain, No Nausea or Vomiting     Objective:   Vital Signs are Stable, No Acute Distress, Alert,  Dressing is Dry,  Neurovascular exam is normal.     Assessment / Plan :  Patient Active Problem List   Diagnosis Code    Closed left hip fracture ([facility] S72.002A    Essential hypertension I10    Acquired hypothyroidism E03.9    Depression F32.9    Dementia ([facility] F03.90    CKD (chronic kidney disease) stage 3, GFR 30-59 ml/min ([facility]) N18.3    Constipation K59.00      Patient Vitals for the past 8 hrs:   BP Temp Pulse Resp SpO2   20 0743 136/60 98.3 °F (36.8 °C) 75 18 92 %   20 0402 137/74 98 °F (36.7 °C) 76 16 96 %    Temp (24hrs), Av.6 °F (37 °C), Min:97.2 °F (36.2 °C), Max:100.2 °F (37.9 °C)    There is no height or weight on file to calculate BMI.     Lab Results   Component Value Date/Time    HGB 9.1 (L) 2020 04:54 AM          Medical Mgmt per hospitalist  Anticoagulation plan: ASA 325mg daily   Continue PT  Fall Precuations  DC disp: rehab placement        Signed By: JODIE Milligan  2020,  8:14 AM

## 2020-05-22 NOTE — PROGRESS NOTES
History of Present Illness/Hospital Course:  80-year-old female presenting to the emergency department on 5/20/20 after two consecutive falls on consecutive days. In the emergency department the patient was found to have left femoral neck fracture. Orthopedic surgery was consulted. Today: Patient had uncomplicated surgical fixation on 5/21/2020. Patient has no complaints today. Comprehensive review of systems negative unless stated above. I have personally reviewed all current data for this patient. Physical Exam:  General: Elderly black female, lying in bed, confused, no acute distress  HEENT: NCAT, moist mucous membranes  Skin: No rash noted  Cardio: RRR, normal S1/S2, no rubs, no gallops, no murmurs  Pulm: Non labored respirations on room air, LCAB, no wheezing, no rales, no rhonchi  GI: Soft, Nt, Nd, Nml bowel sounds, no masses noted  Extremity: Left leg in brace  Neuro: Alert, oriented, moving all extremities, no focal deficits noted  Psych: Pleasant, cooperative, normal range of affect    Assessment and Plan:    #Left femoral neck fracture:  -All management per orthopedic surgery    #HTN: Continue home amlodipine, metoprolol  #Depression: Continue home buspirone, fluoxetine  #Hypotoyroidism: Continue home levothyroxine    Inpatient for above, physical therapy recommend discharge to skilled nursing facility for short-term rehab but the family is refusing. Patient medically stable for discharge plan for discharge on 5/23/2020.     Full code    All VTE prophylaxis per orthopedic surgery

## 2020-05-22 NOTE — PROGRESS NOTES
Problem: Self Care Deficits Care Plan (Adult)  Goal: *Acute Goals and Plan of Care (Insert Text)  Description: 1. Patient will complete lower body bathing and dressing with minimal assistance and adaptive equipment as needed. 2. Patient will complete toileting with minimal assistance. 3. Patient will tolerate 30 minutes of OT treatment with 2-3 rest breaks to increase activity tolerance for ADLs. 4. Patient will complete functional transfers with minimal assistance and adaptive equipment as needed. 5. Patient will complete functional mobility for household distances with minimal assistance and appropriate safety awareness. 5. Patient will tolerate standing sink side with minimal assistance while participating in grooming tasks. Timeframe: 7 visits      Outcome: Progressing Towards Goal     OCCUPATIONAL THERAPY: Initial Assessment, Daily Note, and AM 5/22/2020  INPATIENT: OT Visit Days: 1  Payor: Gayle Javier / Plan: [facility] [employer] MEDICARE CHOICE PPO/PFFS / Product Type: [employer] Medicare /    L LE WBAT   NAME/AGE/GENDER: Gina Mclean is a 80 y.o. female   PRIMARY DIAGNOSIS:  Closed left hip fracture (HCC) [S72.002A]  Constipation [K59.00] Closed left hip fracture (HCC) Closed left hip fracture (HCC)  Procedure(s) (LRB):  FEMORAL HEAD FRACTURE OPEN REDUCTION INTERNAL FIXATION Left (Left)  1 Day Post-Op  ICD-10: Treatment Diagnosis:    Pain in left hip (M25.552)  Stiffness of Left Hip, Not elsewhere classified (M25.652)  Generalized Muscle Weakness (M62.81)  Other lack of cordination (R27.8)  History of falling (Z91.81)   Precautions/Allergies:     Patient has no known allergies. ASSESSMENT:     Ms. Laurie Villafuerte presents to the hospital after falling and sustaining L hip fx. Pt is s/p ORIF L hip and is WBAT in the LLE. Pt is alert and sitting up in the chair upon arrival. Pt is extremely pleasant and cooperative. Pt is limited with movement due to pain in her L hip.  Pt is oriented to person and time. Pt does appear to have some memory issues with patient tending to repeat herself throughout the assessment. Pt appears to have functional upper body strength for assisting with scooting to the edge of the chair with minimal assistance. Pt had some difficulty with sit to stand needing moderate-maximal assistance x 2. Pt required some additional time to adjust posture and did better once she was able to push through her UEs on the walker. Pt was able to take steps from the chair to the sink but moved very slowly and required additional time to complete. Pt reports fatigue and chair was brought behind her and she was set-up in the recliner with all needs at her side. Pt appears to be functioning below her baseline and will benefit from OT services to address stated goals and plan of care. This section established at most recent assessment   PROBLEM LIST (Impairments causing functional limitations):  Decreased Strength  Decreased ADL/Functional Activities  Decreased Transfer Abilities  Decreased Ambulation Ability/Technique  Decreased Balance  Increased Pain  Decreased Activity Tolerance  Decreased Flexibility/Joint Mobility  Decreased [place] with Home Exercise Program  Decreased Cognition   INTERVENTIONS PLANNED: (Benefits and precautions of occupational therapy have been discussed with the patient.)  Activities of daily living training  Adaptive equipment training  Balance training  Clothing management  Cognitive training  Donning&doffing training  Neuromuscular re-eduation  Therapeutic activity  Therapeutic exercise     TREATMENT PLAN: Frequency/Duration: Follow patient 6 times per week to address above goals. Rehabilitation Potential For Stated Goals: Good     REHAB RECOMMENDATIONS (at time of discharge pending progress):    Placement: It is my opinion, based on this patient's performance to date, that Ms. Mary Kay Schaefer may benefit from intensive therapy at a [address] [place] [address] after discharge due to the functional deficits listed above that are likely to improve with skilled rehabilitation and concerns that he/she may be unsafe to be unsupervised at home due to risk for falls and further functional decline. Equipment:   TBD               OCCUPATIONAL PROFILE AND HISTORY:   History of Present Injury/Illness (Reason for Referral):  See H&P  Past Medical History/Comorbidities:   Ms. Todd Diaz  has a past medical history of Anxiety, Hematuria, microscopic, Hypercholesteremia, Hypertension, Hypothyroidism, IBS (irritable bowel syndrome), Osteoporosis, Sciatica, Short-term memory loss, and Sleep apnea. Ms. Todd Diaz  has a past surgical history that includes hx hysterectomy; hx svt ablation; and hx vitrectomy (Bilateral). Social History/Living Environment:   Home Environment: Private residence  Living Alone: No  Support Systems: Family member(s), Child(marv)  Patient Expects to be Discharged to[de-identified] Unknown  Current DME Used/Available at Home: Shower chair, Walker, rolling  Tub or Shower Type: Shower  Prior Level of Function/Work/Activity:  Pt lives at home with her daughter that assists with her care. Pt reports she uses a rolling walker on occasion. Pt completes her own toileting needs and states she can dress herself. Pt completes a sponge bath or her daughter assist her into the shower. Personal Factors:          Age:  80 y.o. Past/Current Experience:  recent fall and s/p L Hip ORIF           Number of Personal Factors/Comorbidities that affect the Plan of Care: Expanded review of therapy/medical records (1-2):  MODERATE COMPLEXITY   ASSESSMENT OF OCCUPATIONAL PERFORMANCE[de-identified]   Activities of Daily Living:   Basic ADLs (From Assessment) Complex ADLs (From Assessment)   Feeding: Stand-by assistance  Oral Facial Hygiene/Grooming: Minimum assistance  Bathing:  Moderate assistance  Upper Body Dressing: Minimum assistance  Lower Body Dressing: Maximum assistance  Toileting: Maximum assistance Instrumental ADL  Meal Preparation: Total assistance  Homemaking: Total assistance   Grooming/Bathing/Dressing Activities of Daily Living     Cognitive Retraining  Safety/Judgement: Fall prevention                       Bed/Mat Mobility  Supine to Sit: Moderate assistance; Additional time  Sit to Stand: Moderate assistance;Assist x2  Stand to Sit: Moderate assistance;Assist x2  Bed to Chair: Moderate assistance;Assist x2; Additional time  Scooting: Minimum assistance     Most Recent Physical Functioning:   Gross Assessment:  AROM: Within functional limits  Strength: Generally decreased, functional  Coordination: Generally decreased, functional               Posture:  Posture (WDL): Exceptions to WDL  Posture Assessment: Forward head, Rounded shoulders, Trunk flexion  Balance:  Sitting: Impaired  Sitting - Static: Fair (occasional)  Sitting - Dynamic: Fair (occasional)  Standing: Impaired  Standing - Static: Constant support  Standing - Dynamic : Poor Bed Mobility:  Supine to Sit: Moderate assistance; Additional time  Scooting: Minimum assistance  Wheelchair Mobility:     Transfers:  Sit to Stand: Moderate assistance;Assist x2  Stand to Sit: Moderate assistance;Assist x2  Bed to Chair: Moderate assistance;Assist x2; Additional time  Interventions: Verbal cues; Visual cues; Safety awareness training; Tactile cues;Manual cues  Duration: 15 Minutes            Patient Vitals for the past 6 hrs:   BP BP Patient Position SpO2 Pulse   05/22/20 0743 136/60 At rest 92 % 75   05/22/20 0900 -- -- 90 % --   05/22/20 1131 128/62 Sitting 92 % 70       Mental Status  Neurologic State: Alert  Orientation Level: Disoriented to place, Oriented to person, Oriented to time  Cognition: Follows commands, Memory loss  Perseveration: No perseveration noted  Safety/Judgement: Fall prevention                          Physical Skills Involved:  Range of Motion  Balance  Strength  Activity Tolerance  Pain (acute) Cognitive Skills Affected (resulting in the inability to perform in a timely and safe manner):  Executive Function  Short Term Recall Psychosocial Skills Affected:  Habits/Routines  Environmental Adaptation  Self-Awareness   Number of elements that affect the Plan of Care: 5+:  HIGH COMPLEXITY   CLINICAL DECISION MAKIN[address] [place] [address] AM-PAC 6 Clicks   Daily Activity Inpatient Short Form  How much help from another person does the patient currently need. .. Total A Lot A Little None   1. Putting on and taking off regular lower body clothing? [] 1   [x] 2   [] 3   [] 4   2. Bathing (including washing, rinsing, drying)? [] 1   [x] 2   [] 3   [] 4   3. Toileting, which includes using toilet, bedpan or urinal?   [] 1   [x] 2   [] 3   [] 4   4. Putting on and taking off regular upper body clothing? [] 1   [] 2   [x] 3   [] 4   5. Taking care of personal grooming such as brushing teeth? [] 1   [] 2   [x] 3   [] 4   6. Eating meals? [] 1   [] 2   [x] 3   [] 4   © , Trustees of [address] [place] [address], under license to [employer]. All rights reserved      Score:  Initial: 15 Most Recent: X (Date: -- )    Interpretation of Tool:  Represents activities that are increasingly more difficult (i.e. Bed mobility, Transfers, Gait). Medical Necessity:     Patient demonstrates   good and excellent   rehab potential due to higher previous functional level. Reason for Services/Other Comments:  Patient continues to require skilled intervention due to   Decreased independence with ADL/functional transfers.     .   Use of outcome tool(s) and clinical judgement create a POC that gives a: LOW COMPLEXITY         TREATMENT:   (In addition to Assessment/Re-Assessment sessions the following treatments were rendered)     Pre-treatment Symptoms/Complaints:    Pain: Initial:   Pain Intensity 1: (unable to rate)  Pain Location 1: Hip  Pain Orientation 1: Left  Pain Intervention(s) 1: Repositioned  Post Session:  same     Therapeutic Activity: (  15 Minutes ):  Therapeutic activities including Chair transfers and Ambulation on level ground to improve mobility, strength, balance, and coordination. Required maximal Verbal cues; Visual/Demos; Safety awareness training; Tactile cues;Manual cues to promote static and dynamic balance in standing. N/a     Braces/Orthotics/Lines/Etc:   IV  O2 Device: Room air  Treatment/Session Assessment:    Response to Treatment:  Pt tolerated it well but with reported pain in L hip post-op. Interdisciplinary Collaboration:   Occupational Therapist  Registered Nurse  Rehabilitation Attendant  After treatment position/precautions:   Up in chair  Bed alarm/tab alert on  Bed/Chair-wheels locked  Call light within reach  RN notified   Compliance with Program/Exercises: Will assess as treatment progresses. Recommendations/Intent for next treatment session: \"Next visit will focus on advancements to more challenging activities and reduction in assistance provided\".   Total Treatment Duration:  OT Patient Time In/Time Out  Time In: 1043  Time Out: Jace 145, OT

## 2020-05-22 NOTE — PROGRESS NOTES
Problem: Mobility Impaired (Adult and Pediatric)  Goal: *Acute Goals and Plan of Care (Insert Text)  Description: LTG:  (1.)Ms. Xochitl Nguyen will move from supine to sit and sit to supine, scoot up and down and roll side to side with MINIMAL ASSIST within 7 treatment day(s). (2.)Ms. Xochitl Nguyen will transfer from bed to chair and chair to bed with MINIMAL ASSIST using the least restrictive device within 7 treatment day(s). (3.)Ms. Xochitl Nguyen will ambulate with MINIMAL ASSIST for 10 feet with the least restrictive device within 7 treatment day(s). (4.)Ms. Xochitl Nguyen will perform exercises per HEP for 15+ minutes to improve strength and mobility within 7 days. ________________________________________________________________________________________________   Outcome: Progressing Towards Goal     PHYSICAL THERAPY: Daily Note and PM 5/22/2020  INPATIENT: PT Visit Days : 1  Payor: Brittany David / Plan: [id number] PPO/PFFS / Product Type: [employer] /       NAME/AGE/GENDER: Robert Moss is a 80 y.o. female   PRIMARY DIAGNOSIS: Closed left hip fracture (HCC) [S72.002A]  Constipation [K59.00] Closed left hip fracture (HCC) Closed left hip fracture (HCC)  Procedure(s) (LRB):  FEMORAL HEAD FRACTURE OPEN REDUCTION INTERNAL FIXATION Left (Left)  1 Day Post-Op  ICD-10: Treatment Diagnosis:    · Generalized Muscle Weakness (M62.81)  · Difficulty in walking, Not elsewhere classified (R26.2)  · Other abnormalities of gait and mobility (R26.89)  · History of falling (Z91.81)   Precaution/Allergies:  Patient has no known allergies. ASSESSMENT:     Ms. Xochitl Nguyen is a pleasantly confused 80year old female admitted from home with left hip fracture and is seen s/p ORIF. WBAT per ortho orders. Per chart review, pt lives with daughter who is her caregiver. She is typically ambulatory for household distances slowly without DME use, does have cane available.  Pt presents sitting up in bed pleasantly confused, needs frequent reminders for orientation and situation. Mod assist and max additional time for transfer to sitting. Fair seated balance noted and needs help with scooting/repositioning to prepare for transfers. Multiple unsuccessful attempts to stand with assist of 1 and gait belt with heavy cueing for transfer technique, sequencing, and body mechanics. Mod assist of 2 to stand from edge of bed with walker. Poor standing balance with heavy support and assistance in standing. Ambulates with very slow, antalgic pace for 3 ft with mod assist of 2 and heavy cueing for safety, sequencing and for slow descent into chair. Positioned comfortably in chair with alarm on, legs elevated, and needs in reach. Pt limited by altered mental status, pain, and weakness. Vasquez Noyola is demonstrating expected post op deficits with mobility, strength, balance, transfers, and activity tolerance and will benefit from continued acute PT to address deficits/maixmize independence with mobility. Per chart review, family is hoping to take pt home with [facility] however currently unsafe with mobility needs heavy assist of 2 so recommending rehab at [facility]. PM- pt needs heavy max assist of 2 and additional time for sit-stand transfer with heavy cueing for use of UEs, walker management, positioning, transfer technique. Pt with expected post op pain in left hip and needing heavy assistance with ambulation 3 ft from bed to chair. Mod-max assist of 2 to return to supine and reposition. Slow progress this PM- still limited by confusion, pain, and weakness. Rehab at [facility]. This section established at most recent assessment   PROBLEM LIST (Impairments causing functional limitations):  1. Decreased Strength  2. Decreased Transfer Abilities  3. Decreased Ambulation Ability/Technique  4. Decreased Balance  5. Increased Pain  6. Decreased Activity Tolerance  7. Decreased Pacing Skills  8. Decreased Flexibility/Joint Mobility  9.  Decreased Knowledge of Precautions  10. Decreased Skin Integrity/Hygeine  11. Decreased Cognition   INTERVENTIONS PLANNED: (Benefits and precautions of physical therapy have been discussed with the patient.)  1. Balance Exercise  2. Bed Mobility  3. Gait Training  4. Home Exercise Program (HEP)  5. Therapeutic Activites  6. Therapeutic Exercise/Strengthening  7. Transfer Training     TREATMENT PLAN: Frequency/Duration: 3 times a week for duration of hospital stay  Rehabilitation Potential For Stated Goals: Good     REHAB RECOMMENDATIONS (at time of discharge pending progress):    Placement: It is my opinion, based on this patient's performance to date, that Ms. Kayla Freitas may benefit from intensive therapy at a [address] [place] [address] after discharge due to the functional deficits listed above that are likely to improve with skilled rehabilitation and concerns that he/she may be unsafe to be unsupervised at home due to weakness, fall risk, need for heavy assistance. Equipment:    tbd pending progress              HISTORY:   History of Present Injury/Illness (Reason for Referral):  Per H&P, \"80year-old  can female patient with a known history of hypertension, dementia, hypothyroidism and osteoporosis. Patient was seen yesterday in emergency room for history of fall. She had 1 more fall last night and brought to the emergency room again for further evaluation . Patient has been complaining of left hip pain.     Apart from fall and left hip pain of the 10 directions apart from the one mentioned above patient other review of systems were negative noncontributory.     WBC of 11.5, creatinine of 1.28, GFR of 50.   Left hip x-ray-  Impression: Question acute nondisplaced transcervical fracture of the left  femoral neck.  MRI of the left hip without contrast may be more sensitive in  Evaluation.     Patient will be transferred to [facility] for further management of left hip fracture and constipation(based on the x-ray KUB done yesterday). \"  Past Medical History/Comorbidities:   Ms. oCpe Seen  has a past medical history of Anxiety, Hematuria, microscopic, Hypercholesteremia, Hypertension, Hypothyroidism, IBS (irritable bowel syndrome), Osteoporosis, Sciatica, Short-term memory loss, and Sleep apnea. Ms. Cope Seen  has a past surgical history that includes hx hysterectomy; hx svt ablation; and hx vitrectomy (Bilateral). Social History/Living Environment:   Home Environment: Private residence  Living Alone: No  Support Systems: Family member(s), Child(marv)  Patient Expects to be Discharged to[de-identified] Unknown  Current DME Used/Available at Home: Shower chair, Walker, rolling  Tub or Shower Type: Shower  Prior Level of Function/Work/Activity:  Lives with daughter who is her caregiver and assists with ADLs, mobility. Per chart review, pt has walker but does not typically use. Ambulates slowly in hallway without DME use. Number of Personal Factors/Comorbidities that affect the Plan of Care: 3+: HIGH COMPLEXITY   EXAMINATION:   Most Recent Physical Functioning:   Gross Assessment:  AROM: Generally decreased, functional  Strength: Generally decreased, functional  Coordination: Generally decreased, functional               Posture:  Posture (WDL): Exceptions to WDL  Posture Assessment: Forward head, Rounded shoulders, Trunk flexion  Balance:  Sitting: Impaired  Sitting - Static: Fair (occasional)  Sitting - Dynamic: Fair (occasional)  Standing: Impaired  Standing - Static: Poor  Standing - Dynamic : Poor Bed Mobility:  Supine to Sit: Moderate assistance; Additional time  Sit to Supine: Moderate assistance;Maximum assistance;Assist x2  Scooting: Moderate assistance  Wheelchair Mobility:     Transfers:  Sit to Stand: Maximum assistance;Assist x2  Stand to Sit: Moderate assistance;Assist x2  Bed to Chair: Moderate assistance;Assist x2; Additional time  Interventions: Verbal cues; Visual cues; Safety awareness training; Tactile cues;Manual cues  Duration: 17 Minutes  Gait:  Right Side Weight Bearing: Full  Left Side Weight Bearing: As tolerated  Base of Support: Shift to right  Speed/Manasa: Pace decreased (<100 feet/min)  Step Length: Left shortened;Right shortened  Gait Abnormalities: Antalgic; Step to gait  Distance (ft): 3 Feet (ft)  Assistive Device: Walker, rolling;Gait belt  Ambulation - Level of Assistance: Moderate assistance;Assist x2; Additional time  Interventions: Verbal cues; Visual/Demos; Safety awareness training; Tactile cues;Manual cues      Body Structures Involved:  1. Eyes and Ears  2. Bones  3. Joints  4. Muscles Body Functions Affected:  1. Mental  2. Sensory/Pain  3. Neuromusculoskeletal  4. Movement Related Activities and Participation Affected:  1. General Tasks and Demands  2. Mobility  3. Domestic Life  4. Community, Social and [place]   Number of elements that affect the Plan of Care: 4+: HIGH COMPLEXITY   CLINICAL PRESENTATION:   Presentation: Evolving clinical presentation with changing clinical characteristics: MODERATE COMPLEXITY   CLINICAL DECISION MAKIN [facility] Short Form  How much difficulty does the patient currently have. .. Unable A Lot A Little None   1. Turning over in bed (including adjusting bedclothes, sheets and blankets)? [] 1   [x] 2   [] 3   [] 4   2. Sitting down on and standing up from a chair with arms ( e.g., wheelchair, bedside commode, etc.)   [] 1   [x] 2   [] 3   [] 4   3. Moving from lying on back to sitting on the side of the bed? [] 1   [x] 2   [] 3   [] 4   How much help from another person does the patient currently need. .. Total A Lot A Little None   4. Moving to and from a bed to a chair (including a wheelchair)? [] 1   [x] 2   [] 3   [] 4   5. Need to walk in hospital room? [x] 1   [] 2   [] 3   [] 4   6. Climbing 3-5 steps with a railing?    [x] 1   [] 2   [] 3   [] 4   © , Trustees of [address] [place] [address] [address] [id number], under license to [employer]. All rights reserved      Score:  Initial: 10 Most Recent: X (Date: -- )    Interpretation of Tool:  Represents activities that are increasingly more difficult (i.e. Bed mobility, Transfers, Gait). Medical Necessity:     · Patient demonstrates good rehab potential due to higher previous functional level. Reason for Services/Other Comments:  · Patient continues to demonstrate capacity to improve strength, mobility, balance, transfers, and activity tolerance which will increase independence, decrease amount of assistance required from caregiver and increase safety. Use of outcome tool(s) and clinical judgement create a POC that gives a: Questionable prediction of patient's progress: MODERATE COMPLEXITY            TREATMENT:   (In addition to Assessment/Re-Assessment sessions the following treatments were rendered)   Pre-treatment Symptoms/Complaints:  \"thank you\"  Pain: Initial:   Pain Intensity 1: 0(at rest; pain with mobility)  Post Session:  0/10     Therapeutic Activity: (  17 Minutes ):  Therapeutic activities including Bed mobility, Chair transfers and Ambulation on level ground to improve mobility, strength, balance, coordination and activity tolerance. Required moderate-maximal assist of 2 and heavy Verbal cues; Visual/Demos; Safety awareness training; Tactile cues;Manual cues to promote static and dynamic balance in standing and promote motor control of bilateral, lower extremity(s). Braces/Orthotics/Lines/Etc:   · IV  · O2 Device: Room air  Treatment/Session Assessment:    · Response to Treatment:  Pt performs mobility with mod-max A x2  · Interdisciplinary Collaboration:   o Physical Therapist  o Registered Nurse  · After treatment position/precautions:   o Supine in bed  o Bed alarm/tab alert on  o Bed/Chair-wheels locked  o Bed in low position  o Call light within reach  o RN notified   · Compliance with Program/Exercises:  Will assess as treatment progresses  · Recommendations/Intent for next treatment session: \"Next visit will focus on advancements to more challenging activities and reduction in assistance provided\".   Total Treatment Duration:  PT Patient Time In/Time Out  Time In: 1300  Time Out: 81 Megan James DPT

## 2020-05-23 LAB
ANION GAP SERPL CALC-SCNC: 8 MMOL/L (ref 7–16)
BASOPHILS # BLD: 0 K/UL (ref 0–0.2)
BASOPHILS NFR BLD: 1 % (ref 0–2)
BUN SERPL-MCNC: 16 MG/DL (ref 8–23)
CALCIUM SERPL-MCNC: 8.6 MG/DL (ref 8.3–10.4)
CHLORIDE SERPL-SCNC: 106 MMOL/L (ref 98–107)
CO2 SERPL-SCNC: 26 MMOL/L (ref 21–32)
CREAT SERPL-MCNC: 0.98 MG/DL (ref 0.6–1)
DIFFERENTIAL METHOD BLD: ABNORMAL
EOSINOPHIL # BLD: 0.2 K/UL (ref 0–0.8)
EOSINOPHIL NFR BLD: 4 % (ref 0.5–7.8)
ERYTHROCYTE [DISTWIDTH] IN BLOOD BY AUTOMATED COUNT: 14.6 % (ref 11.9–14.6)
GLUCOSE SERPL-MCNC: 93 MG/DL (ref 65–100)
HCT VFR BLD AUTO: 27.6 % (ref 35.8–46.3)
HGB BLD-MCNC: 8.9 G/DL (ref 11.7–15.4)
IMM GRANULOCYTES # BLD AUTO: 0 K/UL (ref 0–0.5)
IMM GRANULOCYTES NFR BLD AUTO: 0 % (ref 0–5)
LYMPHOCYTES # BLD: 1.1 K/UL (ref 0.5–4.6)
LYMPHOCYTES NFR BLD: 18 % (ref 13–44)
MCH RBC QN AUTO: 27.5 PG (ref 26.1–32.9)
MCHC RBC AUTO-ENTMCNC: 32.2 G/DL (ref 31.4–35)
MCV RBC AUTO: 85.2 FL (ref 79.6–97.8)
MONOCYTES # BLD: 0.6 K/UL (ref 0.1–1.3)
MONOCYTES NFR BLD: 11 % (ref 4–12)
NEUTS SEG # BLD: 3.9 K/UL (ref 1.7–8.2)
NEUTS SEG NFR BLD: 67 % (ref 43–78)
NRBC # BLD: 0 K/UL (ref 0–0.2)
PLATELET # BLD AUTO: 209 K/UL (ref 150–450)
PMV BLD AUTO: 11.3 FL (ref 9.4–12.3)
POTASSIUM SERPL-SCNC: 4.1 MMOL/L (ref 3.5–5.1)
RBC # BLD AUTO: 3.24 M/UL (ref 4.05–5.2)
SODIUM SERPL-SCNC: 140 MMOL/L (ref 136–145)
WBC # BLD AUTO: 5.8 K/UL (ref 4.3–11.1)

## 2020-05-23 PROCEDURE — 65270000029 HC RM PRIVATE

## 2020-05-23 PROCEDURE — 80048 BASIC METABOLIC PNL TOTAL CA: CPT

## 2020-05-23 PROCEDURE — 97535 SELF CARE MNGMENT TRAINING: CPT

## 2020-05-23 PROCEDURE — 97530 THERAPEUTIC ACTIVITIES: CPT

## 2020-05-23 PROCEDURE — 85025 COMPLETE CBC W/AUTO DIFF WBC: CPT

## 2020-05-23 PROCEDURE — 74011250637 HC RX REV CODE- 250/637: Performed by: ORTHOPAEDIC SURGERY

## 2020-05-23 PROCEDURE — 77030038269 HC DRN EXT URIN PURWCK BARD -A

## 2020-05-23 PROCEDURE — 36415 COLL VENOUS BLD VENIPUNCTURE: CPT

## 2020-05-23 RX ADMIN — FLUOXETINE 40 MG: 20 CAPSULE ORAL at 09:17

## 2020-05-23 RX ADMIN — Medication 1 TABLET: at 09:17

## 2020-05-23 RX ADMIN — Medication 1 TABLET: at 13:17

## 2020-05-23 RX ADMIN — AMLODIPINE BESYLATE 5 MG: 5 TABLET ORAL at 06:14

## 2020-05-23 RX ADMIN — POTASSIUM CHLORIDE 20 MEQ: 20 TABLET, EXTENDED RELEASE ORAL at 09:17

## 2020-05-23 RX ADMIN — HYDROCODONE BITARTRATE AND ACETAMINOPHEN 2 TABLET: 5; 325 TABLET ORAL at 02:39

## 2020-05-23 RX ADMIN — CYPROHEPTADINE HYDROCHLORIDE 4 MG: 4 TABLET ORAL at 09:17

## 2020-05-23 RX ADMIN — LEVOTHYROXINE SODIUM 50 MCG: 0.05 TABLET ORAL at 06:14

## 2020-05-23 RX ADMIN — Medication 1 TABLET: at 18:04

## 2020-05-23 RX ADMIN — ASPIRIN 325 MG ORAL TABLET 325 MG: 325 PILL ORAL at 09:17

## 2020-05-23 RX ADMIN — BUSPIRONE HYDROCHLORIDE 5 MG: 5 TABLET ORAL at 09:17

## 2020-05-23 NOTE — PROGRESS NOTES
Orthopedic Progress Note    May 23, 2020  Admit Date: 2020  Admit Diagnosis: Closed left hip fracture ([facility] [address] [S72.002A]  Constipation [K59.00]    Post Op day: 2 Days Post-Op    Subjective:     Ana Olsen     Appears to be doing okay. C/O some posterior pelvic pain       Objective:     Vital Signs:    Temp (24hrs), Av.6 °F (37 °C), Min:98.2 °F (36.8 °C), Max:99.2 °F (37.3 °C)      LAB:    [unfilled]  Lab Results   Component Value Date/Time    INR 1.0 2020 05:13 PM    INR 1.0 2017 02:16 PM     Lab Results   Component Value Date/Time    HGB 8.9 (L) 2020 05:24 AM    HGB 9.1 (L) 2020 04:54 AM       Physical Exam:    No apparent distress   No neurovascular issues reported  No gross problems noted   L hip dressing clean & dry.  No focal lesion posteriorly    Pelvic XR- no posterior injury demonstrated    Plan:     Continue PT/OT rehab as indicated    Nursing and SS for disposition plans         Signed By: Laura Ruiz MD

## 2020-05-23 NOTE — PROGRESS NOTES
Dressing changed per order using sterile technique. Guaze and Tegaderm applied. Patient tolerated well.

## 2020-05-23 NOTE — PROGRESS NOTES
History of Present Illness/Hospital Course:  71-year-old female presenting to the emergency department on 5/20/20 after two consecutive falls on consecutive days. In the emergency department the patient was found to have left femoral neck fracture. Orthopedic surgery was consulted. Patient had uncomplicated surgical fixation on 5/21/2020. Patient has no complaints today. Today: PT recommending short-term rehab. Case management working towards. Patient medically stable for discharge. Comprehensive review of systems negative unless stated above. I have personally reviewed all current data for this patient. Physical Exam:  General: Elderly black female, lying in bed, confused, no acute distress  HEENT: NCAT, moist mucous membranes  Skin: No rash noted  Cardio: RRR, normal S1/S2, no rubs, no gallops, no murmurs  Pulm: Non labored respirations on room air, LCAB, no wheezing, no rales, no rhonchi  GI: Soft, Nt, Nd, Nml bowel sounds, no masses noted  Extremity: Left leg in brace  Neuro: Alert, oriented, moving all extremities, no focal deficits noted  Psych: Pleasant, cooperative, normal range of affect    Assessment and Plan:    #Left femoral neck fracture:  -All management per orthopedic surgery    #HTN: Continue home amlodipine, metoprolol  #Depression: Continue home buspirone, fluoxetine  #Hypotoyroidism: Continue home levothyroxine    Inpatient for above, physical therapy recommend discharge to skilled nursing facility for short-term rehab but the family is refusing. Patient medically stable for discharge. Discharge planning per case management.     Full code    All VTE prophylaxis per orthopedic surgery

## 2020-05-23 NOTE — PROGRESS NOTES
Problem: Mobility Impaired (Adult and Pediatric)  Goal: *Acute Goals and Plan of Care (Insert Text)  Description: LTG:  (1.)Ms. Gregory Danielson will move from supine to sit and sit to supine, scoot up and down and roll side to side with MINIMAL ASSIST within 7 treatment day(s). (2.)Ms. Gregory Danielson will transfer from bed to chair and chair to bed with MINIMAL ASSIST using the least restrictive device within 7 treatment day(s). (3.)Ms. Gregory Danielson will ambulate with MINIMAL ASSIST for 10 feet with the least restrictive device within 7 treatment day(s). (4.)Ms. Gregory Danielson will perform exercises per HEP for 15+ minutes to improve strength and mobility within 7 days. ________________________________________________________________________________________________   Outcome: Progressing Towards Goal     PHYSICAL THERAPY: Daily Note and AM 5/23/2020  INPATIENT: PT Visit Days : 2  Payor: Dorothy Gr / Plan: Raymundo Nuñez PPO/PFFS / Product Type: [employer] Care Medicare /       NAME/AGE/GENDER: Nayeli Flynn is a 80 y.o. female   PRIMARY DIAGNOSIS: Closed left hip fracture (HCC) [S72.002A]  Constipation [K59.00] Closed left hip fracture (HCC) Closed left hip fracture (HCC)  Procedure(s) (LRB):  FEMORAL HEAD FRACTURE OPEN REDUCTION INTERNAL FIXATION Left (Left)  2 Days Post-Op  ICD-10: Treatment Diagnosis:    · Generalized Muscle Weakness (M62.81)  · Difficulty in walking, Not elsewhere classified (R26.2)  · Other abnormalities of gait and mobility (R26.89)  · History of falling (Z91.81)   Precaution/Allergies:  Patient has no known allergies. ASSESSMENT:     Ms. Gregory Danielson is a pleasantly confused 80year old female admitted from home with left hip fracture and is seen s/p ORIF. WBAT per ortho orders. Per chart review, pt lives with daughter who is her caregiver. She is typically ambulatory for household distances slowly without DME use, does have cane available.      Pt is supine in bed and pleasantly confused, needs reminders for orientation initially but as she intractd more she seemed less confused. Mod assist a x 2 and additional time to sit to EOB. She sat several minutes before attempting to stand. Attempted x 2 before she was able to stand fairly erect. She took some steps to transfer to chair at bedside 3' with RW and WBAT L. All mobility and transfers require additional time. Once in chair pt performed therapeutic strengthening exercises to B LE as listed below to improve endurance, balance and functional strength for transfers, gait and overall mobility. Patient required cues to perform exercises correctly. Pt left in chair with OTERO present. Pt limited by altered mental status, pain, and weakness. Juan Amaro is demonstrating expected post op deficits with mobility, strength, balance, transfers, and activity tolerance and will benefit from continued acute PT to address deficits/maixmize independence with mobility. Per IE, family is hoping to take pt home with [facility] PT however currently unsafe with mobility needs heavy assist of 2 so recommending rehab at [place]. At this time, patient is appropriate for Co-treatment with occupational therapy due to patient's decreased overall endurance/tolerance levels, as well as need for high level skilled assistance to complete functional transfers/mobility and functional tasks. Juan Amaro is appropriate for a multidisciplinary co-treatment of PT and OT to address goals of both disciplines. This section established at most recent assessment   PROBLEM LIST (Impairments causing functional limitations):  1. Decreased Strength  2. Decreased Transfer Abilities  3. Decreased Ambulation Ability/Technique  4. Decreased Balance  5. Increased Pain  6. Decreased Activity Tolerance  7. Decreased Pacing Skills  8. Decreased Flexibility/Joint Mobility  9. Decreased Knowledge of Precautions  10. Decreased Skin Integrity/Hygeine  11.  Decreased Cognition   INTERVENTIONS PLANNED: (Benefits and precautions of physical therapy have been discussed with the patient.)  1. Balance Exercise  2. Bed Mobility  3. Gait Training  4. Home Exercise Program (HEP)  5. Therapeutic Activites  6. Therapeutic Exercise/Strengthening  7. Transfer Training     TREATMENT PLAN: Frequency/Duration: 3 times a week for duration of hospital stay  Rehabilitation Potential For Stated Goals: Good     REHAB RECOMMENDATIONS (at time of discharge pending progress):    Placement: It is my opinion, based on this patient's performance to date, that Ms. Vincent Jensen may benefit from intensive therapy at a [address] [place] [address] after discharge due to the functional deficits listed above that are likely to improve with skilled rehabilitation and concerns that he/she may be unsafe to be unsupervised at home due to weakness, fall risk, need for heavy assistance. Equipment:    tbd pending progress              HISTORY:   History of Present Injury/Illness (Reason for Referral):  Per H&P, \"80year-old  female patient with a known history of hypertension, dementia, hypothyroidism and osteoporosis. Patient was seen yesterday in emergency room for history of fall. She had 1 more fall last night and brought to the emergency room again for further evaluation . Patient has been complaining of left hip pain.     Apart from fall and left hip pain of the 10 directions apart from the one mentioned above patient other review of systems were negative noncontributory.     WBC of 11.5, creatinine of 1.28, GFR of 50. Left hip x-ray-  Impression: Question acute nondisplaced transcervical fracture of the left  femoral neck.  MRI of the left hip without contrast may be more sensitive in  Evaluation.     Patient will be transferred to [facility] for further management of left hip fracture and constipation(based on the x-ray KUB done yesterday). \"  Past Medical History/Comorbidities:   Ms. Vincent Jensen  has a past medical history of Anxiety, Hematuria, microscopic, Hypercholesteremia, Hypertension, Hypothyroidism, IBS (irritable bowel syndrome), Osteoporosis, Sciatica, Short-term memory loss, and Sleep apnea. Ms. Selina Ni  has a past surgical history that includes hx hysterectomy; hx svt ablation; and hx vitrectomy (Bilateral). Social History/Living Environment:   Home Environment: Private residence  Living Alone: No  Support Systems: Family member(s), Child(marv)  Patient Expects to be Discharged to[de-identified] Unknown  Current DME Used/Available at Home: Shower chair, Walker, rolling  Tub or Shower Type: Shower  Prior Level of Function/Work/Activity:  Lives with daughter who is her caregiver and assists with ADLs, mobility. Per chart review, pt has walker but does not typically use. Ambulates slowly in hallway without DME use. Number of Personal Factors/Comorbidities that affect the Plan of Care: 3+: HIGH COMPLEXITY   EXAMINATION:   Most Recent Physical Functioning:   Gross Assessment:                  Posture:     Balance:  Sitting - Static: Fair (occasional)(+)  Sitting - Dynamic: Fair (occasional)(+)  Standing - Static: Poor  Standing - Dynamic : Poor Bed Mobility:  Supine to Sit: Moderate assistance  Wheelchair Mobility:     Transfers:  Sit to Stand: Moderate assistance;Assist x2  Stand to Sit: Moderate assistance;Assist x2  Bed to Chair: Moderate assistance;Assist x2  Gait:  Left Side Weight Bearing: As tolerated  Base of Support: Shift to right  Speed/Manasa: Pace decreased (<100 feet/min)  Step Length: Left shortened;Right shortened  Gait Abnormalities: Antalgic; Step to gait  Distance (ft): 3 Feet (ft)  Assistive Device: Walker, rolling  Ambulation - Level of Assistance: Moderate assistance;Assist x2  Interventions: Manual cues; Safety awareness training;Verbal cues      Body Structures Involved:  1. Eyes and Ears  2. Bones  3. Joints  4. Muscles Body Functions Affected:  1. Mental  2. Sensory/Pain  3. Neuromusculoskeletal  4.  Movement Related Activities and Participation Affected:  1. General Tasks and Demands  2. Mobility  3. Domestic Life  4. Community, Social and [place]   Number of elements that affect the Plan of Care: 4+: HIGH COMPLEXITY   CLINICAL PRESENTATION:   Presentation: Evolving clinical presentation with changing clinical characteristics: MODERATE COMPLEXITY   CLINICAL DECISION MAKIN [facility] Mobility Inpatient Short Form  How much difficulty does the patient currently have. .. Unable A Lot A Little None   1. Turning over in bed (including adjusting bedclothes, sheets and blankets)? [] 1   [x] 2   [] 3   [] 4   2. Sitting down on and standing up from a chair with arms ( e.g., wheelchair, bedside commode, etc.)   [] 1   [x] 2   [] 3   [] 4   3. Moving from lying on back to sitting on the side of the bed? [] 1   [x] 2   [] 3   [] 4   How much help from another person does the patient currently need. .. Total A Lot A Little None   4. Moving to and from a bed to a chair (including a wheelchair)? [] 1   [x] 2   [] 3   [] 4   5. Need to walk in hospital room? [x] 1   [] 2   [] 3   [] 4   6. Climbing 3-5 steps with a railing? [x] 1   [] 2   [] 3   [] 4   © , Trustees of [address] [place] [address] [address] [address], under license to [employer]. All rights reserved      Score:  Initial: 10 Most Recent: X (Date: -- )    Interpretation of Tool:  Represents activities that are increasingly more difficult (i.e. Bed mobility, Transfers, Gait). Medical Necessity:     · Patient demonstrates good rehab potential due to higher previous functional level. Reason for Services/Other Comments:  · Patient continues to demonstrate capacity to improve strength, mobility, balance, transfers, and activity tolerance which will increase independence, decrease amount of assistance required from caregiver and increase safety.    Use of outcome tool(s) and clinical judgement create a POC that gives a: Questionable prediction of patient's progress: MODERATE COMPLEXITY            TREATMENT:   (In addition to Assessment/Re-Assessment sessions the following treatments were rendered)   Pre-treatment Symptoms/Complaints:  \"I'm in the hospital? \"  Pain: Initial:   Pain Intensity 1: 0  Post Session:  0/10     Today's treatment session addressed Decreased Transfer Abilities, Increased Pain, Decreased Activity Tolerance and Decreased Cognition to progress towards achieving goal(s) 1-4. During this session, Occupational Therapy addressed Functional Transfers to progress towards their discipline specific goal(s). Co-treatment was necessary to improve patient's cognitive participation, ability to follow higher level commands and ability to increase activity demands. Therapeutic Activity: (   23 min ):  Therapeutic activities including Bed transfers, Chair transfers and Ambulation on level ground to improve mobility, strength, balance, coordination and activity tolerance. Required moderate assist of 2 and heavy Manual cues; Safety awareness training;Verbal cues to promote static and dynamic balance in standing and promote motor control of bilateral, lower extremity(s). Date:  5/23 Date:   Date:     Activity/Exercise Seated Parameters Parameters Parameters   Heel raises X 20 B     Toe raises X 20 B     LAQ's X 20 B     Hip Flex X 20 B     Hip ABD X 20 B                           Braces/Orthotics/Lines/Etc:   · IV  · O2 Device: Room air  Treatment/Session Assessment:    · Response to Treatment: Mod A x2 limited by pain, weakness, cognition    · Interdisciplinary Collaboration:   o Physical Therapy Assistant  o Certified Occupational Therapy Assistant  o Registered Nurse  · After treatment position/precautions:   o Up in chair  o Bed alarm/tab alert on  o Bed/Chair-wheels locked  o Bed in low position  o Call light within reach  o RN notified   · Compliance with Program/Exercises:  Will assess as treatment progresses  · Recommendations/Intent for next treatment session: \"Next visit will focus on advancements to more challenging activities and reduction in assistance provided\".   Total Treatment Duration:  PT Patient Time In/Time Out  Time In: 0923  Time Out: Alexandra Dick PTA

## 2020-05-23 NOTE — PROGRESS NOTES
Problem: Mobility Impaired (Adult and Pediatric)  Goal: *Acute Goals and Plan of Care (Insert Text)  Description: LTG:  (1.)Ms. Mohamud Hendricks will move from supine to sit and sit to supine, scoot up and down and roll side to side with MINIMAL ASSIST within 7 treatment day(s). (2.)Ms. Mohamud Hendricks will transfer from bed to chair and chair to bed with MINIMAL ASSIST using the least restrictive device within 7 treatment day(s). (3.)Ms. Mohamud Hendricks will ambulate with MINIMAL ASSIST for 10 feet with the least restrictive device within 7 treatment day(s). (4.)Ms. Mohamud Hendricks will perform exercises per HEP for 15+ minutes to improve strength and mobility within 7 days. ________________________________________________________________________________________________   Outcome: Progressing Towards Goal     PHYSICAL THERAPY: Daily Note and PM 5/23/2020  INPATIENT: PT Visit Days : 2  Payor: Bryan Tee / Plan: [id number] Nuñez PPO/PFFS / Product Type: [employer] Medicare /       NAME/AGE/GENDER: Eneida Jones is a 80 y.o. female   PRIMARY DIAGNOSIS: Closed left hip fracture (HCC) [S72.002A]  Constipation [K59.00] Closed left hip fracture (HCC) Closed left hip fracture (HCC)  Procedure(s) (LRB):  FEMORAL HEAD FRACTURE OPEN REDUCTION INTERNAL FIXATION Left (Left)  2 Days Post-Op  ICD-10: Treatment Diagnosis:    · Generalized Muscle Weakness (M62.81)  · Difficulty in walking, Not elsewhere classified (R26.2)  · Other abnormalities of gait and mobility (R26.89)  · History of falling (Z91.81)   Precaution/Allergies:  Patient has no known allergies. ASSESSMENT:     Ms. Mohamud Hendricks is a pleasantly confused 80year old female admitted from home with left hip fracture and is seen s/p ORIF. WBAT per ortho orders. Per chart review, pt lives with daughter who is her caregiver. She is typically ambulatory for household distances slowly without DME use, does have cane available.      Pt is sitting up in chair on contact and agreeable to work with therapy. Sit to stand on 3rd attempt with max a. Once standing it took her additional time to stand erect and hold her own wt. She took steps with repeated cues to EOB 3' and mod a and additional time. EOB to supine with max/total a x 2. Worked on bed mobility to get positioned in the bed comfortably. Left with needs in reach. Virgene Claude is demonstrating expected post op deficits with mobility, strength, balance, transfers, and activity tolerance and will benefit from continued acute PT to address deficits/maixmize independence with mobility. Per IE, family is hoping to take pt home with [facility] PT however currently unsafe with mobility needs heavy assist x 1 - 2 so recommending rehab at [place]. Co-Rx in AM only   At this time, patient is appropriate for Co-treatment with occupational therapy due to patient's decreased overall endurance/tolerance levels, as well as need for high level skilled assistance to complete functional transfers/mobility and functional tasks. Virgene Claude is appropriate for a multidisciplinary co-treatment of PT and OT to address goals of both disciplines. This section established at most recent assessment   PROBLEM LIST (Impairments causing functional limitations):  1. Decreased Strength  2. Decreased Transfer Abilities  3. Decreased Ambulation Ability/Technique  4. Decreased Balance  5. Increased Pain  6. Decreased Activity Tolerance  7. Decreased Pacing Skills  8. Decreased Flexibility/Joint Mobility  9. Decreased Knowledge of Precautions  10. Decreased Skin Integrity/Hygeine  11. Decreased Cognition   INTERVENTIONS PLANNED: (Benefits and precautions of physical therapy have been discussed with the patient.)  1. Balance Exercise  2. Bed Mobility  3. Gait Training  4. Home Exercise Program (HEP)  5. Therapeutic Activites  6. Therapeutic Exercise/Strengthening  7.  Transfer Training     TREATMENT PLAN: Frequency/Duration: 3 times a week for duration of hospital stay  Rehabilitation Potential For Stated Goals: Good     REHAB RECOMMENDATIONS (at time of discharge pending progress):    Placement: It is my opinion, based on this patient's performance to date, that Ms. Mary Kay Schaefer may benefit from intensive therapy at a [address] after discharge due to the functional deficits listed above that are likely to improve with skilled rehabilitation and concerns that he/she may be unsafe to be unsupervised at home due to weakness, fall risk, need for heavy assistance. Equipment:    tbd pending progress              HISTORY:   History of Present Injury/Illness (Reason for Referral):  Per H&P, \"[address]year-old  female patient with a known history of hypertension, dementia, hypothyroidism and osteoporosis. Patient was seen yesterday in emergency room for history of fall. She had 1 more fall last night and brought to the emergency room again for further evaluation . Patient has been complaining of left hip pain.     Apart from fall and left hip pain of the 10 directions apart from the one mentioned above patient other review of systems were negative noncontributory.     WBC of 11.5, creatinine of 1.28, GFR of 50. Left hip x-ray-  Impression: Question acute nondisplaced transcervical fracture of the left  femoral neck.  MRI of the left hip without contrast may be more sensitive in  Evaluation.     Patient will be transferred to [facility] for further management of left hip fracture and constipation(based on the x-ray KUB done yesterday). \"  Past Medical History/Comorbidities:   Ms. Mary Kay Schaefer  has a past medical history of Anxiety, Hematuria, microscopic, Hypercholesteremia, Hypertension, Hypothyroidism, IBS (irritable bowel syndrome), Osteoporosis, Sciatica, Short-term memory loss, and Sleep apnea. Ms. Mary Kay Schaefer  has a past surgical history that includes hx hysterectomy; hx svt ablation; and hx vitrectomy (Bilateral).   Social History/Living Environment:   Home Environment: Private residence  Living Alone: No  Support Systems: Family member(s), Child(marv)  Patient Expects to be Discharged to[de-identified] Unknown  Current DME Used/Available at Home: Shower chair, Walker, rolling  Tub or Shower Type: Shower  Prior Level of Function/Work/Activity:  Lives with daughter who is her caregiver and assists with ADLs, mobility. Per chart review, pt has walker but does not typically use. Ambulates slowly in hallway without DME use. Number of Personal Factors/Comorbidities that affect the Plan of Care: 3+: HIGH COMPLEXITY   EXAMINATION:   Most Recent Physical Functioning:   Gross Assessment:                  Posture:     Balance:  Sitting - Static: Fair (occasional)(+)  Sitting - Dynamic: Fair (occasional)(+)  Standing - Static: Poor  Standing - Dynamic : Poor Bed Mobility:  Supine to Sit: Moderate assistance  Sit to Supine: Maximum assistance;Assist x2  Wheelchair Mobility:     Transfers:  Sit to Stand: Maximum assistance;Assist x1  Stand to Sit: Moderate assistance;Assist x1  Bed to Chair: Moderate assistance;Assist x2  Gait:  Left Side Weight Bearing: As tolerated  Base of Support: Shift to right  Speed/Manasa: Pace decreased (<100 feet/min)  Step Length: Left shortened;Right shortened  Gait Abnormalities: Antalgic; Step to gait  Distance (ft): 3 Feet (ft)  Assistive Device: Walker, rolling  Ambulation - Level of Assistance: Moderate assistance; Additional time;Assist x1  Interventions: Manual cues; Safety awareness training;Verbal cues      Body Structures Involved:  1. Eyes and Ears  2. Bones  3. Joints  4. Muscles Body Functions Affected:  1. Mental  2. Sensory/Pain  3. Neuromusculoskeletal  4. Movement Related Activities and Participation Affected:  1. General Tasks and Demands  2. Mobility  3. Domestic Life  4.  Community, Social and [place]   Number of elements that affect the Plan of Care: 4+: HIGH COMPLEXITY   CLINICAL PRESENTATION:   Presentation: Evolving clinical presentation with changing clinical characteristics: MODERATE COMPLEXITY   CLINICAL DECISION MAKIN[address] [place] [address] AM-PAC 6 Clicks   Basic Mobility Inpatient Short Form  How much difficulty does the patient currently have. .. Unable A Lot A Little None   1. Turning over in bed (including adjusting bedclothes, sheets and blankets)? [] 1   [x] 2   [] 3   [] 4   2. Sitting down on and standing up from a chair with arms ( e.g., wheelchair, bedside commode, etc.)   [] 1   [x] 2   [] 3   [] 4   3. Moving from lying on back to sitting on the side of the bed? [] 1   [x] 2   [] 3   [] 4   How much help from another person does the patient currently need. .. Total A Lot A Little None   4. Moving to and from a bed to a chair (including a wheelchair)? [] 1   [x] 2   [] 3   [] 4   5. Need to walk in hospital room? [x] 1   [] 2   [] 3   [] 4   6. Climbing 3-5 steps with a railing? [x] 1   [] 2   [] 3   [] 4   © , Trustees of [address] [place], [place] [id number], under license to [employer]. All rights reserved      Score:  Initial: 10 Most Recent: X (Date: -- )    Interpretation of Tool:  Represents activities that are increasingly more difficult (i.e. Bed mobility, Transfers, Gait). Medical Necessity:     · Patient demonstrates good rehab potential due to higher previous functional level. Reason for Services/Other Comments:  · Patient continues to demonstrate capacity to improve strength, mobility, balance, transfers, and activity tolerance which will increase independence, decrease amount of assistance required from caregiver and increase safety. Use of outcome tool(s) and clinical judgement create a POC that gives a: Questionable prediction of patient's progress: MODERATE COMPLEXITY            TREATMENT:   (In addition to Assessment/Re-Assessment sessions the following treatments were rendered)   Pre-treatment Symptoms/Complaints:  \"I think I'll take a nap.   \"  Pain: Initial:   Pain Intensity 1: 0  Post Session:  010 Therapeutic Activity: (   23 min ):  Therapeutic activities including Bed transfers, Chair transfers and Ambulation on level ground to improve mobility, strength, balance, coordination and activity tolerance. Required moderate assist of 2 and heavy Manual cues; Safety awareness training;Verbal cues to promote static and dynamic balance in standing and promote motor control of bilateral, lower extremity(s). Date:  5/23 Date:   Date:     Activity/Exercise Seated Parameters Parameters Parameters   Heel raises X 20 B     Toe raises X 20 B     LAQ's X 20 B     Hip Flex X 20 B     Hip ABD X 20 B                           Braces/Orthotics/Lines/Etc:   · IV  · O2 Device: Room air  Treatment/Session Assessment:    · Response to Treatment: Needs repeated cues and additional time. Limited by pain, weakness and cognition    · Interdisciplinary Collaboration:   o Physical Therapy Assistant  o Registered Nurse  · After treatment position/precautions:   o Supine in bed  o Bed alarm/tab alert on  o Bed/Chair-wheels locked  o Bed in low position  o Call light within reach  o RN notified   · Compliance with Program/Exercises: Will assess as treatment progresses  · Recommendations/Intent for next treatment session: \"Next visit will focus on advancements to more challenging activities and reduction in assistance provided\".   Total Treatment Duration:  PT Patient Time In/Time Out  Time In: 1322  Time Out: Ariel Dick, PTA

## 2020-05-23 NOTE — PROGRESS NOTES
END OF SHIFT NOTE:    INTAKE/OUTPUT  No intake/output data recorded. Voiding: YES  Catheter: NO  Drain:      Flatus: Patient does have flatus present. Stool:  0 occurrences. Characteristics:       Emesis: 0 occurrences. Characteristics:        VITAL SIGNS  Patient Vitals for the past 12 hrs:   Temp Pulse Resp BP SpO2   05/23/20 1515 98.3 °F (36.8 °C) 72 17 149/71 90 %   05/23/20 1113 98.3 °F (36.8 °C) 65 18 121/74 90 %   05/23/20 0743 98.3 °F (36.8 °C) (!) 59 18 118/66 90 %       Pain Assessment  Pain Intensity 1: 0 (05/23/20 1318)  Pain Location 1: Back, Hip  Pain Intervention(s) 1: Repositioned  Patient Stated Pain Goal: 0    Ambulating  Yes with assistance, and with PT/OT. Patient sat up in the chair most of the day and tolerated well. Shift report to be given to oncoming nurse at the bedside.     Rosa Monroy RN

## 2020-05-23 NOTE — PROGRESS NOTES
Problem: Self Care Deficits Care Plan (Adult)  Goal: *Acute Goals and Plan of Care (Insert Text)  Description: 1. Patient will complete lower body bathing and dressing with minimal assistance and adaptive equipment as needed. 2. Patient will complete toileting with minimal assistance. 3. Patient will tolerate 30 minutes of OT treatment with 2-3 rest breaks to increase activity tolerance for ADLs. 4. Patient will complete functional transfers with minimal assistance and adaptive equipment as needed. 5. Patient will complete functional mobility for household distances with minimal assistance and appropriate safety awareness. 5. Patient will tolerate standing sink side with minimal assistance while participating in grooming tasks. Timeframe: 7 visits      Outcome: Progressing Towards Goal     OCCUPATIONAL THERAPY: Daily Note and AM 5/23/2020  INPATIENT: OT Visit Days: 2  Payor: Rena Sutton / Plan: [employer] MEDICARE CHOICE PPO/PFFS / Product Type: [employer] /    L LE WBAT   NAME/AGE/GENDER: Li Bran is a 80 y.o. female   PRIMARY DIAGNOSIS:  Closed left hip fracture (HCC) [S72.002A]  Constipation [K59.00] Closed left hip fracture (HCC) Closed left hip fracture (HCC)  Procedure(s) (LRB):  FEMORAL HEAD FRACTURE OPEN REDUCTION INTERNAL FIXATION Left (Left)  2 Days Post-Op  ICD-10: Treatment Diagnosis:    · Pain in left hip (M25.552)  · Stiffness of Left Hip, Not elsewhere classified (M25.652)  · Generalized Muscle Weakness (M62.81)  · Other lack of cordination (R27.8)  · History of falling (Z91.81)   Precautions/Allergies:     Patient has no known allergies. ASSESSMENT:     Ms. Gallo Bonner presents to the hospital after falling and sustaining L hip fx. Pt is s/p ORIF L hip and is WBAT in the LLE. Pt is alert and sitting up in the chair upon arrival. Pt is extremely pleasant and cooperative. Pt is limited with movement due to pain in her L hip. Pt is oriented to person and time.  Pt does appear to have some memory issues with patient tending to repeat herself throughout the assessment. Pt appears to have functional upper body strength for assisting with scooting to the edge of the chair with minimal assistance. 5/23/2020 Pt was supine in bed upon arrival. Pt completed sponge bath while sitting in chair with the assistance listed below. Good progress made. Continue POC. Co-Rx in AM only   At this time, patient is appropriate for Co-treatment with physical therapy due to patient's decreased overall endurance/tolerance levels, as well as need for high level skilled assistance to complete functional transfers/mobility and functional tasks. Alex Parisi is appropriate for a multidisciplinary co-treatment of PT and OT to address goals of both disciplines. This section established at most recent assessment   PROBLEM LIST (Impairments causing functional limitations):  1. Decreased Strength  2. Decreased ADL/Functional Activities  3. Decreased Transfer Abilities  4. Decreased Ambulation Ability/Technique  5. Decreased Balance  6. Increased Pain  7. Decreased Activity Tolerance  8. Decreased Flexibility/Joint Mobility  9. Decreased [place] with Home Exercise Program  10. Decreased Cognition   INTERVENTIONS PLANNED: (Benefits and precautions of occupational therapy have been discussed with the patient.)  1. Activities of daily living training  2. Adaptive equipment training  3. Balance training  4. Clothing management  5. Cognitive training  6. Donning&doffing training  7. Neuromuscular re-eduation  8. Therapeutic activity  9. Therapeutic exercise     TREATMENT PLAN: Frequency/Duration: Follow patient 6 times per week to address above goals. Rehabilitation Potential For Stated Goals: Good     REHAB RECOMMENDATIONS (at time of discharge pending progress):    Placement: It is my opinion, based on this patient's performance to date, that Ms. Shira Dixon may benefit from intensive therapy at a SKILLED NURSING FACILITY after discharge due to the functional deficits listed above that are likely to improve with skilled rehabilitation and concerns that he/she may be unsafe to be unsupervised at home due to risk for falls and further functional decline. Equipment:    TBD               OCCUPATIONAL PROFILE AND HISTORY:   History of Present Injury/Illness (Reason for Referral):  See H&P  Past Medical History/Comorbidities:   Ms. Gloria oLpez  has a past medical history of Anxiety, Hematuria, microscopic, Hypercholesteremia, Hypertension, Hypothyroidism, IBS (irritable bowel syndrome), Osteoporosis, Sciatica, Short-term memory loss, and Sleep apnea. Ms. Gloria Lopez  has a past surgical history that includes hx hysterectomy; hx svt ablation; and hx vitrectomy (Bilateral). Social History/Living Environment:   Home Environment: Private residence  Living Alone: No  Support Systems: Family member(s), Child(marv)  Patient Expects to be Discharged to[de-identified] Unknown  Current DME Used/Available at Home: Shower chair, Walker, rolling  Tub or Shower Type: Shower  Prior Level of Function/Work/Activity:  Pt lives at home with her daughter that assists with her care. Pt reports she uses a rolling walker on occasion. Pt completes her own toileting needs and states she can dress herself. Pt completes a sponge bath or her daughter assist her into the shower. Personal Factors:          Age:  7[address] y.o. Past/Current Experience:  recent fall and s/p L Hip ORIF           Number of Personal Factors/Comorbidities that affect the Plan of Care: Expanded review of therapy/medical records (1-2):  MODERATE COMPLEXITY   ASSESSMENT OF OCCUPATIONAL PERFORMANCE[de-identified]   Activities of Daily Living:   Basic ADLs (From Assessment) Complex ADLs (From Assessment)   Feeding: Stand-by assistance  Oral Facial Hygiene/Grooming: Minimum assistance  Bathing:  Moderate assistance  Upper Body Dressing: Minimum assistance  Lower Body Dressing: Maximum assistance  Toileting: Maximum assistance Instrumental ADL  Meal Preparation: Total assistance  Homemaking: Total assistance   Grooming/Bathing/Dressing Activities of Daily Living   Grooming  Washing Face: Set-up Cognitive Retraining  Safety/Judgement: Fall prevention   Upper Body Bathing  Bathing Assistance: Set-up  Position Performed: Seated in chair     Lower Body Bathing  Bathing Assistance: Moderate assistance  Position Performed: Seated in chair  Lower Body : Moderate assistance     Upper Body Dressing Assistance  Dressing Assistance: [place]: Set-up     Lower Body Dressing Assistance  Socks: Moderate assistance  Cues: Doff; Don Bed/Mat Mobility  Supine to Sit: Moderate assistance;Assist x2  Sit to Stand: Moderate assistance;Assist x2  Stand to Sit: Moderate assistance;Assist x2  Bed to Chair: Moderate assistance;Assist x2     Most Recent Physical Functioning:   Gross Assessment:                  Posture:  Posture (WDL): Exceptions to WDL  Posture Assessment: Forward head, Rounded shoulders, Trunk flexion  Balance:  Sitting: Intact  Sitting - Static: Fair (occasional)(+)  Sitting - Dynamic: Fair (occasional)(+)  Standing: Pull to stand; With support  Standing - Static: Poor  Standing - Dynamic : Poor Bed Mobility:  Supine to Sit: Moderate assistance;Assist x2  Wheelchair Mobility:     Transfers:  Sit to Stand:  Moderate assistance;Assist x2  Stand to Sit: Moderate assistance;Assist x2  Bed to Chair: Moderate assistance;Assist x2            Patient Vitals for the past 6 hrs:   BP BP Patient Position SpO2 Pulse   05/23/20 0743 118/66 At rest 90 % (!) 59   05/23/20 1113 121/74 At rest 90 % 65       Mental Status  Neurologic State: Alert  Orientation Level: Oriented X4  Cognition: Appropriate for age attention/concentration, Appropriate decision making  Perception: Verbal, Tactile  Perseveration: Verbal cues provided, Tactile cues provided  Safety/Judgement: Fall prevention Physical Skills Involved:  1. Range of Motion  2. Balance  3. Strength  4. Activity Tolerance  5. Pain (acute) Cognitive Skills Affected (resulting in the inability to perform in a timely and safe manner):  1. Executive Function  2. Short Term Recall Psychosocial Skills Affected:  1. Habits/Routines  2. Environmental Adaptation  3. Self-Awareness   Number of elements that affect the Plan of Care: 5+:  HIGH COMPLEXITY   CLINICAL DECISION MAKIN[address] [place] [address] AM-PAC 6 Clicks   Daily Activity Inpatient Short Form  How much help from another person does the patient currently need. .. Total A Lot A Little None   1. Putting on and taking off regular lower body clothing? [] 1   [x] 2   [] 3   [] 4   2. Bathing (including washing, rinsing, drying)? [] 1   [x] 2   [] 3   [] 4   3. Toileting, which includes using toilet, bedpan or urinal?   [] 1   [x] 2   [] 3   [] 4   4. Putting on and taking off regular upper body clothing? [] 1   [] 2   [x] 3   [] 4   5. Taking care of personal grooming such as brushing teeth? [] 1   [] 2   [x] 3   [] 4   6. Eating meals? [] 1   [] 2   [x] 3   [] 4   © , Trustees of [address] [place] [address], under license to [employer]. All rights reserved      Score:  Initial: 15 Most Recent: X (Date: -- )    Interpretation of Tool:  Represents activities that are increasingly more difficult (i.e. Bed mobility, Transfers, Gait). Medical Necessity:     · Patient demonstrates   · good and excellent  ·  rehab potential due to higher previous functional level. Reason for Services/Other Comments:  · Patient continues to require skilled intervention due to   · Decreased independence with ADL/functional transfers.     · .   Use of outcome tool(s) and clinical judgement create a POC that gives a: LOW COMPLEXITY         TREATMENT:   (In addition to Assessment/Re-Assessment sessions the following treatments were rendered)     Pre-treatment Symptoms/Complaints:    Pain: Initial:   Pain Intensity 1: 6  Pain Location 1: Back, Hip  Pain Intervention(s) 1: Repositioned  Post Session:  same     Self Care: (25): Procedure(s) (per grid) utilized to improve and/or restore self-care/home management as related to dressing and bathing. Required mod  verbal and manual cueing to facilitate activities of daily living skills. Today's treatment session addressed Decreased Strength, Decreased ADL/Functional Activities, Decreased Transfer Abilities, Decreased Ambulation Ability/Technique and Decreased Balance to progress towards achieving goal(s). During this session,  Physical Therapy addressed  Functional Transfers to progress towards their discipline specific goal(s). Co-treatment was necessary to improve patient's ability to increase activity demands. Braces/Orthotics/Lines/Etc:   · IV  · O2 Device: Room air  Treatment/Session Assessment:    · Response to Treatment:  Pt tolerated it well but with reported pain in L hip post-op. · Interdisciplinary Collaboration:   o Physical Therapy Assistant  o Certified Occupational Therapy Assistant  o Registered Nurse  · After treatment position/precautions:   o Up in chair  o Bed alarm/tab alert on  o Bed/Chair-wheels locked  o Call light within reach  o RN notified  o Side rails x 2   · Compliance with Program/Exercises: Will assess as treatment progresses. · Recommendations/Intent for next treatment session: \"Next visit will focus on advancements to more challenging activities and reduction in assistance provided\".   Total Treatment Duration:  OT Patient Time In/Time Out  Time In: 0915  Time Out: Kevyn 37, BRANDEN

## 2020-05-23 NOTE — PROGRESS NOTES
Patient has pulled out several ivs. She is not currently receiving anything by iv. Notified Dr. Nixon Ibrahim who stated that it was ok for her to be without an iv at this time.

## 2020-05-24 LAB
ANION GAP SERPL CALC-SCNC: 6 MMOL/L (ref 7–16)
BUN SERPL-MCNC: 13 MG/DL (ref 8–23)
CALCIUM SERPL-MCNC: 8.9 MG/DL (ref 8.3–10.4)
CHLORIDE SERPL-SCNC: 105 MMOL/L (ref 98–107)
CO2 SERPL-SCNC: 28 MMOL/L (ref 21–32)
CREAT SERPL-MCNC: 1 MG/DL (ref 0.6–1)
ERYTHROCYTE [DISTWIDTH] IN BLOOD BY AUTOMATED COUNT: 14.6 % (ref 11.9–14.6)
GLUCOSE SERPL-MCNC: 83 MG/DL (ref 65–100)
HCT VFR BLD AUTO: 30.4 % (ref 35.8–46.3)
HGB BLD-MCNC: 9.5 G/DL (ref 11.7–15.4)
MCH RBC QN AUTO: 26.5 PG (ref 26.1–32.9)
MCHC RBC AUTO-ENTMCNC: 31.3 G/DL (ref 31.4–35)
MCV RBC AUTO: 84.9 FL (ref 79.6–97.8)
MM INDURATION POC: 0 MM (ref 0–5)
NRBC # BLD: 0 K/UL (ref 0–0.2)
PLATELET # BLD AUTO: 268 K/UL (ref 150–450)
PMV BLD AUTO: 11.3 FL (ref 9.4–12.3)
POTASSIUM SERPL-SCNC: 3.8 MMOL/L (ref 3.5–5.1)
PPD POC: NEGATIVE NEGATIVE
RBC # BLD AUTO: 3.58 M/UL (ref 4.05–5.2)
SODIUM SERPL-SCNC: 139 MMOL/L (ref 136–145)
WBC # BLD AUTO: 6.4 K/UL (ref 4.3–11.1)

## 2020-05-24 PROCEDURE — 36415 COLL VENOUS BLD VENIPUNCTURE: CPT

## 2020-05-24 PROCEDURE — 97110 THERAPEUTIC EXERCISES: CPT

## 2020-05-24 PROCEDURE — 97530 THERAPEUTIC ACTIVITIES: CPT

## 2020-05-24 PROCEDURE — 74011250637 HC RX REV CODE- 250/637: Performed by: FAMILY MEDICINE

## 2020-05-24 PROCEDURE — 80048 BASIC METABOLIC PNL TOTAL CA: CPT

## 2020-05-24 PROCEDURE — 74011250637 HC RX REV CODE- 250/637: Performed by: ORTHOPAEDIC SURGERY

## 2020-05-24 PROCEDURE — 85027 COMPLETE CBC AUTOMATED: CPT

## 2020-05-24 PROCEDURE — 77030040393 HC DRSG OPTIFOAM GENT MDII -B

## 2020-05-24 PROCEDURE — 65270000029 HC RM PRIVATE

## 2020-05-24 RX ORDER — POLYETHYLENE GLYCOL 3350 17 G/17G
17 POWDER, FOR SOLUTION ORAL 3 TIMES DAILY
Status: DISCONTINUED | OUTPATIENT
Start: 2020-05-24 | End: 2020-05-28 | Stop reason: HOSPADM

## 2020-05-24 RX ADMIN — POTASSIUM CHLORIDE 20 MEQ: 20 TABLET, EXTENDED RELEASE ORAL at 08:28

## 2020-05-24 RX ADMIN — CYPROHEPTADINE HYDROCHLORIDE 4 MG: 4 TABLET ORAL at 08:28

## 2020-05-24 RX ADMIN — POLYETHYLENE GLYCOL 3350 17 G: 17 POWDER, FOR SOLUTION ORAL at 22:09

## 2020-05-24 RX ADMIN — Medication 1 TABLET: at 08:27

## 2020-05-24 RX ADMIN — ACETAMINOPHEN 650 MG: 325 TABLET, FILM COATED ORAL at 08:34

## 2020-05-24 RX ADMIN — METOPROLOL SUCCINATE 50 MG: 50 TABLET, EXTENDED RELEASE ORAL at 08:28

## 2020-05-24 RX ADMIN — POLYETHYLENE GLYCOL 3350 17 G: 17 POWDER, FOR SOLUTION ORAL at 16:44

## 2020-05-24 RX ADMIN — FLUOXETINE 40 MG: 20 CAPSULE ORAL at 08:28

## 2020-05-24 RX ADMIN — AMLODIPINE BESYLATE 5 MG: 5 TABLET ORAL at 06:22

## 2020-05-24 RX ADMIN — ASPIRIN 325 MG ORAL TABLET 325 MG: 325 PILL ORAL at 08:27

## 2020-05-24 RX ADMIN — Medication 1 TABLET: at 12:01

## 2020-05-24 RX ADMIN — Medication 1 TABLET: at 16:45

## 2020-05-24 RX ADMIN — BUSPIRONE HYDROCHLORIDE 5 MG: 5 TABLET ORAL at 08:28

## 2020-05-24 RX ADMIN — LEVOTHYROXINE SODIUM 50 MCG: 0.05 TABLET ORAL at 06:22

## 2020-05-24 NOTE — PROGRESS NOTES
History of Present Illness/Hospital Course:  80-year-old female presenting to the emergency department on 5/20/20 after two consecutive falls on consecutive days. In the emergency department the patient was found to have left femoral neck fracture. Orthopedic surgery was consulted. Patient had uncomplicated surgical fixation on 5/21/2020. Patient has no complaints today. Today: PT recommending short-term rehab. Case management working towards. Patient medically stable for discharge. No acute events. Patient without complaints. Comprehensive review of systems negative unless stated above. I have personally reviewed all current data for this patient. Physical Exam:  General: Elderly black female, lying in bed, confused, no acute distress  HEENT: NCAT, moist mucous membranes  Skin: No rash noted  Cardio: RRR, normal S1/S2, no rubs, no gallops, no murmurs  Pulm: Non labored respirations on room air, LCAB, no wheezing, no rales, no rhonchi  GI: Soft, Nt, Nd, Nml bowel sounds, no masses noted  Extremity: Left leg in brace  Neuro: Alert, oriented, moving all extremities, no focal deficits noted  Psych: Pleasant, cooperative, normal range of affect    Assessment and Plan:    #Left femoral neck fracture:  -All management per orthopedic surgery    #HTN: Continue home amlodipine, metoprolol  #Depression: Continue home buspirone, fluoxetine  #Hypotoyroidism: Continue home levothyroxine    Inpatient for above, physical therapy recommend discharge to skilled nursing facility for short-term rehab but the family is refusing. Patient medically stable for discharge. Discharge planning per case management.     Full code    All VTE prophylaxis per orthopedic surgery

## 2020-05-24 NOTE — PROGRESS NOTES
Orthopedic Progress Note    May 24, 2020  Admit Date: 2020  Admit Diagnosis: Closed left hip fracture ([facility] [address] [S72.002A]  Constipation [K59.00]    Post Op day: 3 Days Post-Op    Subjective:     Ana Olsen     Appears to be doing okay       Objective:     Vital Signs:    Temp (24hrs), Av.9 °F (37.2 °C), Min:98.3 °F (36.8 °C), Max:99.7 °F (37.6 °C)      LAB:    [unfilled]  Lab Results   Component Value Date/Time    INR 1.0 2020 05:13 PM    INR 1.0 2017 02:16 PM     Lab Results   Component Value Date/Time    HGB 9.5 (L) 2020 05:17 AM    HGB 8.9 (L) 2020 05:24 AM       Physical Exam:    No apparent distress   No neurovascular issues reported  No gross problems noted   Dressing clean & dry    Plan:     Continue PT/OT rehab as indicated    Nursing and SS for disposition plans    Await rehab       Signed By: Arabella Ennis MD

## 2020-05-24 NOTE — PROGRESS NOTES
Problem: Mobility Impaired (Adult and Pediatric)  Goal: *Acute Goals and Plan of Care (Insert Text)  Description: LTG:  (1.)Ms. Alek Benítez will move from supine to sit and sit to supine, scoot up and down and roll side to side with MINIMAL ASSIST within 7 treatment day(s). (2.)Ms. Alek Benítez will transfer from bed to chair and chair to bed with MINIMAL ASSIST using the least restrictive device within 7 treatment day(s). (3.)Ms. Alek Benítez will ambulate with MINIMAL ASSIST for 10 feet with the least restrictive device within 7 treatment day(s). (4.)Ms. Alek Benítez will perform exercises per HEP for 15+ minutes to improve strength and mobility within 7 days. ________________________________________________________________________________________________   Outcome: Progressing Towards Goal     PHYSICAL THERAPY: Daily Note and PM 5/24/2020  INPATIENT: PT Visit Days : 3  Payor: Madelyn Clemens / Plan: [facility] MEDICARE CHOICE PPO/PFFS / Product Type: [employer] Medicare /       NAME/AGE/GENDER: Sofia Toussaint is a 80 y.o. female   PRIMARY DIAGNOSIS: Closed left hip fracture (HCC) [S72.002A]  Constipation [K59.00] Closed left hip fracture (HCC) Closed left hip fracture (HCC)  Procedure(s) (LRB):  FEMORAL HEAD FRACTURE OPEN REDUCTION INTERNAL FIXATION Left (Left)  3 Days Post-Op  ICD-10: Treatment Diagnosis:    · Generalized Muscle Weakness (M62.81)  · Difficulty in walking, Not elsewhere classified (R26.2)  · Other abnormalities of gait and mobility (R26.89)  · History of falling (Z91.81)   Precaution/Allergies:  Patient has no known allergies. ASSESSMENT:     Ms. Alek Benítez is a pleasantly confused 80year old female admitted from home with left hip fracture and is seen s/p ORIF. WBAT per ortho orders. Per chart review, pt lives with daughter who is her caregiver. She is typically ambulatory for household distances slowly without DME use, does have cane available.      Ms. Alek Benítez was sitting in recliner on contact from AM treatment. She stood from chair with max a taking a short time to stand erect. She then was able to take a few steps 3' and turn to sit EOB. She required VC's for safety as she was wanting to reach for bed instead of holding to walker. She sat EOB. She needed max a to position supine. She is limited by altered mental status, pain, and weakness. Andrae Conn is demonstrating expected post op deficits with mobility, strength, balance, transfers, and activity tolerance and will benefit from continued acute PT to address deficits/maixmize independence with mobility. Per IE, family is hoping to take pt home with [facility] PT however currently unsafe with mobility needs heavy assist of 2 so recommending rehab at [place]. This section established at most recent assessment   PROBLEM LIST (Impairments causing functional limitations):  1. Decreased Strength  2. Decreased Transfer Abilities  3. Decreased Ambulation Ability/Technique  4. Decreased Balance  5. Increased Pain  6. Decreased Activity Tolerance  7. Decreased Pacing Skills  8. Decreased Flexibility/Joint Mobility  9. Decreased Knowledge of Precautions  10. Decreased Skin Integrity/Hygeine  11. Decreased Cognition   INTERVENTIONS PLANNED: (Benefits and precautions of physical therapy have been discussed with the patient.)  1. Balance Exercise  2. Bed Mobility  3. Gait Training  4. Home Exercise Program (HEP)  5. Therapeutic Activites  6. Therapeutic Exercise/Strengthening  7. Transfer Training     TREATMENT PLAN: Frequency/Duration: 3 times a week for duration of hospital stay  Rehabilitation Potential For Stated Goals: Good     REHAB RECOMMENDATIONS (at time of discharge pending progress):    Placement: It is my opinion, based on this patient's performance to date, that Ms. Delmi Goode may benefit from intensive therapy at [address] after discharge due to the functional deficits listed above that are likely to improve with skilled rehabilitation and concerns that he/she may be unsafe to be unsupervised at home due to weakness, fall risk, need for heavy assistance. Equipment:    tbd pending progress              HISTORY:   History of Present Injury/Illness (Reason for Referral):  Per H&P, \"80year-old  female patient with a known history of hypertension, dementia, hypothyroidism and osteoporosis. Patient was seen yesterday in emergency room for history of fall. She had 1 more fall last night and brought to the emergency room again for further evaluation . Patient has been complaining of left hip pain.     Apart from fall and left hip pain of the 10 directions apart from the one mentioned above patient other review of systems were negative noncontributory.     WBC of 11.5, creatinine of 1.28, GFR of 50. Left hip x-ray-  Impression: Question acute nondisplaced transcervical fracture of the left  femoral neck.  MRI of the left hip without contrast may be more sensitive in  Evaluation.     Patient will be transferred to [facility] for further management of left hip fracture and constipation(based on the x-ray KUB done yesterday). \"  Past Medical History/Comorbidities:   Ms. Mariah Chaidez  has a past medical history of Anxiety, Hematuria, microscopic, Hypercholesteremia, Hypertension, Hypothyroidism, IBS (irritable bowel syndrome), Osteoporosis, Sciatica, Short-term memory loss, and Sleep apnea. Ms. Mariah Chaidez  has a past surgical history that includes hx hysterectomy; hx svt ablation; and hx vitrectomy (Bilateral). Social History/Living Environment:   Home Environment: Private residence  Living Alone: No  Support Systems: Family member(s), Child(marv)  Patient Expects to be Discharged to[de-identified] Unknown  Current DME Used/Available at Home: Shower chair, Walker, rolling  Tub or Shower Type: Shower  Prior Level of Function/Work/Activity:  Lives with daughter who is her caregiver and assists with ADLs, mobility.  Per chart review, pt has walker but does not typically use. Ambulates slowly in hallway without DME use. Number of Personal Factors/Comorbidities that affect the Plan of Care: 3+: HIGH COMPLEXITY   EXAMINATION:   Most Recent Physical Functioning:   Gross Assessment:                  Posture:     Balance:  Sitting - Static: Fair (occasional)(+)  Sitting - Dynamic: Fair (occasional)(+)  Standing - Static: Poor  Standing - Dynamic : Poor Bed Mobility:  Supine to Sit: Moderate assistance  Sit to Supine: Maximum assistance  Wheelchair Mobility:     Transfers:  Sit to Stand: Maximum assistance  Stand to Sit: Moderate assistance  Gait:  Left Side Weight Bearing: As tolerated  Speed/Manasa: Pace decreased (<100 feet/min); Shuffled; Slow  Step Length: Left shortened;Right shortened  Gait Abnormalities: Antalgic; Shuffling gait; Step to gait  Distance (ft): 3 Feet (ft)  Assistive Device: Walker, rolling  Ambulation - Level of Assistance: Moderate assistance; Additional time  Interventions: Manual cues; Safety awareness training      Body Structures Involved:  1. Eyes and Ears  2. Bones  3. Joints  4. Muscles Body Functions Affected:  1. Mental  2. Sensory/Pain  3. Neuromusculoskeletal  4. Movement Related Activities and Participation Affected:  1. General Tasks and Demands  2. Mobility  3. Domestic Life  4. Community, Social and [place]   Number of elements that affect the Plan of Care: 4+: HIGH COMPLEXITY   CLINICAL PRESENTATION:   Presentation: Evolving clinical presentation with changing clinical characteristics: MODERATE COMPLEXITY   CLINICAL DECISION MAKIN [facility] Inpatient Short Form  How much difficulty does the patient currently have. .. Unable A Lot A Little None   1. Turning over in bed (including adjusting bedclothes, sheets and blankets)? [] 1   [x] 2   [] 3   [] 4   2. Sitting down on and standing up from a chair with arms ( e.g., wheelchair, bedside commode, etc.)   [] 1   [x] 2   [] 3   [] 4   3.   Moving from lying on back to sitting on the side of the bed? [] 1   [x] 2   [] 3   [] 4   How much help from another person does the patient currently need. .. Total A Lot A Little None   4. Moving to and from a bed to a chair (including a wheelchair)? [] 1   [x] 2   [] 3   [] 4   5. Need to walk in hospital room? [x] 1   [] 2   [] 3   [] 4   6. Climbing 3-5 steps with a railing? [x] 1   [] 2   [] 3   [] 4   © 2007, Trustees of [address] [place] [address] [address] [address], under license to [employer]. All rights reserved      Score:  Initial: 10 Most Recent: X (Date: -- )    Interpretation of Tool:  Represents activities that are increasingly more difficult (i.e. Bed mobility, Transfers, Gait). Medical Necessity:     · Patient demonstrates good rehab potential due to higher previous functional level. Reason for Services/Other Comments:  · Patient continues to demonstrate capacity to improve strength, mobility, balance, transfers, and activity tolerance which will increase independence, decrease amount of assistance required from caregiver and increase safety. Use of outcome tool(s) and clinical judgement create a POC that gives a: Questionable prediction of patient's progress: MODERATE COMPLEXITY            TREATMENT:   (In addition to Assessment/Re-Assessment sessions the following treatments were rendered)   Pre-treatment Symptoms/Complaints:  \"I need to find my purse. \"  Pain: Initial:   Pain Intensity 1: 0  Post Session:  0/10         Therapeutic Activity: (   13 min ): Therapeutic activities including Bed transfers, Chair transfers and Ambulation on level ground to improve mobility, strength, balance, coordination and activity tolerance. Required moderate assist Manual cues; Safety awareness training to promote static and dynamic balance in standing and promote motor control of bilateral, lower extremity(s).           Date:  5/23 Date:   Date:     Activity/Exercise Seated Parameters Parameters Parameters   Heel raises X 20 B     Toe raises X 20 B     LAQ's X 20 B     Hip Flex X 20 B     Hip ABD X 20 B                           Braces/Orthotics/Lines/Etc:   · O2 Device: Room air  Treatment/Session Assessment:    · Response to Treatment: Mod A for mobility    · Interdisciplinary Collaboration:   o Physical Therapy Assistant  o Registered Nurse  · After treatment position/precautions:   o Supine in bed  o Bed alarm/tab alert on  o Bed/Chair-wheels locked  o Bed in low position  o Call light within reach   · Compliance with Program/Exercises: Will assess as treatment progresses  · Recommendations/Intent for next treatment session: \"Next visit will focus on advancements to more challenging activities and reduction in assistance provided\".   Total Treatment Duration:  PT Patient Time In/Time Out  Time In: 4279  Time Out: 181 Chidi Dick PTA

## 2020-05-24 NOTE — PROGRESS NOTES
Problem: Mobility Impaired (Adult and Pediatric)  Goal: *Acute Goals and Plan of Care (Insert Text)  Description: LTG:  (1.)Ms. Kane Parisi will move from supine to sit and sit to supine, scoot up and down and roll side to side with MINIMAL ASSIST within 7 treatment day(s). (2.)Ms. Kane Parisi will transfer from bed to chair and chair to bed with MINIMAL ASSIST using the least restrictive device within 7 treatment day(s). (3.)Ms. Kane Parisi will ambulate with MINIMAL ASSIST for 10 feet with the least restrictive device within 7 treatment day(s). (4.)Ms. Kane Parisi will perform exercises per HEP for 15+ minutes to improve strength and mobility within 7 days. ________________________________________________________________________________________________   Outcome: Progressing Towards Goal     PHYSICAL THERAPY: Daily Note and AM 5/24/2020  INPATIENT: PT Visit Days : 3  Payor: Usama Shepherd / Plan: [facility] HUMAN MEDICARE CHOICE PPO/PFFS / Product Type: [employer] Medicare /       NAME/AGE/GENDER: Juan Amaro is a 80 y.o. female   PRIMARY DIAGNOSIS: Closed left hip fracture (HCC) [S72.002A]  Constipation [K59.00] Closed left hip fracture (HCC) Closed left hip fracture (HCC)  Procedure(s) (LRB):  FEMORAL HEAD FRACTURE OPEN REDUCTION INTERNAL FIXATION Left (Left)  3 Days Post-Op  ICD-10: Treatment Diagnosis:    · Generalized Muscle Weakness (M62.81)  · Difficulty in walking, Not elsewhere classified (R26.2)  · Other abnormalities of gait and mobility (R26.89)  · History of falling (Z91.81)   Precaution/Allergies:  Patient has no known allergies. ASSESSMENT:     Ms. Kane Parisi is a pleasantly confused 80year old female admitted from home with left hip fracture and is seen s/p ORIF. WBAT per ortho orders. Per chart review, pt lives with daughter who is her caregiver. She is typically ambulatory for household distances slowly without DME use, does have cane available.      Pt is supine in bed and pleasantly confused, needs reminders for orientation. Mod assist a and additional time to sit to EOB. She sat several minutes before attempting to stand. Attempted x 3 before she was able to stand. She took a few steps to transfer to chair at bedside 3' with RW and WBAT L. All mobility and transfers require additional time. Once in chair pt performed therapeutic strengthening exercises to B LE as listed below to improve endurance, balance and functional strength for transfers, gait and overall mobility. Patient required cues to perform exercises correctly. Pt left in chair once she was positioned for comfort with needs in reach. Pt limited by altered mental status, pain, and weakness. Eneida Jones is demonstrating expected post op deficits with mobility, strength, balance, transfers, and activity tolerance and will benefit from continued acute PT to address deficits/maixmize independence with mobility. Per IE, family is hoping to take pt home with [facility] PT however currently unsafe with mobility needs heavy assist so recommending rehab at [place]. This section established at most recent assessment   PROBLEM LIST (Impairments causing functional limitations):  1. Decreased Strength  2. Decreased Transfer Abilities  3. Decreased Ambulation Ability/Technique  4. Decreased Balance  5. Increased Pain  6. Decreased Activity Tolerance  7. Decreased Pacing Skills  8. Decreased Flexibility/Joint Mobility  9. Decreased Knowledge of Precautions  10. Decreased Skin Integrity/Hygeine  11. Decreased Cognition   INTERVENTIONS PLANNED: (Benefits and precautions of physical therapy have been discussed with the patient.)  1. Balance Exercise  2. Bed Mobility  3. Gait Training  4. Home Exercise Program (HEP)  5. Therapeutic Activites  6. Therapeutic Exercise/Strengthening  7.  Transfer Training     TREATMENT PLAN: Frequency/Duration: 3 times a week for duration of hospital stay  Rehabilitation Potential For Stated Goals: Good     REHAB RECOMMENDATIONS (at time of discharge pending progress):    Placement: It is my opinion, based on this patient's performance to date, that Ms. Oliverio Rodriguez may benefit from intensive therapy at a [address] after discharge due to the functional deficits listed above that are likely to improve with skilled rehabilitation and concerns that he/she may be unsafe to be unsupervised at home due to weakness, fall risk, need for heavy assistance. Equipment:    tbd pending progress              HISTORY:   History of Present Injury/Illness (Reason for Referral):  Per H&P, \"80year-old  female patient with a known history of hypertension, dementia, hypothyroidism and osteoporosis. Patient was seen yesterday in emergency room for history of fall. She had 1 more fall last night and brought to the emergency room again for further evaluation . Patient has been complaining of left hip pain.     Apart from fall and left hip pain of the 10 directions apart from the one mentioned above patient other review of systems were negative noncontributory.     WBC of 11.5, creatinine of 1.28, GFR of 50. Left hip x-ray-  Impression: Question acute nondisplaced transcervical fracture of the left  femoral neck.  MRI of the left hip without contrast may be more sensitive in  Evaluation.     Patient will be transferred to [facility] for further management of left hip fracture and constipation(based on the x-ray KUB done yesterday). \"  Past Medical History/Comorbidities:   Ms. Oliverio Rodriguez  has a past medical history of Anxiety, Hematuria, microscopic, Hypercholesteremia, Hypertension, Hypothyroidism, IBS (irritable bowel syndrome), Osteoporosis, Sciatica, Short-term memory loss, and Sleep apnea. Ms. Oliverio Rodriguez  has a past surgical history that includes hx hysterectomy; hx svt ablation; and hx vitrectomy (Bilateral).   Social History/Living Environment:   Home Environment: Private residence  Living Alone: No  Support Systems: Family member(s), Child(marv)  Patient Expects to be Discharged to[de-identified] Unknown  Current DME Used/Available at Home: Shower chair, Walker, rolling  Tub or Shower Type: Shower  Prior Level of Function/Work/Activity:  Lives with daughter who is her caregiver and assists with ADLs, mobility. Per chart review, pt has walker but does not typically use. Ambulates slowly in hallway without DME use. Number of Personal Factors/Comorbidities that affect the Plan of Care: 3+: HIGH COMPLEXITY   EXAMINATION:   Most Recent Physical Functioning:   Gross Assessment:                  Posture:     Balance:  Sitting - Static: Fair (occasional)(+)  Sitting - Dynamic: Fair (occasional)(+)  Standing - Static: Poor  Standing - Dynamic : Poor Bed Mobility:  Supine to Sit: Moderate assistance  Wheelchair Mobility:     Transfers:  Sit to Stand: Maximum assistance  Stand to Sit: Moderate assistance  Gait:  Left Side Weight Bearing: As tolerated  Speed/Manasa: Pace decreased (<100 feet/min); Shuffled; Slow  Step Length: Left shortened;Right shortened  Gait Abnormalities: Antalgic; Shuffling gait; Step to gait  Distance (ft): 3 Feet (ft)  Assistive Device: Walker, rolling  Ambulation - Level of Assistance: Moderate assistance; Additional time  Interventions: Manual cues; Safety awareness training      Body Structures Involved:  1. Eyes and Ears  2. Bones  3. Joints  4. Muscles Body Functions Affected:  1. Mental  2. Sensory/Pain  3. Neuromusculoskeletal  4. Movement Related Activities and Participation Affected:  1. General Tasks and Demands  2. Mobility  3. Domestic Life  4. Community, Social and [place]   Number of elements that affect the Plan of Care: 4+: HIGH COMPLEXITY   CLINICAL PRESENTATION:   Presentation: Evolving clinical presentation with changing clinical characteristics: MODERATE COMPLEXITY   CLINICAL DECISION MAKIN [facility] Mobility Inpatient Short Form  How much difficulty does the patient currently have. .. Unable A Lot A Little None   1. Turning over in bed (including adjusting bedclothes, sheets and blankets)? [] 1   [x] 2   [] 3   [] 4   2. Sitting down on and standing up from a chair with arms ( e.g., wheelchair, bedside commode, etc.)   [] 1   [x] 2   [] 3   [] 4   3. Moving from lying on back to sitting on the side of the bed? [] 1   [x] 2   [] 3   [] 4   How much help from another person does the patient currently need. .. Total A Lot A Little None   4. Moving to and from a bed to a chair (including a wheelchair)? [] 1   [x] 2   [] 3   [] 4   5. Need to walk in hospital room? [x] 1   [] 2   [] 3   [] 4   6. Climbing 3-5 steps with a railing? [x] 1   [] 2   [] 3   [] 4   © 2007, Trustees of [address] [place], [place] [id number], under license to [employer]. All rights reserved      Score:  Initial: 10 Most Recent: X (Date: -- )    Interpretation of Tool:  Represents activities that are increasingly more difficult (i.e. Bed mobility, Transfers, Gait). Medical Necessity:     · Patient demonstrates good rehab potential due to higher previous functional level. Reason for Services/Other Comments:  · Patient continues to demonstrate capacity to improve strength, mobility, balance, transfers, and activity tolerance which will increase independence, decrease amount of assistance required from caregiver and increase safety. Use of outcome tool(s) and clinical judgement create a POC that gives a: Questionable prediction of patient's progress: MODERATE COMPLEXITY            TREATMENT:   (In addition to Assessment/Re-Assessment sessions the following treatments were rendered)   Pre-treatment Symptoms/Complaints:  \"I want to get out of this bed. \"  Pain: Initial:   Pain Intensity 1: 0  Post Session:  0/10         Therapeutic Activity: (   30 min ):  Therapeutic activities including Bed transfers, Chair transfers and Ambulation on level ground to improve mobility, strength, balance, coordination and activity tolerance. Required moderate assist of 2 and heavy Manual cues; Safety awareness training to promote static and dynamic balance in standing and promote motor control of bilateral, lower extremity(s). Therapeutic Exercise: ( 10 min):  Exercises per grid below to improve mobility, strength and balance. Required min visual and verbal cues to promote proper body alignment, promote proper body posture and promote proper body mechanics. Date:  5/23 Date:   Date:     Activity/Exercise Seated Parameters Parameters Parameters   Heel raises X 20 B     Toe raises X 20 B     LAQ's X 20 B     Hip Flex X 20 B     Hip ABD X 20 B                           Braces/Orthotics/Lines/Etc:   · IV  · O2 Device: Room air  Treatment/Session Assessment:    · Response to Treatment: Mod A for mobility limited by pain, weakness and cognition    · Interdisciplinary Collaboration:   o Physical Therapy Assistant  o Registered Nurse  · After treatment position/precautions:   o Up in chair  o Bed alarm/tab alert on  o Bed/Chair-wheels locked  o Bed in low position  o Call light within reach  o RN notified   · Compliance with Program/Exercises: Will assess as treatment progresses  · Recommendations/Intent for next treatment session: \"Next visit will focus on advancements to more challenging activities and reduction in assistance provided\".   Total Treatment Duration:  PT Patient Time In/Time Out  Time In: 0282  Time Out: 301 [place], [place]

## 2020-05-24 NOTE — PROGRESS NOTES
CM made contact with patient daughter Xochilt Gunter) and she requested a referral be made to (1) [address] [place] [address] (2) [facility] Rehab referral has been completed. CM will continue to monitor.

## 2020-05-25 PROCEDURE — 97530 THERAPEUTIC ACTIVITIES: CPT

## 2020-05-25 PROCEDURE — 74011250637 HC RX REV CODE- 250/637: Performed by: FAMILY MEDICINE

## 2020-05-25 PROCEDURE — 97110 THERAPEUTIC EXERCISES: CPT

## 2020-05-25 PROCEDURE — 65270000029 HC RM PRIVATE

## 2020-05-25 PROCEDURE — 74011250637 HC RX REV CODE- 250/637: Performed by: ORTHOPAEDIC SURGERY

## 2020-05-25 RX ADMIN — AMLODIPINE BESYLATE 5 MG: 5 TABLET ORAL at 05:17

## 2020-05-25 RX ADMIN — METOPROLOL SUCCINATE 50 MG: 50 TABLET, EXTENDED RELEASE ORAL at 08:35

## 2020-05-25 RX ADMIN — LEVOTHYROXINE SODIUM 50 MCG: 0.05 TABLET ORAL at 05:17

## 2020-05-25 RX ADMIN — POLYETHYLENE GLYCOL 3350 17 G: 17 POWDER, FOR SOLUTION ORAL at 08:42

## 2020-05-25 RX ADMIN — POTASSIUM CHLORIDE 20 MEQ: 20 TABLET, EXTENDED RELEASE ORAL at 08:35

## 2020-05-25 RX ADMIN — Medication 1 TABLET: at 12:34

## 2020-05-25 RX ADMIN — POLYETHYLENE GLYCOL 3350 17 G: 17 POWDER, FOR SOLUTION ORAL at 23:16

## 2020-05-25 RX ADMIN — HYDROCODONE BITARTRATE AND ACETAMINOPHEN 1 TABLET: 5; 325 TABLET ORAL at 23:19

## 2020-05-25 RX ADMIN — BUSPIRONE HYDROCHLORIDE 5 MG: 5 TABLET ORAL at 08:35

## 2020-05-25 RX ADMIN — FLUOXETINE 40 MG: 20 CAPSULE ORAL at 08:35

## 2020-05-25 RX ADMIN — POLYETHYLENE GLYCOL 3350 17 G: 17 POWDER, FOR SOLUTION ORAL at 17:23

## 2020-05-25 RX ADMIN — CYPROHEPTADINE HYDROCHLORIDE 4 MG: 4 TABLET ORAL at 08:34

## 2020-05-25 RX ADMIN — ASPIRIN 325 MG ORAL TABLET 325 MG: 325 PILL ORAL at 08:42

## 2020-05-25 RX ADMIN — Medication 1 TABLET: at 08:35

## 2020-05-25 RX ADMIN — Medication 1 TABLET: at 17:23

## 2020-05-25 RX ADMIN — ACETAMINOPHEN 650 MG: 325 TABLET, FILM COATED ORAL at 08:35

## 2020-05-25 NOTE — PROGRESS NOTES
Problem: Mobility Impaired (Adult and Pediatric)  Goal: *Acute Goals and Plan of Care (Insert Text)  Description: LTG:  (1.)Ms. Denis Sandoval will move from supine to sit and sit to supine, scoot up and down and roll side to side with MINIMAL ASSIST within 7 treatment day(s). (2.)Ms. Denis Sandoval will transfer from bed to chair and chair to bed with MINIMAL ASSIST using the least restrictive device within 7 treatment day(s). (3.)Ms. Denis Sandoval will ambulate with MINIMAL ASSIST for 10 feet with the least restrictive device within 7 treatment day(s). (4.)Ms. Denis Sandoval will perform exercises per HEP for 15+ minutes to improve strength and mobility within 7 days. ________________________________________________________________________________________________   Outcome: Progressing Towards Goal     PHYSICAL THERAPY: Daily Note and AM 5/25/2020  INPATIENT: PT Visit Days : 4  Payor: Annmarie Parrish / Plan: [facility] [employer] MEDICARE CHOICE PPO/PFFS / Product Type: [employer] Medicare /       NAME/AGE/GENDER: Oren Echavarria is a 80 y.o. female   PRIMARY DIAGNOSIS: Closed left hip fracture (HCC) [S72.002A]  Constipation [K59.00] Closed left hip fracture (HCC) Closed left hip fracture (HCC)  Procedure(s) (LRB):  FEMORAL HEAD FRACTURE OPEN REDUCTION INTERNAL FIXATION Left (Left)  4 Days Post-Op  ICD-10: Treatment Diagnosis:    · Generalized Muscle Weakness (M62.81)  · Difficulty in walking, Not elsewhere classified (R26.2)  · Other abnormalities of gait and mobility (R26.89)  · History of falling (Z91.81)   Precaution/Allergies:  Patient has no known allergies. ASSESSMENT:     Ms. Denis Sandoval is a pleasantly confused 80year old female admitted from home with left hip fracture and is seen s/p ORIF. WBAT per ortho orders. Per chart review, pt lives with daughter who is her caregiver. She is typically ambulatory for household distances slowly without DME use, does have cane available.      The patient is supine in bed upon contact and agreeable to PT treatment. The patient performed bed mobility with mod-min A and additional time with verbal/tactile cues for technique and sequencing. She demonstrated good static sitting balance at the edge of the bed and scooted out with min A and verbal cues. The patient then performed sit to stand with mod A, additional time, and verbal/tactile cues for hand placement. She demonstrated fair static standing balance with the RW, but required cues for safety awareness, posture, and maintaining hands on the walker. The patient amb 3' to the recliner chair with mod A and constant cues for posture and hand placement. She required additional time and cues and moved with a shuffling/pivoting gait pattern and little step clearance. The patient sat in the recliner with min A and scooted back with CGA. The patient participated in therapeutic exercises as per the chart below to improve strength, mobility, and activity tolerance. The patient was then positioned for comfort with needs in reach. Overall the patient is making good progress toward therapy goals as indicated by increased activity tolerance and decreased assistance levels. Will continue skilled PT treatment as patient is still below functional baseline. This section established at most recent assessment   PROBLEM LIST (Impairments causing functional limitations):  1. Decreased Strength  2. Decreased Transfer Abilities  3. Decreased Ambulation Ability/Technique  4. Decreased Balance  5. Increased Pain  6. Decreased Activity Tolerance  7. Decreased Pacing Skills  8. Decreased Flexibility/Joint Mobility  9. Decreased Knowledge of Precautions  10. Decreased Skin Integrity/Hygeine  11. Decreased Cognition   INTERVENTIONS PLANNED: (Benefits and precautions of physical therapy have been discussed with the patient.)  1. Balance Exercise  2. Bed Mobility  3. Gait Training  4. Home Exercise Program (HEP)  5. Therapeutic Activites  6.  Therapeutic Exercise/Strengthening  7. Transfer Training     TREATMENT PLAN: Frequency/Duration: 3 times a week for duration of hospital stay  Rehabilitation Potential For Stated Goals: Good     REHAB RECOMMENDATIONS (at time of discharge pending progress):    Placement: It is my opinion, based on this patient's performance to date, that Ms. Mary Kay Schaefer may benefit from intensive therapy at a [address] [place] [address] after discharge due to the functional deficits listed above that are likely to improve with skilled rehabilitation and concerns that he/she may be unsafe to be unsupervised at home due to weakness, fall risk, need for heavy assistance. Equipment:    tbd pending progress              HISTORY:   History of Present Injury/Illness (Reason for Referral):  Per H&P, \"80year-old  female patient with a known history of hypertension, dementia, hypothyroidism and osteoporosis. Patient was seen yesterday in emergency room for history of fall. She had 1 more fall last night and brought to the emergency room again for further evaluation . Patient has been complaining of left hip pain.     Apart from fall and left hip pain of the 10 directions apart from the one mentioned above patient other review of systems were negative noncontributory.     WBC of 11.5, creatinine of 1.28, GFR of 50. Left hip x-ray-  Impression: Question acute nondisplaced transcervical fracture of the left  femoral neck.  MRI of the left hip without contrast may be more sensitive in  Evaluation.     Patient will be transferred to [facility] for further management of left hip fracture and constipation(based on the x-ray KUB done yesterday). \"  Past Medical History/Comorbidities:   Ms. Mary Kay Schaefer  has a past medical history of Anxiety, Hematuria, microscopic, Hypercholesteremia, Hypertension, Hypothyroidism, IBS (irritable bowel syndrome), Osteoporosis, Sciatica, Short-term memory loss, and Sleep apnea.   Ms. Mary Kay Schaefer  has a past surgical history that includes hx hysterectomy; hx svt ablation; and hx vitrectomy (Bilateral). Social History/Living Environment:   Home Environment: Private residence  Living Alone: No  Support Systems: Family member(s), Child(marv)  Patient Expects to be Discharged to[de-identified] Unknown  Current DME Used/Available at Home: Shower chair, Walker, rolling  Tub or Shower Type: Shower  Prior Level of Function/Work/Activity:  Lives with daughter who is her caregiver and assists with ADLs, mobility. Per chart review, pt has walker but does not typically use. Ambulates slowly in hallway without DME use. Number of Personal Factors/Comorbidities that affect the Plan of Care: 3+: HIGH COMPLEXITY   EXAMINATION:   Most Recent Physical Functioning:   Gross Assessment:                  Posture:     Balance:  Sitting: Intact  Sitting - Static: Good (unsupported)  Sitting - Dynamic: Good (unsupported); Fair (occasional)  Standing: Impaired;Pull to stand  Standing - Static: Fair;Poor  Standing - Dynamic : Poor Bed Mobility:  Supine to Sit: Moderate assistance  Scooting: Minimum assistance  Interventions: Verbal cues; Safety awareness training  Wheelchair Mobility:     Transfers:  Sit to Stand: Minimum assistance  Stand to Sit: Minimum assistance  Bed to Chair: Moderate assistance; Additional time  Interventions: Verbal cues; Safety awareness training; Tactile cues  Gait:     Base of Support: Shift to right  Speed/Manasa: Slow;Shuffled  Step Length: Left shortened;Right shortened  Gait Abnormalities: Antalgic;Decreased step clearance;Shuffling gait;Trunk sway increased  Distance (ft): 3 Feet (ft)  Assistive Device: Walker, rolling;Gait belt  Ambulation - Level of Assistance: Moderate assistance; Additional time  Interventions: Verbal cues; Tactile cues; Safety awareness training      Body Structures Involved:  1. Eyes and Ears  2. Bones  3. Joints  4. Muscles Body Functions Affected:  1. Mental  2. Sensory/Pain  3. Neuromusculoskeletal  4.  Movement Related Activities and Participation Affected:  1. General Tasks and Demands  2. Mobility  3. Domestic Life  4. Community, Social and [employer]   Number of elements that affect the Plan of Care: 4+: HIGH COMPLEXITY   CLINICAL PRESENTATION:   Presentation: Evolving clinical presentation with changing clinical characteristics: MODERATE COMPLEXITY   CLINICAL DECISION MAKIN [facility] Mobility Inpatient Short Form  How much difficulty does the patient currently have. .. Unable A Lot A Little None   1. Turning over in bed (including adjusting bedclothes, sheets and blankets)? [] 1   [x] 2   [] 3   [] 4   2. Sitting down on and standing up from a chair with arms ( e.g., wheelchair, bedside commode, etc.)   [] 1   [x] 2   [] 3   [] 4   3. Moving from lying on back to sitting on the side of the bed? [] 1   [x] 2   [] 3   [] 4   How much help from another person does the patient currently need. .. Total A Lot A Little None   4. Moving to and from a bed to a chair (including a wheelchair)? [] 1   [x] 2   [] 3   [] 4   5. Need to walk in hospital room? [x] 1   [] 2   [] 3   [] 4   6. Climbing 3-5 steps with a railing? [x] 1   [] 2   [] 3   [] 4   © , Trustees of [address] [place], [place] [address], under license to [employer]. All rights reserved      Score:  Initial: 10 Most Recent: X (Date: -- )    Interpretation of Tool:  Represents activities that are increasingly more difficult (i.e. Bed mobility, Transfers, Gait). Medical Necessity:     · Patient demonstrates good rehab potential due to higher previous functional level. Reason for Services/Other Comments:  · Patient continues to demonstrate capacity to improve strength, mobility, balance, transfers, and activity tolerance which will increase independence, decrease amount of assistance required from caregiver and increase safety.    Use of outcome tool(s) and clinical judgement create a POC that gives a: Questionable prediction of patient's progress: MODERATE COMPLEXITY            TREATMENT:   (In addition to Assessment/Re-Assessment sessions the following treatments were rendered)   Pre-treatment Symptoms/Complaints:  \"I can't do much\"  Pain: Initial:   Pain Intensity 1: 3  Post Session:  0/10, in chair         Therapeutic Activity: (   15 min ): Therapeutic activities including Bed transfers, Chair transfers, bed mobility, patient education, and Ambulation on level ground to improve mobility, strength, balance, coordination and activity tolerance. Required moderate assist Verbal cues; Tactile cues; Safety awareness training to promote static and dynamic balance in standing and promote motor control of bilateral, lower extremity(s). Therapeutic Exercise: (  10 min):  Exercises per grid below to improve mobility and strength. Required moderate verbal and tactile cues to promote proper body alignment, promote proper body posture and promote proper body mechanics. Progressed resistance, range and repetitions as indicated. Date:  5/23 Date:  5/25 Date:     Activity/Exercise Seated Parameters Parameters Parameters   Heel raises X 20 B x20    Toe raises X 20 B x20    LAQ's X 20 B x20    Hip Flex X 20 B x10    Hip ABD X 20 B                           Braces/Orthotics/Lines/Etc:   · O2 Device: Room air  Treatment/Session Assessment:    · Response to Treatment:  See above, moving better, poor standing balance    · Interdisciplinary Collaboration:   o Physical Therapy Assistant  o Registered Nurse  · After treatment position/precautions:   o Up in chair  o Bed alarm/tab alert on  o Bed/Chair-wheels locked  o Bed in low position  o Call light within reach  o RN notified   · Compliance with Program/Exercises: Will assess as treatment progresses  · Recommendations/Intent for next treatment session: \"Next visit will focus on advancements to more challenging activities and reduction in assistance provided\".   Total Treatment Duration:  PT Patient Time In/Time Out  Time In: 0848  Time Out: [address] [facility], [facility]

## 2020-05-25 NOTE — PROGRESS NOTES
History of Present Illness/Hospital Course:  77-year-old female presenting to the emergency department on 5/20/20 after two consecutive falls on consecutive days. In the emergency department the patient was found to have left femoral neck fracture. Orthopedic surgery was consulted. Patient had uncomplicated surgical fixation on 5/21/2020. Patient has no complaints today. Today: PT recommending short-term rehab. Case management working towards. Patient medically stable for discharge. No acute events. Patient without complaints. Comprehensive review of systems negative unless stated above. I have personally reviewed all current data for this patient. Physical Exam:  General: Elderly black female, sitting up in chair, confused, no acute distress  HEENT: NCAT, moist mucous membranes  Skin: No rash noted  Cardio: RRR, normal S1/S2, no rubs, no gallops, no murmurs  Pulm: Non labored respirations on room air, LCAB, no wheezing, no rales, no rhonchi  GI: Soft, Nt, Nd, Nml bowel sounds, no masses noted  Extremity: Left leg in brace  Neuro: Alert, oriented, moving all extremities, no focal deficits noted  Psych: Pleasant, cooperative, normal range of affect    Assessment and Plan:    #Left femoral neck fracture: Clinically unable to determine if pathologic.  -All management per orthopedic surgery    #HTN: Continue home amlodipine, metoprolol  #Depression: Continue home buspirone, fluoxetine  #Hypotoyroidism: Continue home levothyroxine    Inpatient for above, physical therapy recommend discharge to skilled nursing facility for short-term rehab but the family is refusing. Patient medically stable for discharge. Discharge planning per case management.     Full code    All VTE prophylaxis per orthopedic surgery

## 2020-05-25 NOTE — PROGRESS NOTES
Progress Note    Patient: Carly Sanchez MRN: [id number]  SSN: [id number]    YOB: 1929  Age: 80 y.o. Sex: female      Admit Date: 5/20/2020    LOS: 5 days     Subjective:     Patient reports that she has some pain in her left hip but she thinks this is helped quite significantly by the pain medication    Objective:     Vitals:    05/24/20 2007 05/24/20 2329 05/25/20 0511 05/25/20 0726   BP: 152/77 156/52 159/79 156/79   Pulse: 72 74 76 74   Resp: 18 18 18 18   Temp: 98.5 °F (36.9 °C) 98.3 °F (36.8 °C) 98.5 °F (36.9 °C) 98.5 °F (36.9 °C)   SpO2: 94% 94% 94% 94%        Intake and Output:  Current Shift: No intake/output data recorded. Last three shifts: 05/23 1901 - 05/25 0700  In: -   Out: 2500 [Urine:2500]    alert and oriented to at least person  Skin - incision is well healed with no redness or drainage  Motor and sensory function intact in LEFT LOWER extremity  Pulses palpable in LEFT LOWER extremity       Lab/Data Review:  CBC: No results found for: WBC, HGB, HGBEXT, HCT, HCTEXT, PLT, PLTEXT, HGBEXT, HCTEXT, PLTEXT       Assessment:     Acute postop blood loss anemia , POD 4 from left femoral neck ORIF    Plan:     Patient may be weightbearing as tolerated on the left lower extremity. They can be full active and passive range of motion of the left hip. They can have dry dressing change starting on postoperative day 2 and then after that daily and as needed. They will need aspirin 325 mg 1 p.o. daily for 4 weeks as DVT prophylaxis as well as SCDs while hospitalized unless they are on a anticoagulant which they take preoperatively. In this case they can restart this on postoperative day 2 they will need to have orthopedic follow-up approximately 2 weeks after discharge.     Signed By: Sammy Hurt MD     May 25, 2020

## 2020-05-26 PROCEDURE — 74011250637 HC RX REV CODE- 250/637: Performed by: ORTHOPAEDIC SURGERY

## 2020-05-26 PROCEDURE — 65270000029 HC RM PRIVATE

## 2020-05-26 PROCEDURE — 74011250637 HC RX REV CODE- 250/637: Performed by: FAMILY MEDICINE

## 2020-05-26 PROCEDURE — 97535 SELF CARE MNGMENT TRAINING: CPT

## 2020-05-26 PROCEDURE — 97530 THERAPEUTIC ACTIVITIES: CPT

## 2020-05-26 PROCEDURE — 97110 THERAPEUTIC EXERCISES: CPT

## 2020-05-26 RX ADMIN — Medication 1 TABLET: at 18:14

## 2020-05-26 RX ADMIN — POLYETHYLENE GLYCOL 3350 17 G: 17 POWDER, FOR SOLUTION ORAL at 22:47

## 2020-05-26 RX ADMIN — METOPROLOL SUCCINATE 50 MG: 50 TABLET, EXTENDED RELEASE ORAL at 08:42

## 2020-05-26 RX ADMIN — ASPIRIN 325 MG ORAL TABLET 325 MG: 325 PILL ORAL at 08:42

## 2020-05-26 RX ADMIN — CYPROHEPTADINE HYDROCHLORIDE 4 MG: 4 TABLET ORAL at 08:42

## 2020-05-26 RX ADMIN — POTASSIUM CHLORIDE 20 MEQ: 20 TABLET, EXTENDED RELEASE ORAL at 08:41

## 2020-05-26 RX ADMIN — POLYETHYLENE GLYCOL 3350 17 G: 17 POWDER, FOR SOLUTION ORAL at 18:14

## 2020-05-26 RX ADMIN — LEVOTHYROXINE SODIUM 50 MCG: 0.05 TABLET ORAL at 05:29

## 2020-05-26 RX ADMIN — AMLODIPINE BESYLATE 5 MG: 5 TABLET ORAL at 05:29

## 2020-05-26 RX ADMIN — BUSPIRONE HYDROCHLORIDE 5 MG: 5 TABLET ORAL at 08:40

## 2020-05-26 RX ADMIN — Medication 1 TABLET: at 08:42

## 2020-05-26 RX ADMIN — FLUOXETINE 40 MG: 20 CAPSULE ORAL at 08:41

## 2020-05-26 RX ADMIN — POLYETHYLENE GLYCOL 3350 17 G: 17 POWDER, FOR SOLUTION ORAL at 08:42

## 2020-05-26 RX ADMIN — Medication 1 TABLET: at 13:34

## 2020-05-26 RX ADMIN — MAGNESIUM HYDROXIDE 30 ML: 2400 SUSPENSION ORAL at 18:14

## 2020-05-26 NOTE — PROGRESS NOTES
May 26, 2020         Post Op day: 5 Days Post-Op Procedure(s) (LRB):  FEMORAL HEAD FRACTURE OPEN REDUCTION INTERNAL FIXATION Left (Left)      Admit Date: 2020  Admit Diagnosis: Closed left hip fracture ([facility]) [S72.002A]  Constipation [K59.00]       Principle Problem: Closed left hip fracture ([facility] Subjective: Doing well, No complaints, No SOB, No Chest Pain, No Nausea or Vomiting     Objective:   Vital Signs are Stable, No Acute Distress, Alert,  Dressing is Dry,  Neurovascular exam is normal.     Assessment / Plan :  Patient Active Problem List   Diagnosis Code    Closed left hip fracture ([facility] S72.002A    Essential hypertension I10    Acquired hypothyroidism E03.9    Depression F32.9    Dementia ([facility] F03.90    CKD (chronic kidney disease) stage 3, GFR 30-59 ml/min ([facility]) N18.3    Constipation K59.00      Patient Vitals for the past 8 hrs:   BP Temp Pulse Resp SpO2   20 0358 158/68 98.3 °F (36.8 °C) (!) 58 18 95 %    Temp (24hrs), Av.3 °F (36.8 °C), Min:98.1 °F (36.7 °C), Max:98.4 °F (36.9 °C)    There is no height or weight on file to calculate BMI.     Lab Results   Component Value Date/Time    HGB 9.5 (L) 2020 05:17 AM          Medical Mgmt per hospitalist  Anticoagulation plan: ASA 325mg daily   Continue PT  Fall Precuations  DC disp: rehab placement        Signed By: JODIE Husain  2020,  8:14 AM

## 2020-05-26 NOTE — PROGRESS NOTES
Problem: Self Care Deficits Care Plan (Adult)  Goal: *Acute Goals and Plan of Care (Insert Text)  Description:   1. Patient will complete lower body bathing and dressing with minimal assistance and adaptive equipment as needed. 2. Patient will complete toileting with minimal assistance. 3. Patient will tolerate 30 minutes of OT treatment with 2-3 rest breaks to increase activity tolerance for ADLs. 4. Patient will complete functional transfers with minimal assistance and adaptive equipment as needed. 5. Patient will complete functional mobility for household distances with minimal assistance and appropriate safety awareness. 5. Patient will tolerate standing sink side with minimal assistance while participating in grooming tasks. Timeframe: 7 visits      Outcome: Progressing Towards Goal     OCCUPATIONAL THERAPY: Daily Note and PM 5/26/2020  INPATIENT: OT Visit Days: 3  Payor: Rena Sutton / Plan: [employer] MEDICARE CHOICE PPO/PFFS / Product Type: [employer] Medicare /    L LE WBAT   NAME/AGE/GENDER: Li Bran is a 80 y.o. female   PRIMARY DIAGNOSIS:  Closed left hip fracture (HCC) [S72.002A]  Constipation [K59.00] Closed left hip fracture (HCC) Closed left hip fracture (HCC)  Procedure(s) (LRB):  FEMORAL HEAD FRACTURE OPEN REDUCTION INTERNAL FIXATION Left (Left)  5 Days Post-Op  ICD-10: Treatment Diagnosis:    · Pain in left hip (M25.552)  · Stiffness of Left Hip, Not elsewhere classified (M25.652)  · Generalized Muscle Weakness (M62.81)  · Other lack of cordination (R27.8)  · History of falling (Z91.81)   Precautions/Allergies:     Patient has no known allergies. ASSESSMENT:     Ms. Gallo Bonner presents to the hospital after falling and sustaining L hip fx. Pt is s/p ORIF L hip and is WBAT in the LLE. Pt is alert and sitting up in the chair upon arrival. Pt is extremely pleasant and cooperative. Pt is limited with movement due to pain in her L hip. Pt is oriented to person and time.  Pt does appear to have some memory issues with patient tending to repeat herself throughout the assessment. Pt appears to have functional upper body strength for assisting with scooting to the edge of the chair with minimal assistance. 5/26/2020 Pt found seated on commode upon arrival. Pt reports that nurse helped her to restroom. Pt with slight confusion this session and intermittently asking why she is here and what happened. Pt observed to have good static and dynamic seated balance. Pt required Min A x 2 with RW for sit to stand. Upon standing, pt with fair static and poor dynamic standing balance. Pt required Min A x 1 for balance to complete wiping with Set Up. Pt required Max A for brief management, as well as Min A x 1 to maintain standing balance. Pt required Min A x 2 with RW to walk from commode to sink to wash hands. Pt washed hands with CGA. Pt required Min A x 2 to walk from sink to bed as pt observed to have increased fatigue, and Min A x 1 to complete stand to sit. Pt required Min A x 2 for sit to supine. Pt left supine in bed with all needs met and within reach. RN notified. Will continue POC. This section established at most recent assessment   PROBLEM LIST (Impairments causing functional limitations):  1. Decreased Strength  2. Decreased ADL/Functional Activities  3. Decreased Transfer Abilities  4. Decreased Ambulation Ability/Technique  5. Decreased Balance  6. Increased Pain  7. Decreased Activity Tolerance  8. Decreased Flexibility/Joint Mobility  9. Decreased [place] with Home Exercise Program  10. Decreased Cognition   INTERVENTIONS PLANNED: (Benefits and precautions of occupational therapy have been discussed with the patient.)  1. Activities of daily living training  2. Adaptive equipment training  3. Balance training  4. Clothing management  5. Cognitive training  6. Donning&doffing training  7. Neuromuscular re-eduation  8. Therapeutic activity  9.  Therapeutic exercise TREATMENT PLAN: Frequency/Duration: Follow patient 6 times per week to address above goals. Rehabilitation Potential For Stated Goals: Good     REHAB RECOMMENDATIONS (at time of discharge pending progress):    Placement: It is my opinion, based on this patient's performance to date, that Ms. Denis Sandoval may benefit from intensive therapy at a [address] [place] [address] after discharge due to the functional deficits listed above that are likely to improve with skilled rehabilitation and concerns that he/she may be unsafe to be unsupervised at home due to risk for falls and further functional decline. Equipment:    TBD               OCCUPATIONAL PROFILE AND HISTORY:   History of Present Injury/Illness (Reason for Referral):  See H&P  Past Medical History/Comorbidities:   Ms. Denis Sandoval  has a past medical history of Anxiety, Hematuria, microscopic, Hypercholesteremia, Hypertension, Hypothyroidism, IBS (irritable bowel syndrome), Osteoporosis, Sciatica, Short-term memory loss, and Sleep apnea. Ms. Denis Sandoval  has a past surgical history that includes hx hysterectomy; hx svt ablation; and hx vitrectomy (Bilateral). Social History/Living Environment:   Home Environment: Private residence  Living Alone: No  Support Systems: Family member(s), Child(marv)  Patient Expects to be Discharged to[de-identified] Unknown  Current DME Used/Available at Home: Shower chair, Walker, rolling  Tub or Shower Type: Shower  Prior Level of Function/Work/Activity:  Pt lives at home with her daughter that assists with her care. Pt reports she uses a rolling walker on occasion. Pt completes her own toileting needs and states she can dress herself. Pt completes a sponge bath or her daughter assist her into the shower. Personal Factors:          Age:  [address] y.o.         Past/Current Experience:  recent fall and s/p L Hip ORIF           Number of Personal Factors/Comorbidities that affect the Plan of Care: Expanded review of therapy/medical records (1-2):  MODERATE COMPLEXITY   ASSESSMENT OF OCCUPATIONAL PERFORMANCE[de-identified]   Activities of Daily Living:   Basic ADLs (From Assessment) Complex ADLs (From Assessment)   Feeding: Stand-by assistance  Oral Facial Hygiene/Grooming: Minimum assistance  Bathing: Moderate assistance  Upper Body Dressing: Minimum assistance  Lower Body Dressing: Maximum assistance  Toileting: Maximum assistance Instrumental ADL  Meal Preparation: Total assistance  Homemaking: Total assistance   Grooming/Bathing/Dressing Activities of Daily Living   Grooming  Grooming Assistance: Contact guard assistance  Position Performed: Standing  Washing Hands: Contact guard assistance             Toileting  Toileting Assistance: Maximum assistance     Functional Transfers  Bathroom Mobility: Minimum assistance(Assist x 2)     Bed/Mat Mobility  Sit to Supine: Contact guard assistance;Minimum assistance  Sit to Stand: Contact guard assistance  Stand to Sit: Minimum assistance  Bed to Chair: Minimum assistance;Assist x2  Scooting: Stand-by assistance     Most Recent Physical Functioning:   Gross Assessment:                  Posture:  Posture (WDL): Exceptions to WDL  Posture Assessment: Forward head, Rounded shoulders, Trunk flexion  Balance:  Sitting: Intact  Sitting - Static: Good (unsupported)  Sitting - Dynamic: Good (unsupported)  Standing: Impaired  Standing - Static: Fair  Standing - Dynamic : Fair Bed Mobility:  Sit to Supine: Contact guard assistance;Minimum assistance  Scooting: Stand-by assistance  Interventions: Verbal cues  Wheelchair Mobility:     Transfers:  Sit to Stand: Contact guard assistance  Stand to Sit: Minimum assistance  Bed to Chair: Minimum assistance;Assist x2  Toilet Transfer : Minimum assistance;Assist x2  Interventions: Verbal cues; Safety awareness training            Patient Vitals for the past 6 hrs:   BP BP Patient Position SpO2 Pulse   05/26/20 1210 156/74 Sitting 94 % 66   05/26/20 1640 110/62 At rest 95 % 67       Mental Status  Neurologic State: Alert  Orientation Level: Oriented to person, Disoriented to place, Disoriented to situation, Disoriented to time  Cognition: Decreased attention/concentration, Follows commands, Memory loss  Perception: Verbal, Tactile  Perseveration: Verbal cues provided, Tactile cues provided  Safety/Judgement: Fall prevention                          Physical Skills Involved:  1. Range of Motion  2. Balance  3. Strength  4. Activity Tolerance  5. Pain (acute) Cognitive Skills Affected (resulting in the inability to perform in a timely and safe manner):  1. Executive Function  2. Short Term Recall Psychosocial Skills Affected:  1. Habits/Routines  2. Environmental Adaptation  3. Self-Awareness   Number of elements that affect the Plan of Care: 5+:  HIGH COMPLEXITY   CLINICAL DECISION MAKIN[address] [place], [place] [id number] AM-PAC 6 Clicks   Daily Activity Inpatient Short Form  How much help from another person does the patient currently need. .. Total A Lot A Little None   1. Putting on and taking off regular lower body clothing? [] 1   [x] 2   [] 3   [] 4   2. Bathing (including washing, rinsing, drying)? [] 1   [x] 2   [] 3   [] 4   3. Toileting, which includes using toilet, bedpan or urinal?   [] 1   [x] 2   [] 3   [] 4   4. Putting on and taking off regular upper body clothing? [] 1   [] 2   [x] 3   [] 4   5. Taking care of personal grooming such as brushing teeth? [] 1   [] 2   [x] 3   [] 4   6. Eating meals? [] 1   [] 2   [x] 3   [] 4   © , Trustees of [address] [place], [place] [id number], under license to [employer]. All rights reserved      Score:  Initial: 15 Most Recent: X (Date: -- )    Interpretation of Tool:  Represents activities that are increasingly more difficult (i.e. Bed mobility, Transfers, Gait). Medical Necessity:     · Patient demonstrates   · good and excellent  ·  rehab potential due to higher previous functional level.   Reason for Services/Other Comments:  · Patient continues to require skilled intervention due to   · Decreased independence with ADL/functional transfers. · .   Use of outcome tool(s) and clinical judgement create a POC that gives a: LOW COMPLEXITY         TREATMENT:   (In addition to Assessment/Re-Assessment sessions the following treatments were rendered)     Pre-treatment Symptoms/Complaints:  Pt with no complaints prior to or following functional mobility. Pain: Initial:   Pain Intensity 1: 0  Post Session:  Unchanged     Self Care: (23 minutes): Procedure(s) utilized to improve and/or restore self-care/home management as related to dressing and bathing. Required mod  verbal and manual cueing to facilitate activities of daily living skills. Pt found on commode at start of session. Pt reports that nurse helped her to restroom. Pt required Min A x 1 for balance to complete wiping with Set Up. Pt required Max A for brief management, as well as Min A x 1 to maintain standing balance. Pt required Min A x 2 with RW to walk from commode to sink to wash hands. Pt washed hands with CGA. Braces/Orthotics/Lines/Etc:   · O2 Device: Room air  Treatment/Session Assessment:    · Response to Treatment:  Pt with decreased pain this session. · Interdisciplinary Collaboration:   o Occupational Therapist  o Registered Nurse  o Rehabilitation Attendant  · After treatment position/precautions:   o Supine in bed  o Bed alarm/tab alert on  o Bed/Chair-wheels locked  o Bed in low position  o Call light within reach  o RN notified   · Compliance with Program/Exercises: Compliant most of the time, Will assess as treatment progresses. · Recommendations/Intent for next treatment session: \"Next visit will focus on advancements to more challenging activities and reduction in assistance provided\".   Total Treatment Duration:  OT Patient Time In/Time Out  Time In: 1537  Time Out: 1600     LISA Lujan/HAIM

## 2020-05-26 NOTE — PROGRESS NOTES
Problem: Mobility Impaired (Adult and Pediatric)  Goal: *Acute Goals and Plan of Care (Insert Text)  Description: LTG:  (1.)Ms. Justina Graves will move from supine to sit and sit to supine, scoot up and down and roll side to side with MINIMAL ASSIST within 7 treatment day(s). (2.)Ms. Justina Graves will transfer from bed to chair and chair to bed with MINIMAL ASSIST using the least restrictive device within 7 treatment day(s). (3.)Ms. Justina Graves will ambulate with MINIMAL ASSIST for 10 feet with the least restrictive device within 7 treatment day(s). (4.)Ms. Justina Graves will perform exercises per HEP for 15+ minutes to improve strength and mobility within 7 days. ________________________________________________________________________________________________   Outcome: Progressing Towards Goal     PHYSICAL THERAPY: Daily Note and AM 2020  INPATIENT: PT Visit Days : 5  Payor: Jeb Gitelman / Plan: [id number] PPO/PFFS / Product Type: [employer] Medicare /       NAME/AGE/GENDER: Christina Soares is a 80 y.o. female   PRIMARY DIAGNOSIS: Closed left hip fracture (HCC) [S72.002A]  Constipation [K59.00] Closed left hip fracture (HCC) Closed left hip fracture (HCC)  Procedure(s) (LRB):  FEMORAL HEAD FRACTURE OPEN REDUCTION INTERNAL FIXATION Left (Left)  5 Days Post-Op  ICD-10: Treatment Diagnosis:    · Generalized Muscle Weakness (M62.81)  · Difficulty in walking, Not elsewhere classified (R26.2)  · Other abnormalities of gait and mobility (R26.89)  · History of falling (Z91.81)   Precaution/Allergies:  Patient has no known allergies. ASSESSMENT:     Ms. Justina Graves is a pleasantly confused 80year old female admitted from home with left hip fracture and is seen s/p ORIF. WBAT per ortho orders. Per chart review, pt lives with daughter who is her caregiver. She is typically ambulatory for household distances slowly without DME use, does have cane available.      The patient is standing at the sink with 3 PCTs upon contact and agreeable to PT treatment. The patient sat in the chair with min A and took a seated rest break while donning gown and gait belt. The patient then stood with min A and verbal cueing for hand placement and technique. She attempted to ambulate with HHA stating she didn't need the walker, but then agreed to use it. She ambulated 8' w/ RW and min Ax2 with verbal cues for posture, walker management, and gait mechanics. The patient then sat in the recliner chair with CGA and was positioned for comfort. The patient participated in therapeutic exercises as per the chart below to improve strength, mobility, and activity tolerance. Overall the patient is making good progress toward therapy goals as indicated by increased gait distances and decreased assistance levels. Will continue skilled PT treatment as patient is still below functional baseline. This section established at most recent assessment   PROBLEM LIST (Impairments causing functional limitations):  1. Decreased Strength  2. Decreased Transfer Abilities  3. Decreased Ambulation Ability/Technique  4. Decreased Balance  5. Increased Pain  6. Decreased Activity Tolerance  7. Decreased Pacing Skills  8. Decreased Flexibility/Joint Mobility  9. Decreased Knowledge of Precautions  10. Decreased Skin Integrity/Hygeine  11. Decreased Cognition   INTERVENTIONS PLANNED: (Benefits and precautions of physical therapy have been discussed with the patient.)  1. Balance Exercise  2. Bed Mobility  3. Gait Training  4. Home Exercise Program (HEP)  5. Therapeutic Activites  6. Therapeutic Exercise/Strengthening  7. Transfer Training     TREATMENT PLAN: Frequency/Duration: 3 times a week for duration of hospital stay  Rehabilitation Potential For Stated Goals: Good     REHAB RECOMMENDATIONS (at time of discharge pending progress):    Placement: It is my opinion, based on this patient's performance to date, that Ms. Gregory Danielson may benefit from intensive therapy at a [facility] after discharge due to the functional deficits listed above that are likely to improve with skilled rehabilitation and concerns that he/she may be unsafe to be unsupervised at home due to weakness, fall risk, need for heavy assistance. Equipment:    tbd pending progress              HISTORY:   History of Present Injury/Illness (Reason for Referral):  Per H&P, \"80year-old  female patient with a known history of hypertension, dementia, hypothyroidism and osteoporosis. Patient was seen yesterday in emergency room for history of fall. She had 1 more fall last night and brought to the emergency room again for further evaluation . Patient has been complaining of left hip pain.     Apart from fall and left hip pain of the 10 directions apart from the one mentioned above patient other review of systems were negative noncontributory.     WBC of 11.5, creatinine of 1.28, GFR of 50. Left hip x-ray-  Impression: Question acute nondisplaced transcervical fracture of the left  femoral neck.  MRI of the left hip without contrast may be more sensitive in  Evaluation.     Patient will be transferred to [facility] for further management of left hip fracture and constipation(based on the x-ray KUB done yesterday). \"  Past Medical History/Comorbidities:   Ms. Reed Strauss  has a past medical history of Anxiety, Hematuria, microscopic, Hypercholesteremia, Hypertension, Hypothyroidism, IBS (irritable bowel syndrome), Osteoporosis, Sciatica, Short-term memory loss, and Sleep apnea. Ms. Reed Strauss  has a past surgical history that includes hx hysterectomy; hx svt ablation; and hx vitrectomy (Bilateral).   Social History/Living Environment:   Home Environment: Private residence  Living Alone: No  Support Systems: Family member(s), Child(marv)  Patient Expects to be Discharged to[de-identified] Unknown  Current DME Used/Available at Home: Shower chair, Walker, rolling  Tub or Shower Type: Shower  Prior Level of Function/Work/Activity:  Lives with daughter who is her caregiver and assists with ADLs, mobility. Per chart review, pt has walker but does not typically use. Ambulates slowly in hallway without DME use. Number of Personal Factors/Comorbidities that affect the Plan of Care: 3+: HIGH COMPLEXITY   EXAMINATION:   Most Recent Physical Functioning:   Gross Assessment:                  Posture:     Balance:  Sitting: Intact  Standing: Impaired  Standing - Static: Fair  Standing - Dynamic : Poor Bed Mobility:  Scooting: Stand-by assistance  Interventions: Verbal cues  Wheelchair Mobility:     Transfers:  Sit to Stand: Minimum assistance  Stand to Sit: Contact guard assistance  Bed to Chair: Minimum assistance;Assist x2  Interventions: Verbal cues; Safety awareness training  Gait:     Base of Support: Shift to right  Speed/Manasa: Slow;Shuffled  Step Length: Right shortened;Left shortened  Gait Abnormalities: Antalgic;Decreased step clearance;Trunk sway increased; Step to gait  Distance (ft): 8 Feet (ft)  Assistive Device: Walker, rolling;Gait belt  Ambulation - Level of Assistance: Minimal assistance;Assist x2  Interventions: Verbal cues; Safety awareness training      Body Structures Involved:  1. Eyes and Ears  2. Bones  3. Joints  4. Muscles Body Functions Affected:  1. Mental  2. Sensory/Pain  3. Neuromusculoskeletal  4. Movement Related Activities and Participation Affected:  1. General Tasks and Demands  2. Mobility  3. Domestic Life  4. Community, Social and [place]   Number of elements that affect the Plan of Care: 4+: HIGH COMPLEXITY   CLINICAL PRESENTATION:   Presentation: Evolving clinical presentation with changing clinical characteristics: MODERATE COMPLEXITY   CLINICAL DECISION MAKIN [facility] Inpatient Short Form  How much difficulty does the patient currently have. .. Unable A Lot A Little None   1.   Turning over in bed (including adjusting bedclothes, sheets and blankets)? [] 1   [x] 2   [] 3   [] 4   2. Sitting down on and standing up from a chair with arms ( e.g., wheelchair, bedside commode, etc.)   [] 1   [x] 2   [] 3   [] 4   3. Moving from lying on back to sitting on the side of the bed? [] 1   [x] 2   [] 3   [] 4   How much help from another person does the patient currently need. .. Total A Lot A Little None   4. Moving to and from a bed to a chair (including a wheelchair)? [] 1   [x] 2   [] 3   [] 4   5. Need to walk in hospital room? [x] 1   [] 2   [] 3   [] 4   6. Climbing 3-5 steps with a railing? [x] 1   [] 2   [] 3   [] 4   © 2007, Trustees of [address] [place] [address], under license to [employer]. All rights reserved      Score:  Initial: 10 Most Recent: X (Date: -- )    Interpretation of Tool:  Represents activities that are increasingly more difficult (i.e. Bed mobility, Transfers, Gait). Medical Necessity:     · Patient demonstrates good rehab potential due to higher previous functional level. Reason for Services/Other Comments:  · Patient continues to demonstrate capacity to improve strength, mobility, balance, transfers, and activity tolerance which will increase independence, decrease amount of assistance required from caregiver and increase safety. Use of outcome tool(s) and clinical judgement create a POC that gives a: Questionable prediction of patient's progress: MODERATE COMPLEXITY            TREATMENT:   (In addition to Assessment/Re-Assessment sessions the following treatments were rendered)   Pre-treatment Symptoms/Complaints:  \"I can't do much\"  Pain: Initial:   Pain Intensity 1: 0  Post Session:  0/10         Therapeutic Activity: (   15 min ): Therapeutic activities including  Chair transfers, patient education, static/dynamic standing balance, and Ambulation on level ground to improve mobility, strength, balance, coordination and activity tolerance. Required minimal assist Verbal cues; Safety awareness training to promote static and dynamic balance in standing and promote motor control of bilateral, lower extremity(s). Therapeutic Exercise: ( 10  min):  Exercises per grid below to improve mobility and strength. Required moderate verbal and tactile cues to promote proper body alignment, promote proper body posture and promote proper body mechanics. Progressed resistance, range and repetitions as indicated. Date:  5/23 Date:  5/25 Date:  5/26   Activity/Exercise Seated Parameters Parameters Parameters   Heel raises X 20 B x20 x20   Toe raises X 20 B x20 x20   LAQ's X 20 B x20 2x10   Hip Flex X 20 B x10 2x10   Hip ABD X 20 B     Sit to stands   x5                   Braces/Orthotics/Lines/Etc:   · O2 Device: Room air  Treatment/Session Assessment:    · Response to Treatment:  See above, poor standing balance/safety awareness    · Interdisciplinary Collaboration:   o Physical Therapy Assistant  o Registered Nurse  o Certified Nursing Assistant/Patient Care Technician  · After treatment position/precautions:   o Up in chair  o Bed alarm/tab alert on  o Bed/Chair-wheels locked  o Bed in low position  o Call light within reach  o RN notified   · Compliance with Program/Exercises: Will assess as treatment progresses  · Recommendations/Intent for next treatment session: \"Next visit will focus on advancements to more challenging activities and reduction in assistance provided\".   Total Treatment Duration:  PT Patient Time In/Time Out  Time In: 1056  Time Out: Alexandrea Lundberg 1841, PTA

## 2020-05-26 NOTE — PROGRESS NOTES
Problem: Mobility Impaired (Adult and Pediatric)  Goal: *Acute Goals and Plan of Care (Insert Text)  Description: LTG:  (1.)Ms. Xochitl Nguyen will move from supine to sit and sit to supine, scoot up and down and roll side to side with MINIMAL ASSIST within 7 treatment day(s). (2.)Ms. Xochitl Nguyen will transfer from bed to chair and chair to bed with MINIMAL ASSIST using the least restrictive device within 7 treatment day(s). (3.)Ms. Xochitl Nguyen will ambulate with MINIMAL ASSIST for 10 feet with the least restrictive device within 7 treatment day(s). (4.)Ms. Xochitl Nguyen will perform exercises per HEP for 15+ minutes to improve strength and mobility within 7 days. ________________________________________________________________________________________________   Outcome: Progressing Towards Goal     PHYSICAL THERAPY: Daily Note and PM 5/26/2020  INPATIENT: PT Visit Days : 5  Payor: Brittany David / Plan: [id number] PPO/PFFS / Product Type: [employer] /       NAME/AGE/GENDER: Robert Moss is a 80 y.o. female   PRIMARY DIAGNOSIS: Closed left hip fracture (HCC) [S72.002A]  Constipation [K59.00] Closed left hip fracture (HCC) Closed left hip fracture (HCC)  Procedure(s) (LRB):  FEMORAL HEAD FRACTURE OPEN REDUCTION INTERNAL FIXATION Left (Left)  5 Days Post-Op  ICD-10: Treatment Diagnosis:    · Generalized Muscle Weakness (M62.81)  · Difficulty in walking, Not elsewhere classified (R26.2)  · Other abnormalities of gait and mobility (R26.89)  · History of falling (Z91.81)   Precaution/Allergies:  Patient has no known allergies. ASSESSMENT:     Ms. Xochitl Nguyen is a pleasantly confused 80year old female admitted from home with left hip fracture and is seen s/p ORIF. WBAT per ortho orders. Per chart review, pt lives with daughter who is her caregiver. She is typically ambulatory for household distances slowly without DME use, does have cane available.      The patient is seated in the recliner chair upon contact and agreeable to PT treatment. She scooted forward in the chair with SBA and performed transfers with CGA. She ambulated 10' into the BR and sat on the toilet. She ambulated with a slow steady pace and an antalgic gait pattern with the RW and verbal cues for safety awareness. She demonstrated good static/dynamic sitting balance on the toilet with rocking/weight shifting during self-care. The patient was left seated on the toilet with call light in reach and nursing notified. Overall the patient is making good progress toward therapy goals as indicated by increased gait distances. Will continue skilled PT treatment as patient is still below functional baseline. This section established at most recent assessment   PROBLEM LIST (Impairments causing functional limitations):  1. Decreased Strength  2. Decreased Transfer Abilities  3. Decreased Ambulation Ability/Technique  4. Decreased Balance  5. Increased Pain  6. Decreased Activity Tolerance  7. Decreased Pacing Skills  8. Decreased Flexibility/Joint Mobility  9. Decreased Knowledge of Precautions  10. Decreased Skin Integrity/Hygeine  11. Decreased Cognition   INTERVENTIONS PLANNED: (Benefits and precautions of physical therapy have been discussed with the patient.)  1. Balance Exercise  2. Bed Mobility  3. Gait Training  4. Home Exercise Program (HEP)  5. Therapeutic Activites  6. Therapeutic Exercise/Strengthening  7. Transfer Training     TREATMENT PLAN: Frequency/Duration: 3 times a week for duration of hospital stay  Rehabilitation Potential For Stated Goals: Good     REHAB RECOMMENDATIONS (at time of discharge pending progress):    Placement: It is my opinion, based on this patient's performance to date, that Ms. Harish Mclain may benefit from intensive therapy at a [address] [place] [address] after discharge due to the functional deficits listed above that are likely to improve with skilled rehabilitation and concerns that he/she may be unsafe to be unsupervised at home due to weakness, fall risk, need for heavy assistance. Equipment:    tbd pending progress              HISTORY:   History of Present Injury/Illness (Reason for Referral):  Per H&P, \"80year-old  female patient with a known history of hypertension, dementia, hypothyroidism and osteoporosis. Patient was seen yesterday in emergency room for history of fall. She had 1 more fall last night and brought to the emergency room again for further evaluation . Patient has been complaining of left hip pain.     Apart from fall and left hip pain of the 10 directions apart from the one mentioned above patient other review of systems were negative noncontributory.     WBC of 11.5, creatinine of 1.28, GFR of 50. Left hip x-ray-  Impression: Question acute nondisplaced transcervical fracture of the left  femoral neck.  MRI of the left hip without contrast may be more sensitive in  Evaluation.     Patient will be transferred to [facility] for further management of left hip fracture and constipation(based on the x-ray KUB done yesterday). \"  Past Medical History/Comorbidities:   Ms. Gloria Lopez  has a past medical history of Anxiety, Hematuria, microscopic, Hypercholesteremia, Hypertension, Hypothyroidism, IBS (irritable bowel syndrome), Osteoporosis, Sciatica, Short-term memory loss, and Sleep apnea. Ms. Gloria Lopez  has a past surgical history that includes hx hysterectomy; hx svt ablation; and hx vitrectomy (Bilateral). Social History/Living Environment:   Home Environment: Private residence  Living Alone: No  Support Systems: Family member(s), Child(marv)  Patient Expects to be Discharged to[de-identified] Unknown  Current DME Used/Available at Home: Shower chair, Walker, rolling  Tub or Shower Type: Shower  Prior Level of Function/Work/Activity:  Lives with daughter who is her caregiver and assists with ADLs, mobility. Per chart review, pt has walker but does not typically use.  Ambulates slowly in hallway without DME use. Number of Personal Factors/Comorbidities that affect the Plan of Care: 3+: HIGH COMPLEXITY   EXAMINATION:   Most Recent Physical Functioning:   Gross Assessment:                  Posture:     Balance:  Sitting: Intact  Standing: Impaired  Standing - Static: Fair  Standing - Dynamic : Fair Bed Mobility:  Sit to Supine: Contact guard assistance;Minimum assistance  Scooting: Stand-by assistance  Interventions: Verbal cues  Wheelchair Mobility:     Transfers:  Sit to Stand: Contact guard assistance  Stand to Sit: Contact guard assistance  Bed to Chair: Minimum assistance;Assist x2  Interventions: Verbal cues; Safety awareness training  Gait:     Base of Support: Shift to right  Speed/Manasa: Slow;Shuffled  Step Length: Right shortened;Left shortened  Gait Abnormalities: Antalgic;Decreased step clearance;Trunk sway increased; Step to gait  Distance (ft): 10 Feet (ft)  Assistive Device: Walker, rolling;Gait belt  Ambulation - Level of Assistance: Minimal assistance;Assist x2  Interventions: Verbal cues; Safety awareness training      Body Structures Involved:  1. Eyes and Ears  2. Bones  3. Joints  4. Muscles Body Functions Affected:  1. Mental  2. Sensory/Pain  3. Neuromusculoskeletal  4. Movement Related Activities and Participation Affected:  1. General Tasks and Demands  2. Mobility  3. Domestic Life  4. Community, Social and [place]   Number of elements that affect the Plan of Care: 4+: HIGH COMPLEXITY   CLINICAL PRESENTATION:   Presentation: Evolving clinical presentation with changing clinical characteristics: MODERATE COMPLEXITY   CLINICAL DECISION MAKIN [facility] Mobility Inpatient Short Form  How much difficulty does the patient currently have. .. Unable A Lot A Little None   1. Turning over in bed (including adjusting bedclothes, sheets and blankets)? [] 1   [x] 2   [] 3   [] 4   2.   Sitting down on and standing up from a chair with arms ( e.g., wheelchair, bedside commode, etc.)   [] 1   [x] 2   [] 3   [] 4   3. Moving from lying on back to sitting on the side of the bed? [] 1   [x] 2   [] 3   [] 4   How much help from another person does the patient currently need. .. Total A Lot A Little None   4. Moving to and from a bed to a chair (including a wheelchair)? [] 1   [x] 2   [] 3   [] 4   5. Need to walk in hospital room? [x] 1   [] 2   [] 3   [] 4   6. Climbing 3-5 steps with a railing? [x] 1   [] 2   [] 3   [] 4   © 2007, Trustees of [address] [place] [address] [address] [address], under license to [employer]. All rights reserved      Score:  Initial: 10 Most Recent: X (Date: -- )    Interpretation of Tool:  Represents activities that are increasingly more difficult (i.e. Bed mobility, Transfers, Gait). Medical Necessity:     · Patient demonstrates good rehab potential due to higher previous functional level. Reason for Services/Other Comments:  · Patient continues to demonstrate capacity to improve strength, mobility, balance, transfers, and activity tolerance which will increase independence, decrease amount of assistance required from caregiver and increase safety. Use of outcome tool(s) and clinical judgement create a POC that gives a: Questionable prediction of patient's progress: MODERATE COMPLEXITY            TREATMENT:   (In addition to Assessment/Re-Assessment sessions the following treatments were rendered)   Pre-treatment Symptoms/Complaints:  \"I can't do much\"  Pain: Initial:   Pain Intensity 1: 0  Post Session:  0/10         Therapeutic Activity: (   25 min ): Therapeutic activities including  Chair transfers, patient education, static/dynamic standing balance, and Ambulation on level ground to improve mobility, strength, balance, coordination and activity tolerance. Required minimal assist Verbal cues; Safety awareness training to promote static and dynamic balance in standing and promote motor control of bilateral, lower extremity(s).      Therapeutic Exercise: (   min):  Exercises per grid below to improve mobility and strength. Required moderate verbal and tactile cues to promote proper body alignment, promote proper body posture and promote proper body mechanics. Progressed resistance, range and repetitions as indicated. Date:  5/23 Date:  5/25 Date:  5/26   Activity/Exercise Seated Parameters Parameters Parameters   Heel raises X 20 B x20 x20   Toe raises X 20 B x20 x20   LAQ's X 20 B x20 2x10   Hip Flex X 20 B x10 2x10   Hip ABD X 20 B     Sit to stands   x5                   Braces/Orthotics/Lines/Etc:   · O2 Device: Room air  Treatment/Session Assessment:    · Response to Treatment:  See above    · Interdisciplinary Collaboration:   o Physical Therapy Assistant  o Registered Nurse  · After treatment position/precautions:   o Bed/Chair-wheels locked  o Bed in low position  o Call light within reach  o RN notified  o Seated on toilet   · Compliance with Program/Exercises: Will assess as treatment progresses  · Recommendations/Intent for next treatment session: \"Next visit will focus on advancements to more challenging activities and reduction in assistance provided\".   Total Treatment Duration:  PT Patient Time In/Time Out  Time In: 1503  Time Out: 350 [address]

## 2020-05-26 NOTE — PROGRESS NOTES
[facility] does not accept the pt's insurance. Pt has been accepted for admission to [facility] pending insurance approval and a negative COVID test.  SW initiated insurance precert today. COVID test ordered today. SW notified pt's DTR of bed offer and plan and she is in agreement. [facility] does not anticipate that the pt will be able to transfer to rehab until Wednesday. [facility] following to facilitate pt's transfer to rehab at [place]. 1215: Insurance approval received. Auth # [id number]. Pt can transfer to rehab as soon as COVID test results are available and pt is medically cleared for [facility].

## 2020-05-26 NOTE — PROGRESS NOTES
Progress Note    Patient: Magda Merrill MRN: [id number]  SSN: [id number]    YOB: 1929  Age: 80 y.o. Sex: female      Admit Date: 5/20/2020    LOS: 6 days     Subjective:     Seen and examined, no acute events last night. Objective:     Vitals:    05/26/20 0358 05/26/20 0818 05/26/20 1210 05/26/20 1640   BP: 158/68 146/76 156/74 110/62   Pulse: (!) 58 69 66 67   Resp: 18 18 18 18   Temp: 98.3 °F (36.8 °C) 97.5 °F (36.4 °C) 98.3 °F (36.8 °C) 98.2 °F (36.8 °C)   SpO2: 95% 95% 94% 95%        Intake and Output:  Current Shift: No intake/output data recorded. Last three shifts: 05/24 1901 - 05/26 0700  In: -   Out: 1050 [Urine:1050]    Physical Exam:   GENERAL: alert, cooperative, no distress, appears stated age  LUNG: clear to auscultation bilaterally  HEART: regular rate and rhythm, S1, S2 normal, no murmur, click, rub or gallop  ABDOMEN: soft, non-tender. Bowel sounds normal. No masses,  no organomegaly  EXTREMITIES:  extremities normal, atraumatic, no cyanosis or edema  S/p left femoral neck surgery, dressing in place, no oozing blood      Lab/Data Review: All lab results for the last 24 hours reviewed.          Assessment:     Principal Problem:    [facility] ([facility] (5/20/2020)    Active Problems:    Essential hypertension (5/20/2020)      Acquired hypothyroidism (5/20/2020)      Depression (5/20/2020)      Dementia ([facility] (5/20/2020)      CKD (chronic kidney disease) stage 3, GFR 30-59 ml/min ([facility]) (5/20/2020)      Constipation (5/20/2020)        Plan:     Left femoral neck fracture:  S/p repair  Pending placement       HTN: Continue home amlodipine, metoprolol  Depression: Continue home buspirone, fluoxetine  Hypotoyroidism: Continue home levothyroxine          Signed By: Juan José Ramos MD     May 26, 2020

## 2020-05-27 PROCEDURE — 74011250637 HC RX REV CODE- 250/637: Performed by: FAMILY MEDICINE

## 2020-05-27 PROCEDURE — 65270000029 HC RM PRIVATE

## 2020-05-27 PROCEDURE — 97530 THERAPEUTIC ACTIVITIES: CPT

## 2020-05-27 PROCEDURE — 74011250637 HC RX REV CODE- 250/637: Performed by: ORTHOPAEDIC SURGERY

## 2020-05-27 PROCEDURE — 97535 SELF CARE MNGMENT TRAINING: CPT

## 2020-05-27 RX ADMIN — CYPROHEPTADINE HYDROCHLORIDE 4 MG: 4 TABLET ORAL at 08:28

## 2020-05-27 RX ADMIN — Medication 1 TABLET: at 11:49

## 2020-05-27 RX ADMIN — HYDROCODONE BITARTRATE AND ACETAMINOPHEN 2 TABLET: 5; 325 TABLET ORAL at 21:31

## 2020-05-27 RX ADMIN — POLYETHYLENE GLYCOL 3350 17 G: 17 POWDER, FOR SOLUTION ORAL at 16:53

## 2020-05-27 RX ADMIN — AMLODIPINE BESYLATE 5 MG: 5 TABLET ORAL at 05:40

## 2020-05-27 RX ADMIN — BUSPIRONE HYDROCHLORIDE 5 MG: 5 TABLET ORAL at 08:28

## 2020-05-27 RX ADMIN — METOPROLOL SUCCINATE 50 MG: 50 TABLET, EXTENDED RELEASE ORAL at 08:29

## 2020-05-27 RX ADMIN — LEVOTHYROXINE SODIUM 50 MCG: 0.05 TABLET ORAL at 05:41

## 2020-05-27 RX ADMIN — POLYETHYLENE GLYCOL 3350 17 G: 17 POWDER, FOR SOLUTION ORAL at 21:31

## 2020-05-27 RX ADMIN — POTASSIUM CHLORIDE 20 MEQ: 20 TABLET, EXTENDED RELEASE ORAL at 08:28

## 2020-05-27 RX ADMIN — ASPIRIN 325 MG ORAL TABLET 325 MG: 325 PILL ORAL at 08:29

## 2020-05-27 RX ADMIN — Medication 1 TABLET: at 08:28

## 2020-05-27 RX ADMIN — POLYETHYLENE GLYCOL 3350 17 G: 17 POWDER, FOR SOLUTION ORAL at 08:32

## 2020-05-27 RX ADMIN — FLUOXETINE 40 MG: 20 CAPSULE ORAL at 08:28

## 2020-05-27 RX ADMIN — Medication 1 TABLET: at 16:53

## 2020-05-27 NOTE — PROGRESS NOTES
Progress Note    Patient: Dragan High MRN: [id number]  SSN: [id number]    YOB: 1929  Age: 80 y.o. Sex: female      Admit Date: 5/20/2020    LOS: 7 days     Subjective:     Seen and examined, no acute events, pending placement    Objective:     Vitals:    05/27/20 0540 05/27/20 0737 05/27/20 1146 05/27/20 1601   BP: 183/84 156/77 116/72 124/62   Pulse: 73 70 69 67   Resp: 17 17 17 17   Temp: 98.4 °F (36.9 °C) 98 °F (36.7 °C) 98.9 °F (37.2 °C) 98.2 °F (36.8 °C)   SpO2: 96% 93% 95% 93%        Intake and Output:  Current Shift: No intake/output data recorded. Last three shifts: No intake/output data recorded. Physical Exam:   GENERAL: alert, cooperative, no distress, appears stated age  LUNG: clear to auscultation bilaterally  HEART: regular rate and rhythm, S1, S2 normal, no murmur, click, rub or gallop  ABDOMEN: soft, non-tender. Bowel sounds normal. No masses,  no organomegaly  EXTREMITIES:  extremities normal, atraumatic, no cyanosis or edema    Lab/Data Review: All lab results for the last 24 hours reviewed.          Assessment:     Principal Problem:    Closed left hip fracture ([facility] (5/20/2020)    Active Problems:    Essential hypertension (5/20/2020)      Acquired hypothyroidism (5/20/2020)      Depression (5/20/2020)      Dementia ([facility] (5/20/2020)      CKD (chronic kidney disease) stage 3, GFR 30-59 ml/min ([facility]) (5/20/2020)      Constipation (5/20/2020)        Plan:     S/p Left femoral neck fracture:  S/p repair for left femoral neck fracture  Pending placement, no acute events        HTN:   Continue home amlodipine, metoprolol  Depression: Continue home buspirone, fluoxetine  Hypotoyroidism: Continue home levothyroxine      Signed By: José Miguel Webster MD     May 27, 2020

## 2020-05-27 NOTE — PROGRESS NOTES
May 27, 2020         Post Op day: 6 Days Post-Op Procedure(s) (LRB):  FEMORAL HEAD FRACTURE OPEN REDUCTION INTERNAL FIXATION Left (Left)      Admit Date: 2020  Admit Diagnosis: Closed left hip fracture ([facility]) [S72.002A]  Constipation [K59.00]       Principle Problem: [facility] ([facility] Subjective: Doing well, No complaints, No SOB, No Chest Pain, No Nausea or Vomiting     Objective:   Vital Signs are Stable, No Acute Distress, Alert,  Dressing is Dry,  Neurovascular exam is normal.     Assessment / Plan :  Patient Active Problem List   Diagnosis Code    Closed left hip fracture ([facility] S72.002A    Essential hypertension I10    Acquired hypothyroidism E03.9    Depression F32.9    Dementia ([facility] F03.90    CKD (chronic kidney disease) stage 3, GFR 30-59 ml/min ([facility]) N18.3    Constipation K59.00      Patient Vitals for the past 8 hrs:   BP Temp Pulse Resp SpO2   20 0737 156/77 98 °F (36.7 °C) 70 17 93 %   20 0540 183/84 98.4 °F (36.9 °C) 73 17 96 %    Temp (24hrs), Av.2 °F (36.8 °C), Min:98 °F (36.7 °C), Max:98.4 °F (36.9 °C)    There is no height or weight on file to calculate BMI.     Lab Results   Component Value Date/Time    HGB 9.5 (L) 2020 05:17 AM          Medical Mgmt per hospitalist  Anticoagulation plan: ASA 325mg daily   Continue PT  Fall Precuations  DC disp: rehab placement           Signed By: JODIE Dumont  2020,  10:34 AM

## 2020-05-27 NOTE — PROGRESS NOTES
Problem: Self Care Deficits Care Plan (Adult)  Goal: *Acute Goals and Plan of Care (Insert Text)  Description:   1. Patient will complete lower body bathing and dressing with minimal assistance and adaptive equipment as needed. 2. Patient will complete toileting with minimal assistance. 3. Patient will tolerate 30 minutes of OT treatment with 2-3 rest breaks to increase activity tolerance for ADLs. 4. Patient will complete functional transfers with minimal assistance and adaptive equipment as needed. 5. Patient will complete functional mobility for household distances with minimal assistance and appropriate safety awareness. 5. Patient will tolerate standing sink side with minimal assistance while participating in grooming tasks. Timeframe: 7 visits      Outcome: Progressing Towards Goal     OCCUPATIONAL THERAPY: Daily Note and PM 5/27/2020  INPATIENT: OT Visit Days: 4  Payor: Hernan Wynn / [facility]/PFFS / Product Type: [employer] Medicare /    L LE WBAT   NAME/AGE/GENDER: Gene Alejandre is a 80 y.o. female   PRIMARY DIAGNOSIS:  Closed left hip fracture (HCC) [S72.002A]  Constipation [K59.00] Closed left hip fracture (HCC) Closed left hip fracture (HCC)  Procedure(s) (LRB):  FEMORAL HEAD FRACTURE OPEN REDUCTION INTERNAL FIXATION Left (Left)  6 Days Post-Op  ICD-10: Treatment Diagnosis:    · Pain in left hip (M25.552)  · Stiffness of Left Hip, Not elsewhere classified (M25.652)  · Generalized Muscle Weakness (M62.81)  · Other lack of cordination (R27.8)  · History of falling (Z91.81)   Precautions/Allergies:     Patient has no known allergies. ASSESSMENT:     Ms. Tammi Main presents to the hospital after falling and sustaining L hip fx. Pt is s/p ORIF L hip and is WBAT in the LLE. Pt is alert and sitting up in the chair upon arrival. Pt is extremely pleasant and cooperative. Pt is limited with movement due to pain in her L hip. Pt is oriented to person and time.  Pt does appear to have some memory issues with patient tending to repeat herself throughout the assessment. Pt appears to have functional upper body strength for assisting with scooting to the edge of the chair with minimal assistance. 5/27/2020 Pt found supine in bed upon arrival. Alert and agreeable to be seen by OT. Pt required CGA for supine to sit. Seated edge of bed, pt with intact static and dynamic seated balance with very minimal lean on R hip to off load weight from L hip. Pt required Min A and increased time with use of RW for sit to stand. Upon standing, pt observed to have fair (+) static and fair dynamic standing balance. Pt required Min A with intermittent CGA to walk from bed to sink. Pt required CGA with intermittent SBA to wash hands at sink. Pt required CGA with intermittent SBA to brush dentures. Pt observed to have increased fatigue following completion of self care tasks at sink and required Mod A with RW and increased time to walk from sink to bed. Pt required CGA to complete stand to sit. Pt required Min A for sit to supine. Pt returned to supine with head of bed elevated to 90 degrees to increase safety for self-feeding as dinner tray in room. Pt required SBA for straw management and required increased time to doff wrapper and and place straw into cup. Pt observed to be independent for utensil management and to bring food to mouth. Pt left eating dinner with all needs met and within reach. RN notified. Will continue POC. This section established at most recent assessment   PROBLEM LIST (Impairments causing functional limitations):  1. Decreased Strength  2. Decreased ADL/Functional Activities  3. Decreased Transfer Abilities  4. Decreased Ambulation Ability/Technique  5. Decreased Balance  6. Increased Pain  7. Decreased Activity Tolerance  8. Decreased Flexibility/Joint Mobility  9. Decreased [place] with Home Exercise Program  10.  Decreased Cognition   INTERVENTIONS PLANNED: (Benefits and precautions of occupational therapy have been discussed with the patient.)  1. Activities of daily living training  2. Adaptive equipment training  3. Balance training  4. Clothing management  5. Cognitive training  6. Donning&doffing training  7. Neuromuscular re-eduation  8. Therapeutic activity  9. Therapeutic exercise     TREATMENT PLAN: Frequency/Duration: Follow patient 6 times per week to address above goals. Rehabilitation Potential For Stated Goals: Good     REHAB RECOMMENDATIONS (at time of discharge pending progress):    Placement: It is my opinion, based on this patient's performance to date, that Ms. Gregory Danielson may benefit from intensive therapy at a [address] [place] [address] after discharge due to the functional deficits listed above that are likely to improve with skilled rehabilitation and concerns that he/she may be unsafe to be unsupervised at home due to risk for falls and further functional decline. Equipment:    TBD               OCCUPATIONAL PROFILE AND HISTORY:   History of Present Injury/Illness (Reason for Referral):  See H&P  Past Medical History/Comorbidities:   Ms. Gregory Danielson  has a past medical history of Anxiety, Hematuria, microscopic, Hypercholesteremia, Hypertension, Hypothyroidism, IBS (irritable bowel syndrome), Osteoporosis, Sciatica, Short-term memory loss, and Sleep apnea. Ms. Gregory Danielson  has a past surgical history that includes hx hysterectomy; hx svt ablation; and hx vitrectomy (Bilateral). Social History/Living Environment:   Home Environment: Private residence  Living Alone: No  Support Systems: Family member(s), Child(marv)  Patient Expects to be Discharged to[de-identified] Unknown  Current DME Used/Available at Home: Shower chair, Walker, rolling  Tub or Shower Type: Shower  Prior Level of Function/Work/Activity:  Pt lives at home with her daughter that assists with her care. Pt reports she uses a rolling walker on occasion.  Pt completes her own toileting needs and states she can dress herself. Pt completes a sponge bath or her daughter assist her into the shower. Personal Factors:          Age:  80 y.o. Past/Current Experience:  recent fall and s/p L Hip ORIF           Number of Personal Factors/Comorbidities that affect the Plan of Care: Expanded review of therapy/medical records (1-2):  MODERATE COMPLEXITY   ASSESSMENT OF OCCUPATIONAL PERFORMANCE[de-identified]   Activities of Daily Living:   Basic ADLs (From Assessment) Complex ADLs (From Assessment)   Feeding: Stand-by assistance  Oral Facial Hygiene/Grooming: Minimum assistance  Bathing: Moderate assistance  Upper Body Dressing: Minimum assistance  Lower Body Dressing: Maximum assistance  Toileting: Maximum assistance Instrumental ADL  Meal Preparation: Total assistance  Homemaking: Total assistance   Grooming/Bathing/Dressing Activities of Daily Living   Grooming  Grooming Assistance: Contact guard assistance  Position Performed: Standing  Washing Hands: Contact guard assistance  Brushing Teeth: Contact guard assistance                         Bed/Mat Mobility  Supine to Sit: Contact guard assistance(Increased Time)  Sit to Supine: Minimum assistance  Sit to Stand: Minimum assistance  Stand to Sit: Contact guard assistance  Bed to Chair: Contact guard assistance  Scooting: Stand-by assistance     Most Recent Physical Functioning:   Gross Assessment:                  Posture:  Posture (WDL): Exceptions to WDL  Posture Assessment: Forward head, Rounded shoulders, Trunk flexion  Balance:  Sitting: Intact  Sitting - Static: Good (unsupported)  Sitting - Dynamic: Good (unsupported)  Standing: Impaired  Standing - Static: Fair(+)  Standing - Dynamic : Fair Bed Mobility:  Supine to Sit: Contact guard assistance(Increased Time)  Sit to Supine: Minimum assistance  Scooting: Stand-by assistance  Interventions: Verbal cues; Tactile cues  Wheelchair Mobility:     Transfers:  Sit to Stand: Minimum assistance  Stand to Sit: Contact guard assistance  Bed to Chair: Contact guard assistance  Toilet Transfer : Minimum assistance  Interventions: Verbal cues; Safety awareness training            Patient Vitals for the past 6 hrs:   BP BP Patient Position SpO2 Pulse   05/27/20 1146 116/72 Sitting 95 % 69   05/27/20 1601 124/62 At rest 93 % 67       Mental Status  Neurologic State: Alert  Orientation Level: Oriented to person, Disoriented to place, Disoriented to situation, Disoriented to time  Cognition: Follows commands  Perception: Verbal, Tactile  Perseveration: Verbal cues provided, Tactile cues provided  Safety/Judgement: Fall prevention                          Physical Skills Involved:  1. Range of Motion  2. Balance  3. Strength  4. Activity Tolerance  5. Pain (acute) Cognitive Skills Affected (resulting in the inability to perform in a timely and safe manner):  1. Executive Function  2. Short Term Recall Psychosocial Skills Affected:  1. Habits/Routines  2. Environmental Adaptation  3. Self-Awareness   Number of elements that affect the Plan of Care: 5+:  HIGH COMPLEXITY   CLINICAL DECISION MAKING:   [facility] AM-PAC 6 Clicks   Daily Activity Inpatient Short Form  How much help from another person does the patient currently need. .. Total A Lot A Little None   1. Putting on and taking off regular lower body clothing? [] 1   [x] 2   [] 3   [] 4   2. Bathing (including washing, rinsing, drying)? [] 1   [x] 2   [] 3   [] 4   3. Toileting, which includes using toilet, bedpan or urinal?   [] 1   [x] 2   [] 3   [] 4   4. Putting on and taking off regular upper body clothing? [] 1   [] 2   [x] 3   [] 4   5. Taking care of personal grooming such as brushing teeth? [] 1   [] 2   [x] 3   [] 4   6. Eating meals? [] 1   [] 2   [x] 3   [] 4   © 2007, Trustees of [facility], under license to [employer].  All rights reserved      Score:  Initial: 15 Most Recent: X (Date: -- )    Interpretation of Tool:  Represents activities that are increasingly more difficult (i.e. Bed mobility, Transfers, Gait). Medical Necessity:     · Patient demonstrates   · good and excellent  ·  rehab potential due to higher previous functional level. Reason for Services/Other Comments:  · Patient continues to require skilled intervention due to   · Decreased independence with ADL/functional transfers. · .   Use of outcome tool(s) and clinical judgement create a POC that gives a: LOW COMPLEXITY         TREATMENT:   (In addition to Assessment/Re-Assessment sessions the following treatments were rendered)     Pre-treatment Symptoms/Complaints:  Pt with increased pain compared to prior OT session. Pain: Initial:   Pain Intensity 1: 5  Pain Location 1: Hip  Pain Orientation 1: Left  Post Session:  Unchanged     Self Care: (32 minutes): Procedure(s) utilized to improve and/or restore self-care/home management as related to grooming. Required minimal to moderate visual, verbal and manual cueing to facilitate activities of daily living skills. Pt required Min A with intermittent CGA to walk from bed to sink. Pt required CGA with intermittent SBA to wash hands at sink. Pt required CGA with intermittent SBA to brush dentures. Pt observed to have increased fatigue following completion of self care tasks at sink and required Mod A with RW and increased time to walk from sink to bed. Pt returned to supine with head of bed elevated to 90 degrees to increase safety for self-feeding as dinner tray in room. Pt required SBA for straw management and required increased time to doff wrapper and and place straw into cup. Pt observed to be independent for utensil management and to bring food to mouth. Braces/Orthotics/Lines/Etc:   · O2 Device: Room air  Treatment/Session Assessment:    · Response to Treatment:  Pt with increased pain this session. Good participant and very pleasant.    · Interdisciplinary Collaboration:   o Occupational Therapist  o Registered Nurse  o Rehabilitation Attendant  · After treatment position/precautions:   o Supine in bed  o Bed alarm/tab alert on  o Bed/Chair-wheels locked  o Bed in low position  o Call light within reach  o RN notified  o Head of bed elevated to increased safety for self-feeding. · Compliance with Program/Exercises: Compliant most of the time, Will assess as treatment progresses. · Recommendations/Intent for next treatment session: \"Next visit will focus on advancements to more challenging activities and reduction in assistance provided\".   Total Treatment Duration:  OT Patient Time In/Time Out  Time In: 1601  Time Out: 1633     Raiza Gipson OTR/L

## 2020-05-27 NOTE — PROGRESS NOTES
Problem: Mobility Impaired (Adult and Pediatric)  Goal: *Acute Goals and Plan of Care (Insert Text)  Description: LTG:  (1.)Ms. Celina Haile will move from supine to sit and sit to supine, scoot up and down and roll side to side with MINIMAL ASSIST within 7 treatment day(s). (2.)Ms. Celina Haile will transfer from bed to chair and chair to bed with MINIMAL ASSIST using the least restrictive device within 7 treatment day(s). (3.)Ms. Celina Haile will ambulate with MINIMAL ASSIST for 10 feet with the least restrictive device within 7 treatment day(s). (4.)Ms. Celina Haile will perform exercises per HEP for 15+ minutes to improve strength and mobility within 7 days. ________________________________________________________________________________________________   Outcome: Progressing Towards Goal     PHYSICAL THERAPY: Daily Note and PM 5/27/2020  INPATIENT: PT Visit Days : 6  Payor: Yuly George / Plan: Raymundo Nuñez PPO/PFFS / Product Type: [employer] Medicare /       NAME/AGE/GENDER: Abhishek Cowan is a 80 y.o. female   PRIMARY DIAGNOSIS: Closed left hip fracture (HCC) [S72.002A]  Constipation [K59.00] Closed left hip fracture (HCC) Closed left hip fracture (HCC)  Procedure(s) (LRB):  FEMORAL HEAD FRACTURE OPEN REDUCTION INTERNAL FIXATION Left (Left)  6 Days Post-Op  ICD-10: Treatment Diagnosis:    · Generalized Muscle Weakness (M62.81)  · Difficulty in walking, Not elsewhere classified (R26.2)  · Other abnormalities of gait and mobility (R26.89)  · History of falling (Z91.81)   Precaution/Allergies:  Patient has no known allergies. ASSESSMENT:     Ms. Celina Haile is a pleasantly confused 80year old female admitted from home with left hip fracture and is seen s/p ORIF. WBAT per ortho orders. Per chart review, pt lives with daughter who is her caregiver. She is typically ambulatory for household distances slowly without DME use, does have cane available.      The patient is seated in the recliner chair upon contact and agreeable to PT treatment. The patient scooted forward in the chair with SBA and performed sit to stand with min A-CGA and verbal cues for hand placement. The patient then demonstrated fair-good static standing balance while therapist assisted with donning new brief. The patient then ambulated 5' to the [facility] and sat with CGA. She demonstrated intact sitting balance during ADL activities. The patient then stood and ambulated an additional 30' in the room with the RW and CGA, verbal cues for gait pattern and walker management/safety. The patient then sat on the edge of the bed and performed sit to supine with min A for elevating BLE. She was then positioned for comfort with needs in reach. Overall the patient is making slow progress toward therapy goals as indicated by increased gait distances. Will continue skilled PT treatment as patient is still below functional baseline. This section established at most recent assessment   PROBLEM LIST (Impairments causing functional limitations):  1. Decreased Strength  2. Decreased Transfer Abilities  3. Decreased Ambulation Ability/Technique  4. Decreased Balance  5. Increased Pain  6. Decreased Activity Tolerance  7. Decreased Pacing Skills  8. Decreased Flexibility/Joint Mobility  9. Decreased Knowledge of Precautions  10. Decreased Skin Integrity/Hygeine  11. Decreased Cognition   INTERVENTIONS PLANNED: (Benefits and precautions of physical therapy have been discussed with the patient.)  1. Balance Exercise  2. Bed Mobility  3. Gait Training  4. Home Exercise Program (HEP)  5. Therapeutic Activites  6. Therapeutic Exercise/Strengthening  7. Transfer Training     TREATMENT PLAN: Frequency/Duration: 3 times a week for duration of hospital stay  Rehabilitation Potential For Stated Goals: Good     REHAB RECOMMENDATIONS (at time of discharge pending progress):    Placement: It is my opinion, based on this patient's performance to date, that Ms. Garza Zena may benefit from intensive therapy at [address] after discharge due to the functional deficits listed above that are likely to improve with skilled rehabilitation and concerns that he/she may be unsafe to be unsupervised at home due to weakness, fall risk, need for heavy assistance. Equipment:    tbd pending progress              HISTORY:   History of Present Injury/Illness (Reason for Referral):  Per H&P, \"80year-old  female patient with a known history of hypertension, dementia, hypothyroidism and osteoporosis. Patient was seen yesterday in emergency room for history of fall. She had 1 more fall last night and brought to the emergency room again for further evaluation . Patient has been complaining of left hip pain.     Apart from fall and left hip pain of the 10 directions apart from the one mentioned above patient other review of systems were negative noncontributory.     WBC of 11.5, creatinine of 1.28, GFR of 50. Left hip x-ray-  Impression: Question acute nondisplaced transcervical fracture of the left  femoral neck.  MRI of the left hip without contrast may be more sensitive in  Evaluation.     Patient will be transferred to [facility] for further management of left hip fracture and constipation(based on the x-ray KUB done yesterday). \"  Past Medical History/Comorbidities:   Ms. Mohamud Hendricks  has a past medical history of Anxiety, Hematuria, microscopic, Hypercholesteremia, Hypertension, Hypothyroidism, IBS (irritable bowel syndrome), Osteoporosis, Sciatica, Short-term memory loss, and Sleep apnea. Ms. Mohamud Hendricks  has a past surgical history that includes hx hysterectomy; hx svt ablation; and hx vitrectomy (Bilateral).   Social History/Living Environment:   Home Environment: Private residence  Living Alone: No  Support Systems: Family member(s), Child(marv)  Patient Expects to be Discharged to[de-identified] Unknown  Current DME Used/Available at Home: Shower chair, Walker, rolling  Tub or Shower Type: Shower  Prior Level of Function/Work/Activity:  Lives with daughter who is her caregiver and assists with ADLs, mobility. Per chart review, pt has walker but does not typically use. Ambulates slowly in hallway without DME use. Number of Personal Factors/Comorbidities that affect the Plan of Care: 3+: HIGH COMPLEXITY   EXAMINATION:   Most Recent Physical Functioning:   Gross Assessment:                  Posture:     Balance:  Sitting: Intact  Standing: Impaired  Standing - Static: Good;Fair  Standing - Dynamic : Fair Bed Mobility:  Supine to Sit: Minimum assistance  Sit to Supine: Minimum assistance  Scooting: Stand-by assistance  Interventions: Verbal cues; Tactile cues  Wheelchair Mobility:     Transfers:  Sit to Stand: Minimum assistance  Stand to Sit: Contact guard assistance  Bed to Chair: Contact guard assistance  Toilet Transfer : Minimum assistance  Interventions: Verbal cues; Safety awareness training  Gait:     Base of Support: Shift to right  Speed/Manasa: Slow;Shuffled  Step Length: Right shortened;Left shortened  Gait Abnormalities: Antalgic;Decreased step clearance;Trunk sway increased  Distance (ft): 30 Feet (ft)  Assistive Device: Walker, rolling;Gait belt  Ambulation - Level of Assistance: Contact guard assistance  Interventions: Verbal cues; Safety awareness training; Tactile cues      Body Structures Involved:  1. Eyes and Ears  2. Bones  3. Joints  4. Muscles Body Functions Affected:  1. Mental  2. Sensory/Pain  3. Neuromusculoskeletal  4. Movement Related Activities and Participation Affected:  1. General Tasks and Demands  2. Mobility  3. Domestic Life  4.  Community, Social and [place]   Number of elements that affect the Plan of Care: 4+: HIGH COMPLEXITY   CLINICAL PRESENTATION:   Presentation: Evolving clinical presentation with changing clinical characteristics: MODERATE COMPLEXITY   CLINICAL DECISION MAKIN [facility] Inpatient Short Form  How much difficulty does the patient currently have. .. Unable A Lot A Little None   1. Turning over in bed (including adjusting bedclothes, sheets and blankets)? [] 1   [x] 2   [] 3   [] 4   2. Sitting down on and standing up from a chair with arms ( e.g., wheelchair, bedside commode, etc.)   [] 1   [x] 2   [] 3   [] 4   3. Moving from lying on back to sitting on the side of the bed? [] 1   [x] 2   [] 3   [] 4   How much help from another person does the patient currently need. .. Total A Lot A Little None   4. Moving to and from a bed to a chair (including a wheelchair)? [] 1   [x] 2   [] 3   [] 4   5. Need to walk in hospital room? [x] 1   [] 2   [] 3   [] 4   6. Climbing 3-5 steps with a railing? [x] 1   [] 2   [] 3   [] 4   © 2007, Trustees of [address] [place] [address], under license to "[employer]. All rights reserved      Score:  Initial: 10 Most Recent: X (Date: -- )    Interpretation of Tool:  Represents activities that are increasingly more difficult (i.e. Bed mobility, Transfers, Gait). Medical Necessity:     · Patient demonstrates good rehab potential due to higher previous functional level. Reason for Services/Other Comments:  · Patient continues to demonstrate capacity to improve strength, mobility, balance, transfers, and activity tolerance which will increase independence, decrease amount of assistance required from caregiver and increase safety.    Use of outcome tool(s) and clinical judgement create a POC that gives a: Questionable prediction of patient's progress: MODERATE COMPLEXITY            TREATMENT:   (In addition to Assessment/Re-Assessment sessions the following treatments were rendered)   Pre-treatment Symptoms/Complaints:  \"Sure\"  Pain: Initial:   Pain Intensity 1: 0  Post Session:  0/10         Therapeutic Activity: (   26 min ): Therapeutic activities including bed mobility, Chair transfers, patient education, static/dynamic standing balance, and Ambulation on level ground to improve mobility, strength, balance, coordination and activity tolerance. Required minimal assist Verbal cues; Safety awareness training; Tactile cues to promote static and dynamic balance in standing and promote motor control of bilateral, lower extremity(s). Therapeutic Exercise: (   min):  Exercises per grid below to improve mobility and strength. Required moderate verbal and tactile cues to promote proper body alignment, promote proper body posture and promote proper body mechanics. Progressed resistance, range and repetitions as indicated. Date:  5/23 Date:  5/25 Date:  5/26   Activity/Exercise Seated Parameters Parameters Parameters   Heel raises X 20 B x20 x20   Toe raises X 20 B x20 x20   LAQ's X 20 B x20 2x10   Hip Flex X 20 B x10 2x10   Hip ABD X 20 B     Sit to stands   x5                   Braces/Orthotics/Lines/Etc:   · O2 Device: Room air  Treatment/Session Assessment:    · Response to Treatment:  See above    · Interdisciplinary Collaboration:   o Physical Therapy Assistant  o Registered Nurse  · After treatment position/precautions:   o Supine in bed  o Bed/Chair-wheels locked  o Bed in low position  o Call light within reach  o RN notified   · Compliance with Program/Exercises: Will assess as treatment progresses  · Recommendations/Intent for next treatment session: \"Next visit will focus on advancements to more challenging activities and reduction in assistance provided\".   Total Treatment Duration:  PT Patient Time In/Time Out  Time In: 1302  Time Out: [address] [facility]

## 2020-05-27 NOTE — PROGRESS NOTES
Problem: Mobility Impaired (Adult and Pediatric)  Goal: *Acute Goals and Plan of Care (Insert Text)  Description: LTG:  (1.)Ms. Jasmeet Chowdhury will move from supine to sit and sit to supine, scoot up and down and roll side to side with MINIMAL ASSIST within 7 treatment day(s). (2.)Ms. Jasmeet Chowdhury will transfer from bed to chair and chair to bed with MINIMAL ASSIST using the least restrictive device within 7 treatment day(s). (3.)Ms. Jasmeet Chowdhury will ambulate with MINIMAL ASSIST for 10 feet with the least restrictive device within 7 treatment day(s). (4.)Ms. Jasmeet Chowdhury will perform exercises per HEP for 15+ minutes to improve strength and mobility within 7 days. ________________________________________________________________________________________________   Outcome: Progressing Towards Goal     PHYSICAL THERAPY: Daily Note and AM 5/27/2020  INPATIENT: PT Visit Days : 6  Payor: Nohemi Tinoco / Plan: [id number] PPO/PFFS / Product Type: Managed Care Medicare /       NAME/AGE/GENDER: Drea Thorne is a 80 y.o. female   PRIMARY DIAGNOSIS: Closed left hip fracture (HCC) [S72.002A]  Constipation [K59.00] Closed left hip fracture (HCC) Closed left hip fracture (HCC)  Procedure(s) (LRB):  FEMORAL HEAD FRACTURE OPEN REDUCTION INTERNAL FIXATION Left (Left)  6 Days Post-Op  ICD-10: Treatment Diagnosis:    · Generalized Muscle Weakness (M62.81)  · Difficulty in walking, Not elsewhere classified (R26.2)  · Other abnormalities of gait and mobility (R26.89)  · History of falling (Z91.81)   Precaution/Allergies:  Patient has no known allergies. ASSESSMENT:     Ms. Jasmeet Chowdhury is a pleasantly confused 80year old female admitted from home with left hip fracture and is seen s/p ORIF. WBAT per ortho orders. Per chart review, pt lives with daughter who is her caregiver. She is typically ambulatory for household distances slowly without DME use, does have cane available.      The patient is supine in bed upon contact and agreeable to PT treatment. The patient performed bed mobility with min A for mobilizing the LE and supine to sit. She demonstrated good sitting balance at the edge of the bed and scooted forward with SBA. The patient performed sit to stand with min A and verbal cueing and then ambulated 15' w/ RW, additional time, and min A-CGA. She demonstrated a slow, step-to-gait pattern with verbal cues for step sequencing, walker management, and posture. The patient then sat in the recliner chair with min A for a seated rest break. The patient then stood again with min A and demonstrated fair static standing balance while assisted with changing brief. The patient returned to sitting and was positioned for comfort with needs in reach. Overall the patient is making slow, steady progress toward therapy goals as indicated by increased gait distances. Will continue skilled PT treatment as patient is still below functional baseline. This section established at most recent assessment   PROBLEM LIST (Impairments causing functional limitations):  1. Decreased Strength  2. Decreased Transfer Abilities  3. Decreased Ambulation Ability/Technique  4. Decreased Balance  5. Increased Pain  6. Decreased Activity Tolerance  7. Decreased Pacing Skills  8. Decreased Flexibility/Joint Mobility  9. Decreased Knowledge of Precautions  10. Decreased Skin Integrity/Hygeine  11. Decreased Cognition   INTERVENTIONS PLANNED: (Benefits and precautions of physical therapy have been discussed with the patient.)  1. Balance Exercise  2. Bed Mobility  3. Gait Training  4. Home Exercise Program (HEP)  5. Therapeutic Activites  6. Therapeutic Exercise/Strengthening  7. Transfer Training     TREATMENT PLAN: Frequency/Duration: 3 times a week for duration of hospital stay  Rehabilitation Potential For Stated Goals: Good     REHAB RECOMMENDATIONS (at time of discharge pending progress):    Placement:   It is my opinion, based on this patient's performance to date, that Ms. Carrie Rahman may benefit from intensive therapy at a [address] [place] [address] after discharge due to the functional deficits listed above that are likely to improve with skilled rehabilitation and concerns that he/she may be unsafe to be unsupervised at home due to weakness, fall risk, need for heavy assistance. Equipment:    tbd pending progress              HISTORY:   History of Present Injury/Illness (Reason for Referral):  Per H&P, \"80year-old  female patient with a known history of hypertension, dementia, hypothyroidism and osteoporosis. Patient was seen yesterday in emergency room for history of fall. She had 1 more fall last night and brought to the emergency room again for further evaluation . Patient has been complaining of left hip pain.     Apart from fall and left hip pain of the 10 directions apart from the one mentioned above patient other review of systems were negative noncontributory.     WBC of 11.5, creatinine of 1.28, GFR of 50. Left hip x-ray-  Impression: Question acute nondisplaced transcervical fracture of the left  femoral neck.  MRI of the left hip without contrast may be more sensitive in  Evaluation.     Patient will be transferred to [facility] for further management of left hip fracture and constipation(based on the x-ray KUB done yesterday). \"  Past Medical History/Comorbidities:   Ms. Carrie Rahman  has a past medical history of Anxiety, Hematuria, microscopic, Hypercholesteremia, Hypertension, Hypothyroidism, IBS (irritable bowel syndrome), Osteoporosis, Sciatica, Short-term memory loss, and Sleep apnea. Ms. Carrie Rahman  has a past surgical history that includes hx hysterectomy; hx svt ablation; and hx vitrectomy (Bilateral).   Social History/Living Environment:   Home Environment: Private residence  Living Alone: No  Support Systems: Family member(s), Child(marv)  Patient Expects to be Discharged to[de-identified] Unknown  Current DME Used/Available at Home: Shower chair, Walker, rolling  Tub or Shower Type: Shower  Prior Level of Function/Work/Activity:  Lives with daughter who is her caregiver and assists with ADLs, mobility. Per chart review, pt has walker but does not typically use. Ambulates slowly in hallway without DME use. Number of Personal Factors/Comorbidities that affect the Plan of Care: 3+: HIGH COMPLEXITY   EXAMINATION:   Most Recent Physical Functioning:   Gross Assessment:                  Posture:     Balance:  Sitting: Intact  Standing: Impaired  Standing - Static: Good;Fair  Standing - Dynamic : Fair Bed Mobility:  Supine to Sit: Minimum assistance  Scooting: Stand-by assistance  Interventions: Verbal cues; Tactile cues  Wheelchair Mobility:     Transfers:  Sit to Stand: Minimum assistance  Stand to Sit: Contact guard assistance  Bed to Chair: Contact guard assistance  Interventions: Verbal cues; Safety awareness training  Gait:     Base of Support: Shift to right  Speed/Manasa: Slow;Shuffled  Step Length: Right shortened;Left shortened  Gait Abnormalities: Antalgic;Decreased step clearance;Trunk sway increased  Distance (ft): 15 Feet (ft)  Assistive Device: Walker, rolling;Gait belt  Ambulation - Level of Assistance: Contact guard assistance  Interventions: Verbal cues; Safety awareness training      Body Structures Involved:  1. Eyes and Ears  2. Bones  3. Joints  4. Muscles Body Functions Affected:  1. Mental  2. Sensory/Pain  3. Neuromusculoskeletal  4. Movement Related Activities and Participation Affected:  1. General Tasks and Demands  2. Mobility  3. Domestic Life  4.  Community, Social and Evangeline Atkins   Number of elements that affect the Plan of Care: 4+: HIGH COMPLEXITY   CLINICAL PRESENTATION:   Presentation: Evolving clinical presentation with changing clinical characteristics: MODERATE COMPLEXITY   CLINICAL DECISION MAKIN [facility] Short Form  How much difficulty does the patient currently have. .. Unable A Lot A Little None   1. Turning over in bed (including adjusting bedclothes, sheets and blankets)? [] 1   [x] 2   [] 3   [] 4   2. Sitting down on and standing up from a chair with arms ( e.g., wheelchair, bedside commode, etc.)   [] 1   [x] 2   [] 3   [] 4   3. Moving from lying on back to sitting on the side of the bed? [] 1   [x] 2   [] 3   [] 4   How much help from another person does the patient currently need. .. Total A Lot A Little None   4. Moving to and from a bed to a chair (including a wheelchair)? [] 1   [x] 2   [] 3   [] 4   5. Need to walk in hospital room? [x] 1   [] 2   [] 3   [] 4   6. Climbing 3-5 steps with a railing? [x] 1   [] 2   [] 3   [] 4   © 2007, Trustees of [address] [place] [address] [address] [address], under license to [employer]. All rights reserved      Score:  Initial: 10 Most Recent: X (Date: -- )    Interpretation of Tool:  Represents activities that are increasingly more difficult (i.e. Bed mobility, Transfers, Gait). Medical Necessity:     · Patient demonstrates good rehab potential due to higher previous functional level. Reason for Services/Other Comments:  · Patient continues to demonstrate capacity to improve strength, mobility, balance, transfers, and activity tolerance which will increase independence, decrease amount of assistance required from caregiver and increase safety.    Use of outcome tool(s) and clinical judgement create a POC that gives a: Questionable prediction of patient's progress: MODERATE COMPLEXITY            TREATMENT:   (In addition to Assessment/Re-Assessment sessions the following treatments were rendered)   Pre-treatment Symptoms/Complaints:  \"I'll do my best\"  Pain: Initial:   Pain Intensity 1: 0  Post Session:  0/10         Therapeutic Activity: (   23 min ): Therapeutic activities including Chair transfers, patient education, static/dynamic standing balance, and Ambulation on level ground to improve mobility, strength, balance, coordination and activity tolerance. Required minimal assist Verbal cues; Safety awareness training to promote static and dynamic balance in standing and promote motor control of bilateral, lower extremity(s). Therapeutic Exercise: (   min):  Exercises per grid below to improve mobility and strength. Required moderate verbal and tactile cues to promote proper body alignment, promote proper body posture and promote proper body mechanics. Progressed resistance, range and repetitions as indicated. Date:  5/23 Date:  5/25 Date:  5/26   Activity/Exercise Seated Parameters Parameters Parameters   Heel raises X 20 B x20 x20   Toe raises X 20 B x20 x20   LAQ's X 20 B x20 2x10   Hip Flex X 20 B x10 2x10   Hip ABD X 20 B     Sit to stands   x5                   Braces/Orthotics/Lines/Etc:   · O2 Device: Room air  Treatment/Session Assessment:    · Response to Treatment:  See above    · Interdisciplinary Collaboration:   o Physical Therapy Assistant  o Registered Nurse  o Rehabilitation Attendant  · After treatment position/precautions:   o Up in chair  o Bed/Chair-wheels locked  o Bed in low position  o Call light within reach  o RN notified   · Compliance with Program/Exercises: Will assess as treatment progresses  · Recommendations/Intent for next treatment session: \"Next visit will focus on advancements to more challenging activities and reduction in assistance provided\".   Total Treatment Duration:  PT Patient Time In/Time Out  Time In: 0941  Time Out: [address], [place]

## 2020-05-28 VITALS
SYSTOLIC BLOOD PRESSURE: 121 MMHG | DIASTOLIC BLOOD PRESSURE: 67 MMHG | OXYGEN SATURATION: 94 % | TEMPERATURE: 98 F | HEART RATE: 60 BPM | RESPIRATION RATE: 18 BRPM

## 2020-05-28 LAB
ANION GAP SERPL CALC-SCNC: 3 MMOL/L (ref 7–16)
BASOPHILS # BLD: 0 K/UL (ref 0–0.2)
BASOPHILS NFR BLD: 0 % (ref 0–2)
BUN SERPL-MCNC: 20 MG/DL (ref 8–23)
CALCIUM SERPL-MCNC: 9 MG/DL (ref 8.3–10.4)
CHLORIDE SERPL-SCNC: 107 MMOL/L (ref 98–107)
CO2 SERPL-SCNC: 30 MMOL/L (ref 21–32)
CREAT SERPL-MCNC: 1.23 MG/DL (ref 0.6–1)
DIFFERENTIAL METHOD BLD: ABNORMAL
EMERGENT DISEASE PANEL, EDPR: NOT DETECTED
EOSINOPHIL # BLD: 0.3 K/UL (ref 0–0.8)
EOSINOPHIL NFR BLD: 3 % (ref 0.5–7.8)
ERYTHROCYTE [DISTWIDTH] IN BLOOD BY AUTOMATED COUNT: 14.8 % (ref 11.9–14.6)
GLUCOSE SERPL-MCNC: 79 MG/DL (ref 65–100)
HCT VFR BLD AUTO: 30.3 % (ref 35.8–46.3)
HGB BLD-MCNC: 9.9 G/DL (ref 11.7–15.4)
IMM GRANULOCYTES # BLD AUTO: 0.1 K/UL (ref 0–0.5)
IMM GRANULOCYTES NFR BLD AUTO: 1 % (ref 0–5)
LYMPHOCYTES # BLD: 1.8 K/UL (ref 0.5–4.6)
LYMPHOCYTES NFR BLD: 24 % (ref 13–44)
MCH RBC QN AUTO: 27.7 PG (ref 26.1–32.9)
MCHC RBC AUTO-ENTMCNC: 32.7 G/DL (ref 31.4–35)
MCV RBC AUTO: 84.9 FL (ref 79.6–97.8)
MONOCYTES # BLD: 0.9 K/UL (ref 0.1–1.3)
MONOCYTES NFR BLD: 12 % (ref 4–12)
NEUTS SEG # BLD: 4.5 K/UL (ref 1.7–8.2)
NEUTS SEG NFR BLD: 60 % (ref 43–78)
NRBC # BLD: 0 K/UL (ref 0–0.2)
PLATELET # BLD AUTO: 328 K/UL (ref 150–450)
PMV BLD AUTO: 10.7 FL (ref 9.4–12.3)
POTASSIUM SERPL-SCNC: 4.3 MMOL/L (ref 3.5–5.1)
RBC # BLD AUTO: 3.57 M/UL (ref 4.05–5.2)
SODIUM SERPL-SCNC: 140 MMOL/L (ref 136–145)
WBC # BLD AUTO: 7.6 K/UL (ref 4.3–11.1)

## 2020-05-28 PROCEDURE — 85025 COMPLETE CBC W/AUTO DIFF WBC: CPT

## 2020-05-28 PROCEDURE — 74011250637 HC RX REV CODE- 250/637: Performed by: ORTHOPAEDIC SURGERY

## 2020-05-28 PROCEDURE — 97164 PT RE-EVAL EST PLAN CARE: CPT | Performed by: PHYSICAL THERAPIST

## 2020-05-28 PROCEDURE — 80048 BASIC METABOLIC PNL TOTAL CA: CPT

## 2020-05-28 PROCEDURE — 97535 SELF CARE MNGMENT TRAINING: CPT

## 2020-05-28 PROCEDURE — 36415 COLL VENOUS BLD VENIPUNCTURE: CPT

## 2020-05-28 PROCEDURE — 97530 THERAPEUTIC ACTIVITIES: CPT | Performed by: PHYSICAL THERAPIST

## 2020-05-28 PROCEDURE — 74011250637 HC RX REV CODE- 250/637: Performed by: FAMILY MEDICINE

## 2020-05-28 RX ORDER — FERROUS SULFATE, DRIED 160(50) MG
1 TABLET, EXTENDED RELEASE ORAL
Qty: 30 TAB | Refills: 0 | Status: SHIPPED | OUTPATIENT
Start: 2020-05-28

## 2020-05-28 RX ORDER — FLUOXETINE HYDROCHLORIDE 40 MG/1
40 CAPSULE ORAL DAILY
Qty: 15 CAP | Refills: 0 | Status: SHIPPED | OUTPATIENT
Start: 2020-05-28

## 2020-05-28 RX ORDER — BUSPIRONE HYDROCHLORIDE 5 MG/1
5 TABLET ORAL 2 TIMES DAILY
Qty: 16 TAB | Refills: 0 | Status: SHIPPED | OUTPATIENT
Start: 2020-05-28

## 2020-05-28 RX ORDER — ASPIRIN 325 MG
325 TABLET ORAL 2 TIMES DAILY
Qty: 60 TAB | Refills: 0 | Status: SHIPPED | OUTPATIENT
Start: 2020-05-28

## 2020-05-28 RX ORDER — BUSPIRONE HYDROCHLORIDE 5 MG/1
5 TABLET ORAL 2 TIMES DAILY
Qty: 16 TAB | Refills: 0 | Status: SHIPPED | OUTPATIENT
Start: 2020-05-28 | End: 2020-05-28 | Stop reason: SDUPTHER

## 2020-05-28 RX ORDER — HYDROCODONE BITARTRATE AND ACETAMINOPHEN 5; 325 MG/1; MG/1
1 TABLET ORAL
Qty: 12 TAB | Refills: 0 | Status: SHIPPED | OUTPATIENT
Start: 2020-05-28 | End: 2020-05-31

## 2020-05-28 RX ORDER — ACETAMINOPHEN 325 MG/1
650 TABLET ORAL
Qty: 30 TAB | Refills: 0 | Status: SHIPPED | OUTPATIENT
Start: 2020-05-28

## 2020-05-28 RX ORDER — MAG HYDROX/ALUMINUM HYD/SIMETH 200-200-20
30 SUSPENSION, ORAL (FINAL DOSE FORM) ORAL
Qty: 1 BOTTLE | Refills: 0 | Status: SHIPPED | OUTPATIENT
Start: 2020-05-28

## 2020-05-28 RX ORDER — FLUOXETINE HYDROCHLORIDE 40 MG/1
40 CAPSULE ORAL DAILY
Qty: 15 CAP | Refills: 0 | Status: SHIPPED | OUTPATIENT
Start: 2020-05-28 | End: 2020-05-28 | Stop reason: SDUPTHER

## 2020-05-28 RX ADMIN — BUSPIRONE HYDROCHLORIDE 5 MG: 5 TABLET ORAL at 09:58

## 2020-05-28 RX ADMIN — LEVOTHYROXINE SODIUM 50 MCG: 0.05 TABLET ORAL at 05:26

## 2020-05-28 RX ADMIN — FLUOXETINE 40 MG: 20 CAPSULE ORAL at 09:58

## 2020-05-28 RX ADMIN — CYPROHEPTADINE HYDROCHLORIDE 4 MG: 4 TABLET ORAL at 09:58

## 2020-05-28 RX ADMIN — AMLODIPINE BESYLATE 5 MG: 5 TABLET ORAL at 05:27

## 2020-05-28 RX ADMIN — Medication 1 TABLET: at 09:58

## 2020-05-28 RX ADMIN — HYDROCODONE BITARTRATE AND ACETAMINOPHEN 2 TABLET: 5; 325 TABLET ORAL at 05:26

## 2020-05-28 RX ADMIN — POTASSIUM CHLORIDE 20 MEQ: 20 TABLET, EXTENDED RELEASE ORAL at 09:58

## 2020-05-28 RX ADMIN — POLYETHYLENE GLYCOL 3350 17 G: 17 POWDER, FOR SOLUTION ORAL at 09:58

## 2020-05-28 RX ADMIN — METOPROLOL SUCCINATE 50 MG: 50 TABLET, EXTENDED RELEASE ORAL at 09:58

## 2020-05-28 RX ADMIN — ASPIRIN 325 MG ORAL TABLET 325 MG: 325 PILL ORAL at 09:58

## 2020-05-28 NOTE — DISCHARGE SUMMARY
Discharge Summary     Patient: Callie North MRN: [id number]  SSN: [id number]    YOB: 1929  Age: [address] y.o. Sex: female       Admit Date: 5/20/2020    Discharge Date: 5/28/2020      Admission Diagnoses: Closed left hip fracture ([facility] [S72.002A]  Constipation [K59.00]    Discharge Diagnoses:   Problem List as of 5/28/2020 Date Reviewed: 6/20/2017          Codes Class Noted - Resolved    * (Principal) Closed left hip fracture ([facility] ICD-10-CM: S72.002A  ICD-9-CM: 820.8  5/20/2020 - Present        Essential hypertension ICD-10-CM: I10  ICD-9-CM: 401.9  5/20/2020 - Present        Acquired hypothyroidism ICD-10-CM: E03.9  ICD-9-CM: 244.9  5/20/2020 - Present        Depression ICD-10-CM: F32.9  ICD-9-CM: 311  5/20/2020 - Present        Dementia ([facility] ICD-10-CM: F03.90  ICD-9-CM: 294.20  5/20/2020 - Present        CKD (chronic kidney disease) stage 3, GFR 30-59 ml/min ([facility]) ICD-10-CM: N18.3  ICD-9-CM: 585.3  5/20/2020 - Present        Constipation ICD-10-CM: K59.00  ICD-9-CM: 564.00  5/20/2020 - Present               Discharge Condition: [facility] Course: HPI, as per admitting provider:\"[address]year-old  can female patient with a known history of hypertension, dementia, hypothyroidism and osteoporosis. Patient was seen yesterday in emergency room for history of fall. She had 1 more fall last night and brought to the emergency room again for further evaluation . Patient has been complaining of left hip pain. Apart from fall and left hip pain of the 10 directions apart from the one mentioned above patient other review of systems were negative noncontributory. WBC of 11.5, creatinine of 1.28, GFR of 50. Left hip x-ray-Impression: Question acute nondisplaced transcervical fracture of the left  femoral neck.  MRI of the left hip without contrast may be more sensitive in  Evaluation. Patient will be transferred to [facility] for further management of left hip fracture and constipation(based on the x-ray SHANAE done yesterday). \"     The patient was admitted for acute eft neck femoral fracture, she ws evaluated by orthopedics, and evaluated , and underwent left fracture repair. She has been over all stable, after surgery, and she underwent PT. She requires acute rehab, and the discharge was delayed for finding a facility. She will be discharged on aspirin 325 mg PO BID, for DVT ppx, after hip surgery. She needs to f/u CBC, BMP in 1 week, and have the PCP f/u on results. She will also need to f/u with orthopedics in 2 weeks, and pcp in 1 week. Total discharge time, 31 minutes. PE  Gen: NAD  Lungs: CTA b/l  Cardiac : RRR, no murmurs  Abdomen< soft, non tender, no distended  Extr, no calf tenderness      Consults: Orthopedics      Disposition: acute rehab    Discharge Medications:   Current Discharge Medication List      START taking these medications    Details   acetaminophen (TYLENOL) 325 mg tablet Take 2 Tabs by mouth every four (4) hours as needed for Pain or Fever. Qty: 30 Tab, Refills: 0      alum-mag hydroxide-simeth (MYLANTA) 200-200-20 mg/5 mL susp Take 30 mL by mouth every four (4) hours as needed for Indigestion. Qty: 1 Bottle, Refills: 0      aspirin (ASPIRIN) 325 mg tablet Take 1 Tab by mouth two (2) times a day. Qty: 60 Tab, Refills: 0      calcium-vitamin D (OYSTER SHELL) 500 mg(1,250mg) -200 unit per tablet Take 1 Tab by mouth three (3) times daily (with meals). Qty: 30 Tab, Refills: 0      HYDROcodone-acetaminophen (NORCO) 5-325 mg per tablet Take 1 Tab by mouth every six (6) hours as needed for Pain for up to 3 days. Max Daily Amount: 4 Tabs. Qty: 12 Tab, Refills: 0    Associated Diagnoses: Closed fracture of left hip, initial encounter ([facility]         CONTINUE these medications which have CHANGED    Details   FLUoxetine (PROzac) 40 mg capsule Take 1 Cap by mouth daily. Qty: 15 Cap, Refills: 0      busPIRone (BUSPAR) 5 mg tablet Take 1 Tab by mouth two (2) times a day.   Qty: 16 Tab, Refills: 0 CONTINUE these medications which have NOT CHANGED    Details   potassium chloride SR (K-TAB) 20 mEq tablet Take  by mouth. cyproheptadine (PERIACTIN) 4 mg tablet Take  by mouth daily. levothyroxine (SYNTHROID) 50 mcg tablet TAKE 1 TABLET BY MOUTH EVERY MORNING ON AN EMPTY STOMACH  Refills: 0      amLODIPine (NORVASC) 5 mg tablet Take 5 mg by mouth every morning. metoprolol succinate (TOPROL-XL) 50 mg XL tablet 1 TABLET ONCE A DAY FOR BLOOD PRESSURE ORALLY 90 DAYS  Refills: 0             Activity: PT/OT Eval and Treat  Diet: Renal Diet  Wound Care: Keep wound clean and dry, covered with dressing    Follow-up Appointments   Procedures    FOLLOW UP VISIT Appointment in: One Week pcp     pcp     Standing Status:   Standing     Number of Occurrences:   1     Order Specific Question:   Appointment in     Answer: One Week    FOLLOW UP VISIT Appointment in: One Week Follow up with Primary Care Provider in 1 weeks. Follow up with Primary Care Provider in 1 weeks. Standing Status:   Standing     Number of Occurrences:   1     Standing Expiration Date:   5/29/2020     Order Specific Question:   Appointment in     Answer: One Week     Order Specific Question:   Appt Date: Answer:   5/28/2020     Order Specific Question:   Appt Time:     Answer:   9:32 AM     Order Specific Question:   Office Contact:     Answer:   d     Order Specific Question:   What provider will pt be seeing:     Answer:   d    FOLLOW UP VISIT Appointment in: Two Weeks orthopedics     orthopedics     Standing Status:   Standing     Number of Occurrences:   1     Standing Expiration Date:   5/29/2020     Order Specific Question:   Appointment in     Answer: Two Weeks     Order Specific Question:   Appt Date:      Answer:   5/28/2020     Order Specific Question:   Appt Time:     Answer:   9:34 AM     Order Specific Question:   Office Contact:     Answer:   ;     Order Specific Question:   What provider will pt be seeing: Answer:   ;       Signed By: Liz Cordoba MD     May 28, 2020

## 2020-05-28 NOTE — PROGRESS NOTES
Problem: Pressure Injury - Risk of  Goal: *Prevention of pressure injury  Description: Document Nick Scale and appropriate interventions in the flowsheet. Outcome: Progressing Towards Goal  Note: Pressure Injury Interventions:       Moisture Interventions: Absorbent underpads, Check for incontinence Q2 hours and as needed    Activity Interventions: Increase time out of bed    Mobility Interventions: Pressure redistribution bed/mattress (bed type)    Nutrition Interventions: Document food/fluid/supplement intake, Offer support with meals,snacks and hydration    Friction and Shear Interventions: Foam dressings/transparent film/skin sealants                Problem: Patient Education: Go to Patient Education Activity  Goal: Patient/Family Education  Outcome: Progressing Towards Goal     Problem: Falls - Risk of  Goal: *Absence of Falls  Description: Document Hood Fall Risk and appropriate interventions in the flowsheet.   Outcome: Progressing Towards Goal  Note: Fall Risk Interventions:  Mobility Interventions: Bed/chair exit alarm    Mentation Interventions: Bed/chair exit alarm    Medication Interventions: Bed/chair exit alarm    Elimination Interventions: Call light in reach    History of Falls Interventions: Bed/chair exit alarm, Door open when patient unattended         Problem: Patient Education: Go to Patient Education Activity  Goal: Patient/Family Education  Outcome: Progressing Towards Goal     Problem: Patient Education: Go to Patient Education Activity  Goal: Patient/Family Education  Outcome: Progressing Towards Goal     Problem: Pain  Goal: *Control of Pain  Outcome: Progressing Towards Goal     Problem: Patient Education: Go to Patient Education Activity  Goal: Patient/Family Education  Outcome: Progressing Towards Goal     Problem: Patient Education: Go to Patient Education Activity  Goal: Patient/Family Education  Outcome: Progressing Towards Goal     Problem: Patient Education: Go to Patient Education Activity  Goal: Patient/Family Education  Outcome: Progressing Towards Goal

## 2020-05-28 NOTE — PROGRESS NOTES
COVID results back and are negative. Pt is medically cleared for discharge today and will transfer to room 105D at [facility] for STR services. RN to call report to #[phone]. Transport scheduled for 1200pm through [employer]. SW spoke with pt's dtr, Michelle Cason, via phone, to notify her of pt's dc and transport time. SW remains available to assist as needed. Care Management Interventions  PCP Verified by CM: Yes(virtual visit)  Last Visit to PCP: 05/20/20  Mode of Transport at Discharge: [facility] Transport Time of Discharge: Jeane (CM Consult): SNF  Partner SNF: Yes  Discharge Durable Medical Equipment: No  Physical Therapy Consult: Yes  Occupational Therapy Consult: Yes  Speech Therapy Consult: No  Current Support Network: Own Home, Family Lives Michelle Fuller, lives with her and is her primary cg)  Confirm Follow Up Transport: Family  The Plan for Transition of Care is Related to the Following Treatment Goals : Subacute rehab for the pt to improve her functional ability for a safe transition to home.   The Patient and/or Patient Representative was Provided with a Choice of Provider and Agrees with the Discharge Plan?: Yes  Name of the Patient Representative Who was Provided with a Choice of Provider and Agrees with the Discharge Plan: Dina Rai  Freedom of Choice List was Provided with Basic Dialogue that Supports the Patient's Individualized Plan of Care/Goals, Treatment Preferences and Shares the Quality Data Associated with the Providers?: Yes  Discharge Location  Discharge Placement: Rehab Unit Subacute([facility])

## 2020-05-28 NOTE — PROGRESS NOTES
Problem: Self Care Deficits Care Plan (Adult)  Goal: *Acute Goals and Plan of Care (Insert Text)  Description:   1. Patient will complete lower body bathing and dressing with minimal assistance and adaptive equipment as needed. 2. Patient will complete toileting with minimal assistance. 3. Patient will tolerate 30 minutes of OT treatment with 2-3 rest breaks to increase activity tolerance for ADLs. 4. Patient will complete functional transfers with minimal assistance and adaptive equipment as needed. 5. Patient will complete functional mobility for household distances with minimal assistance and appropriate safety awareness. 5. Patient will tolerate standing sink side with minimal assistance while participating in grooming tasks. Timeframe: 7 visits      Outcome: Progressing Towards Goal     OCCUPATIONAL THERAPY: Daily Note and AM 5/28/2020  INPATIENT: OT Visit Days: 5  Payor: Madelyn Clemens / Plan: [employer] MEDICARE CHOICE PPO/PFFS / Product Type: [employer] Medicare /    L LE WBAT   NAME/AGE/GENDER: Sofia Toussaint is a 80 y.o. female   PRIMARY DIAGNOSIS:  Closed left hip fracture (HCC) [S72.002A]  Constipation [K59.00] Closed left hip fracture (HCC) Closed left hip fracture (HCC)  Procedure(s) (LRB):  FEMORAL HEAD FRACTURE OPEN REDUCTION INTERNAL FIXATION Left (Left)  7 Days Post-Op  ICD-10: Treatment Diagnosis:    · Pain in left hip (M25.552)  · Stiffness of Left Hip, Not elsewhere classified (M25.652)  · Generalized Muscle Weakness (M62.81)  · Other lack of cordination (R27.8)  · History of falling (Z91.81)   Precautions/Allergies:     Patient has no known allergies. ASSESSMENT:     Ms. Alek Benítez presents to the hospital after falling and sustaining L hip fx. Pt is s/p ORIF L hip and is WBAT in the LLE. Pt is alert and sitting up in the chair upon arrival. Pt is extremely pleasant and cooperative. Pt is limited with movement due to pain in her L hip. Pt is oriented to person and time.  Pt does appear to have some memory issues with patient tending to repeat herself throughout the assessment. Pt appears to have functional upper body strength for assisting with scooting to the edge of the chair with minimal assistance. 5/28/2020 Pt found seated in recliner chair upon arrival.  Alert and agreeable to be seen by OT. Pt with good static and dynamic seated balance. Pt with soiled brief and required Min A with use of RW for sit to stand. Upon standing, pt with fair (+) static and fair dynamic standing balance. Pt Max A for wiping and Max A to don diaper brief. Pt required CGA to walk from chair to sink. Pt required SBA with intermittent CGA to wash hands. Pt reports increased need to perform bowel movement. Pt requires CGA to walk from sink to [facility]. Pt able to doff diaper brief with CGA for balance and increased time. Pt requires CGA to complete transfer onto [facility]. Pt completes bowel movement and requires Min A with RW to transfer off of commode. Pt requires Max A for wiping. Pt requires Mod A to don diaper brief with CGA for balance. Pt requires CGA to walk from [facility] to recliner chair and to complete transfer to recliner chair. Pt left seated in recliner chair with all needs met and within reach. Will continue POC. This section established at most recent assessment   PROBLEM LIST (Impairments causing functional limitations):  1. Decreased Strength  2. Decreased ADL/Functional Activities  3. Decreased Transfer Abilities  4. Decreased Ambulation Ability/Technique  5. Decreased Balance  6. Increased Pain  7. Decreased Activity Tolerance  8. Decreased Flexibility/Joint Mobility  9. Decreased [place] with Home Exercise Program  10. Decreased Cognition   INTERVENTIONS PLANNED: (Benefits and precautions of occupational therapy have been discussed with the patient.)  1. Activities of daily living training  2. Adaptive equipment training  3. Balance training  4. Clothing management  5.  Cognitive training  6. Donning&doffing training  7. Neuromuscular re-eduation  8. Therapeutic activity  9. Therapeutic exercise     TREATMENT PLAN: Frequency/Duration: Follow patient 6 times per week to address above goals. Rehabilitation Potential For Stated Goals: Good     REHAB RECOMMENDATIONS (at time of discharge pending progress):    Placement: It is my opinion, based on this patient's performance to date, that Ms. Bola Soria may benefit from intensive therapy at a [address] [place] [address] after discharge due to the functional deficits listed above that are likely to improve with skilled rehabilitation and concerns that he/she may be unsafe to be unsupervised at home due to risk for falls and further functional decline. Equipment:    TBD               OCCUPATIONAL PROFILE AND HISTORY:   History of Present Injury/Illness (Reason for Referral):  See H&P  Past Medical History/Comorbidities:   Ms. Bola Soria  has a past medical history of Anxiety, Hematuria, microscopic, Hypercholesteremia, Hypertension, Hypothyroidism, IBS (irritable bowel syndrome), Osteoporosis, Sciatica, Short-term memory loss, and Sleep apnea. Ms. Bola Soria  has a past surgical history that includes hx hysterectomy; hx svt ablation; and hx vitrectomy (Bilateral). Social History/Living Environment:   Home Environment: Private residence  Living Alone: No  Support Systems: Family member(s), Child(marv)  Patient Expects to be Discharged to[de-identified] Unknown  Current DME Used/Available at Home: Shower chair, Walker, rolling  Tub or Shower Type: Shower  Prior Level of Function/Work/Activity:  Pt lives at home with her daughter that assists with her care. Pt reports she uses a rolling walker on occasion. Pt completes her own toileting needs and states she can dress herself. Pt completes a sponge bath or her daughter assist her into the shower. Personal Factors:          Age:  80 y.o.         Past/Current Experience:  recent fall and s/p L Hip ORIF           Number of Personal Factors/Comorbidities that affect the Plan of Care: Expanded review of therapy/medical records (1-2):  MODERATE COMPLEXITY   ASSESSMENT OF OCCUPATIONAL PERFORMANCE[de-identified]   Activities of Daily Living:   Basic ADLs (From Assessment) Complex ADLs (From Assessment)   Feeding: Stand-by assistance  Oral Facial Hygiene/Grooming: Minimum assistance  Bathing: Moderate assistance  Upper Body Dressing: Minimum assistance  Lower Body Dressing: Maximum assistance  Toileting: Maximum assistance Instrumental ADL  Meal Preparation: Total assistance  Homemaking: Total assistance   Grooming/Bathing/Dressing Activities of Daily Living                 Toileting  Toileting Assistance: Maximum assistance  Bowel Hygiene: Total assistance (dependent)  Clothing Management: Moderate assistance     Functional Transfers  Toilet Transfer : Minimum assistance     Bed/Mat Mobility  Supine to Sit: Contact guard assistance;Minimum assistance  Sit to Stand: Minimum assistance  Stand to Sit: Contact guard assistance  Scooting: Stand-by assistance     Most Recent Physical Functioning:   Gross Assessment:                  Posture:  Posture (WDL): Exceptions to WDL  Posture Assessment:  Forward head, Rounded shoulders, Trunk flexion  Balance:  Sitting: Intact  Sitting - Static: Good (unsupported)  Sitting - Dynamic: Good (unsupported)  Standing: Impaired  Standing - Static: Fair  Standing - Dynamic : Fair Bed Mobility:  Supine to Sit: Contact guard assistance;Minimum assistance  Scooting: Stand-by assistance  Duration: 9 Minutes  Wheelchair Mobility:     Transfers:  Sit to Stand: Minimum assistance  Stand to Sit: Contact guard assistance            Patient Vitals for the past 6 hrs:   BP BP Patient Position SpO2 Pulse   05/28/20 0700 119/72 At rest 99 % (!) 59   05/28/20 0753 111/64 Sitting 92 % 64       Mental Status  Neurologic State: Alert  Orientation Level: Oriented to person, Oriented to place, Oriented to situation(periodic confusion)  Cognition: Follows commands  Perception: Verbal, Tactile  Perseveration: Verbal cues provided, Tactile cues provided  Safety/Judgement: Fall prevention                          Physical Skills Involved:  1. Range of Motion  2. Balance  3. Strength  4. Activity Tolerance  5. Pain (acute) Cognitive Skills Affected (resulting in the inability to perform in a timely and safe manner):  1. Executive Function  2. Short Term Recall Psychosocial Skills Affected:  1. Habits/Routines  2. Environmental Adaptation  3. Self-Awareness   Number of elements that affect the Plan of Care: 5+:  HIGH COMPLEXITY   CLINICAL DECISION MAKING:   [facility] AM-PAC 6 Clicks   Daily Activity Inpatient Short Form  How much help from another person does the patient currently need. .. Total A Lot A Little None   1. Putting on and taking off regular lower body clothing? [] 1   [x] 2   [] 3   [] 4   2. Bathing (including washing, rinsing, drying)? [] 1   [x] 2   [] 3   [] 4   3. Toileting, which includes using toilet, bedpan or urinal?   [] 1   [x] 2   [] 3   [] 4   4. Putting on and taking off regular upper body clothing? [] 1   [] 2   [x] 3   [] 4   5. Taking care of personal grooming such as brushing teeth? [] 1   [] 2   [x] 3   [] 4   6. Eating meals? [] 1   [] 2   [x] 3   [] 4   © 2007, Trustees of [facility], under license to [employer]. All rights reserved      Score:  Initial: 15 Most Recent: X (Date: -- )    Interpretation of Tool:  Represents activities that are increasingly more difficult (i.e. Bed mobility, Transfers, Gait). Medical Necessity:     · Patient demonstrates   · good and excellent  ·  rehab potential due to higher previous functional level. Reason for Services/Other Comments:  · Patient continues to require skilled intervention due to   · Decreased independence with ADL/functional transfers.     · .   Use of outcome tool(s) and clinical judgement create a POC that gives a: LOW COMPLEXITY         TREATMENT:   (In addition to Assessment/Re-Assessment sessions the following treatments were rendered)     Pre-treatment Symptoms/Complaints:  Pt with decreased pain compared to prior OT session. Pain: Initial:   Pain Intensity 1: 3  Pain Location 1: Hip  Pain Orientation 1: Left  Post Session:  Unchanged     Self Care: (26 minutes): Procedure(s) utilized to improve and/or restore self-care/home management as related to toileting and grooming. Required minimal to moderate visual, verbal and manual cueing to facilitate activities of daily living skills. Pt with soiled brief and required Min A with use of RW for sit to stand. Upon standing, pt with fair (+) static and fair dynamic standing balance. Pt Max A for wiping and Max A to don diaper brief. Pt required CGA to walk from chair to sink. Pt required SBA with intermittent CGA to wash hands. Pt reports increased need to perform bowel movement. Pt requires CGA to walk from sink to [facility]. Pt able to doff diaper brief with CGA for balance and increased time. Pt requires CGA to complete transfer onto [facility]. Pt completes bowel movement and requires Min A with RW to transfer off of commode. Pt requires Max A for wiping. Pt requires Mod A to don diaper brief with CGA for balance. Pt requires CGA to walk from [facility] to recliner chair and to complete transfer to recliner chair. Braces/Orthotics/Lines/Etc:   · O2 Device: Room air  Treatment/Session Assessment:    · Response to Treatment:  Pt with decreased pain this session. Good participant and very pleasant. · Interdisciplinary Collaboration:   o Occupational Therapist  o Registered Nurse  o Rehabilitation Attendant  · After treatment position/precautions:   o Supine in bed  o Bed alarm/tab alert on  o Bed/Chair-wheels locked  o Bed in low position  o Call light within reach  o RN notified  o Head of bed elevated to increased safety for self-feeding.    · Compliance with Program/Exercises: Compliant most of the time, Will assess as treatment progresses. · Recommendations/Intent for next treatment session: \"Next visit will focus on advancements to more challenging activities and reduction in assistance provided\".   Total Treatment Duration:  OT Patient Time In/Time Out  Time In: 0958  Time Out: 1024     Raiza Gipson OTR/L

## 2020-05-28 NOTE — PROGRESS NOTES
Problem: Mobility Impaired (Adult and Pediatric)  Goal: *Acute Goals and Plan of Care (Insert Text)  Description: LTG:  (1.)Ms. Laurie Villafuerte will move from supine to sit and sit to supine, scoot up and down and roll side to side with STANDBY ASSIST within 7 treatment day(s). (2.)Ms. Laurie Villafuerte will transfer from bed to chair and chair to bed with STANDBY ASSIST using the least restrictive device within 7 treatment day(s). (3.)Ms. Laurie Villafuerte will ambulate with STANDBY ASSIST for 10 feet with the least restrictive device within 7 treatment day(s). (4.)Ms. Laurie Villafuerte will perform exercises per HEP for 15+ minutes to improve strength and mobility within 7 days. ________________________________________________________________________________________________   Outcome: Progressing Towards Goal     PHYSICAL THERAPY: Daily Note, Re-evaluation and AM 5/28/2020  INPATIENT: PT Visit Days : 1  Payor: Gayle Javier / Plan: [id number] PPO/PFFS / Product Type: Managed Care Medicare /       NAME/AGE/GENDER: Gina Mclean is a 80 y.o. female   PRIMARY DIAGNOSIS: Closed left hip fracture (HCC) [S72.002A]  Constipation [K59.00] Closed left hip fracture (HCC) Closed left hip fracture (HCC)  Procedure(s) (LRB):  FEMORAL HEAD FRACTURE OPEN REDUCTION INTERNAL FIXATION Left (Left)  7 Days Post-Op  ICD-10: Treatment Diagnosis:    · Generalized Muscle Weakness (M62.81)  · Difficulty in walking, Not elsewhere classified (R26.2)  · Other abnormalities of gait and mobility (R26.89)  · History of falling (Z91.81)   Precaution/Allergies:  Patient has no known allergies. ASSESSMENT:     Ms. Laurie Villafuerte is a pleasantly confused 80year old female admitted from home with left hip fracture and is seen s/p ORIF. WBAT per ortho orders. Per chart review, pt lives with daughter who is her caregiver. She is typically ambulatory for household distances slowly without DME use, does have cane available.      5/28: Pnt re-evaluated and goals updated. The patient is supine in bed upon contact and agreeable to PT treatment. The patient performed bed mobility with min A-CGA for mobilizing the LE and supine to sit. She demonstrated good sitting balance at the edge of the bed and scooted forward with SBA. The patient performed sit to stand with min A and verbal cueing and then ambulated 30' w/ RW, additional time, and min A-CGA out into the esquivel and back into the room to bedside chair. The patient  was positioned for comfort with needs in reach. Overall the patient is making slow, steady progress toward therapy goals as indicated by increased gait distances. Will continue skilled PT treatment as patient is still below functional baseline. This section established at most recent assessment   PROBLEM LIST (Impairments causing functional limitations):  1. Decreased Strength  2. Decreased Transfer Abilities  3. Decreased Ambulation Ability/Technique  4. Decreased Balance  5. Increased Pain  6. Decreased Activity Tolerance  7. Decreased Pacing Skills  8. Decreased Flexibility/Joint Mobility  9. Decreased Knowledge of Precautions  10. Decreased Skin Integrity/Hygeine  11. Decreased Cognition   INTERVENTIONS PLANNED: (Benefits and precautions of physical therapy have been discussed with the patient.)  1. Balance Exercise  2. Bed Mobility  3. Gait Training  4. Home Exercise Program (HEP)  5. Therapeutic Activites  6. Therapeutic Exercise/Strengthening  7. Transfer Training     TREATMENT PLAN: Frequency/Duration: 3 times a week for duration of hospital stay  Rehabilitation Potential For Stated Goals: Good     REHAB RECOMMENDATIONS (at time of discharge pending progress):    Placement: It is my opinion, based on this patient's performance to date, that Ms. Jeni Dove may benefit from intensive therapy at a [address] [place] [address] after discharge due to the functional deficits listed above that are likely to improve with skilled rehabilitation and concerns that he/she may be unsafe to be unsupervised at home due to weakness, fall risk, need for heavy assistance. Equipment:    tbd pending progress              HISTORY:   History of Present Injury/Illness (Reason for Referral):  Per H&P, \"80year-old  female patient with a known history of hypertension, dementia, hypothyroidism and osteoporosis. Patient was seen yesterday in emergency room for history of fall. She had 1 more fall last night and brought to the emergency room again for further evaluation . Patient has been complaining of left hip pain.     Apart from fall and left hip pain of the 10 directions apart from the one mentioned above patient other review of systems were negative noncontributory.     WBC of 11.5, creatinine of 1.28, GFR of 50. Left hip x-ray-  Impression: Question acute nondisplaced transcervical fracture of the left  femoral neck.  MRI of the left hip without contrast may be more sensitive in  Evaluation.     Patient will be transferred to [facility] for further management of left hip fracture and constipation(based on the x-ray KUB done yesterday). \"  Past Medical History/Comorbidities:   Ms. Alek Benítez  has a past medical history of Anxiety, Hematuria, microscopic, Hypercholesteremia, Hypertension, Hypothyroidism, IBS (irritable bowel syndrome), Osteoporosis, Sciatica, Short-term memory loss, and Sleep apnea. Ms. Alek Beíntez  has a past surgical history that includes hx hysterectomy; hx svt ablation; and hx vitrectomy (Bilateral). Social History/Living Environment:   Home Environment: Private residence  Living Alone: No  Support Systems: Family member(s), Child(marv)  Patient Expects to be Discharged to[de-identified] Unknown  Current DME Used/Available at Home: Shower chair, Walker, rolling  Tub or Shower Type: Shower  Prior Level of Function/Work/Activity:  Lives with daughter who is her caregiver and assists with ADLs, mobility. Per chart review, pt has walker but does not typically use. Ambulates slowly in hallway without DME use. Number of Personal Factors/Comorbidities that affect the Plan of Care: 3+: HIGH COMPLEXITY   EXAMINATION:   Most Recent Physical Functioning:   Gross Assessment:  AROM: Generally decreased, functional  PROM: Generally decreased, functional  Strength: Generally decreased, functional               Posture:     Balance:  Sitting: Intact  Standing: Impaired  Standing - Static: Fair  Standing - Dynamic : Fair Bed Mobility:  Supine to Sit: Contact guard assistance;Minimum assistance  Scooting: Stand-by assistance  Duration: 9 Minutes  Wheelchair Mobility:     Transfers:  Sit to Stand: Minimum assistance  Stand to Sit: Contact guard assistance  Gait:     Base of Support: Shift to right  Speed/Manasa: Slow;Shuffled  Gait Abnormalities: Trunk sway increased;Decreased step clearance  Distance (ft): 30 Feet (ft)  Assistive Device: Walker, rolling;Gait belt  Ambulation - Level of Assistance: Contact guard assistance      Body Structures Involved:  1. Eyes and Ears  2. Bones  3. Joints  4. Muscles Body Functions Affected:  1. Mental  2. Sensory/Pain  3. Neuromusculoskeletal  4. Movement Related Activities and Participation Affected:  1. General Tasks and Demands  2. Mobility  3. Domestic Life  4. Community, Social and [place]   Number of elements that affect the Plan of Care: 4+: HIGH COMPLEXITY   CLINICAL PRESENTATION:   Presentation: Evolving clinical presentation with changing clinical characteristics: MODERATE COMPLEXITY   CLINICAL DECISION MAKIN [facility] Inpatient Short Form  How much difficulty does the patient currently have. .. Unable A Lot A Little None   1. Turning over in bed (including adjusting bedclothes, sheets and blankets)? [] 1   [] 2   [x] 3   [] 4   2. Sitting down on and standing up from a chair with arms ( e.g., wheelchair, bedside commode, etc.)   [] 1   [] 2   [x] 3   [] 4   3.   Moving from lying on back to sitting on the side of the bed? [] 1   [] 2   [x] 3   [] 4   How much help from another person does the patient currently need. .. Total A Lot A Little None   4. Moving to and from a bed to a chair (including a wheelchair)? [] 1   [] 2   [x] 3   [] 4   5. Need to walk in hospital room? [] 1   [] 2   [x] 3   [] 4   6. Climbing 3-5 steps with a railing? [x] 1   [] 2   [] 3   [] 4   © 2007, Trustees of [address] [place] [address] [address] [address], under license to [email or web address]. All rights reserved      Score:  Initial: 10 Most Recent: 16 (Date: -- )    Interpretation of Tool:  Represents activities that are increasingly more difficult (i.e. Bed mobility, Transfers, Gait). Medical Necessity:     · Patient demonstrates good rehab potential due to higher previous functional level. Reason for Services/Other Comments:  · Patient continues to demonstrate capacity to improve strength, mobility, balance, transfers, and activity tolerance which will increase independence, decrease amount of assistance required from caregiver and increase safety. Use of outcome tool(s) and clinical judgement create a POC that gives a: Questionable prediction of patient's progress: MODERATE COMPLEXITY            TREATMENT:   (In addition to Assessment/Re-Assessment sessions the following treatments were rendered)   Pre-treatment Symptoms/Complaints: \"Hello\"  Pain: Initial:   Pain Intensity 1: 5  Post Session:  0/10         Therapeutic Activity: (9 Minutes  min ): Therapeutic activities including Chair transfers, patient education, static/dynamic standing balance, and Ambulation on level ground to improve mobility, strength, balance, coordination and activity tolerance. Required minimal assist   to promote static and dynamic balance in standing and promote motor control of bilateral, lower extremity(s).        Most Recent Physical Functioning:   Gross Assessment:  AROM: Generally decreased, functional  PROM: Generally decreased, functional  Strength: Generally decreased, functional  Coordination: Generally decreased, functional  Posture:  Posture (WDL): Exceptions to WDL  Posture Assessment: Forward head, Rounded shoulders, Trunk flexion  Balance:  Sitting: Intact  Standing: Impaired  Standing - Static: Fair  Standing - Dynamic : Fair  Bed Mobility:  Supine to Sit: Contact guard assistance;Minimum assistance  Scooting: Stand-by assistance  Duration: 9 Minutes  Wheelchair Mobility:     Transfers:  Sit to Stand: Minimum assistance  Stand to Sit: Contact guard assistance  Gait:  Base of Support: Shift to right  Speed/Manasa: Slow;Shuffled  Gait Abnormalities: Trunk sway increased;Decreased step clearance  Ambulation - Level of Assistance: Contact guard assistance  Distance (ft): 30 Feet (ft)  Assistive Device: Walker, rolling;Gait belt  Gait:  Right Side Weight Bearing: Full  Left Side Weight Bearing: As tolerated  Base of Support: Shift to right  Speed/Manasa: Slow;Shuffled  Gait Abnormalities: Trunk sway increased;Decreased step clearance  Distance (ft): 30 Feet (ft)  Assistive Device: Walker, rolling;Gait belt  Ambulation - Level of Assistance: Contact guard assistance         Therapeutic Exercise: (   min):  Exercises per grid below to improve mobility and strength. Required moderate verbal and tactile cues to promote proper body alignment, promote proper body posture and promote proper body mechanics. Progressed resistance, range and repetitions as indicated.      Date:  5/23 Date:  5/25 Date:  5/26   Activity/Exercise Seated Parameters Parameters Parameters   Heel raises X 20 B x20 x20   Toe raises X 20 B x20 x20   LAQ's X 20 B x20 2x10   Hip Flex X 20 B x10 2x10   Hip ABD X 20 B     Sit to stands   x5                   Braces/Orthotics/Lines/Etc:   · O2 Device: Room air  Treatment/Session Assessment:    · Response to Treatment:  See above    · Interdisciplinary Collaboration:   o Physical Therapist  o Registered Nurse  · After treatment position/precautions:   o Up in chair  o Bed/Chair-wheels locked  o Bed in low position  o Call light within reach  o RN notified   · Compliance with Program/Exercises: Will assess as treatment progresses  · Recommendations/Intent for next treatment session: \"Next visit will focus on advancements to more challenging activities and reduction in assistance provided\".   Total Treatment Duration:  PT Patient Time In/Time Out  Time In: Alexandrea Valencia 1950  Time Out: [address] Rhoda Canas

## 2020-05-28 NOTE — ROUTINE PROCESS
TRANSFER - OUT REPORT: 
 
Verbal report given to [facility] RN(name) on Marta Moya  [facility] care Report consisted of patients Situation, Background, Assessment and  
Recommendations(SBAR). Information from the following report(s) SBAR and Kardex was reviewed with the receiving nurse. Lines:    
 
Opportunity for questions and clarification was provided. Patient transported with: 
Patient belongings

## 2020-06-12 ENCOUNTER — PATIENT OUTREACH (OUTPATIENT)
Dept: CASE MANAGEMENT | Age: 85
End: 2020-06-12

## 2020-06-12 NOTE — PROGRESS NOTES
Community Care Team documentation for patient in [facility]    The information below provided by:Qiana  PT Update: Min A for bed mob and transfers, ambulates 25 feet Min A with FWW, UB Adls Min A and LB Mod A to Max A        Nursing Update:TX to sacral wound; ABT 2 pneumonia; fall 6/2 added bed alarm    Discharge Date:TBD    Assign to [address]

## 2020-06-22 ENCOUNTER — PATIENT OUTREACH (OUTPATIENT)
Dept: CASE MANAGEMENT | Age: 85
End: 2020-06-22

## 2020-06-22 NOTE — PROGRESS NOTES
Community Care Team documentation for patient in [facility]    The information below provided by:Qiana    PT Update: Min A for bed mob and transfers, ambulates 25 feet Min A with FWW, UB Adls Min A and LB Mod A to Max A      Nursing Update:stable      Discharge Date:TBD      Assign to [id number]

## 2022-03-18 PROBLEM — I10 ESSENTIAL HYPERTENSION: Status: ACTIVE | Noted: 2020-05-20

## 2022-03-19 PROBLEM — S72.002A CLOSED LEFT HIP FRACTURE (HCC): Status: ACTIVE | Noted: 2020-05-20

## 2022-03-19 PROBLEM — K59.00 CONSTIPATION: Status: ACTIVE | Noted: 2020-05-20

## 2022-03-19 PROBLEM — E03.9 ACQUIRED HYPOTHYROIDISM: Status: ACTIVE | Noted: 2020-05-20

## 2022-03-19 PROBLEM — F03.90 DEMENTIA (HCC): Status: ACTIVE | Noted: 2020-05-20

## 2022-03-19 PROBLEM — N18.30 CKD (CHRONIC KIDNEY DISEASE) STAGE 3, GFR 30-59 ML/MIN (HCC): Status: ACTIVE | Noted: 2020-05-20

## 2022-03-20 PROBLEM — F32.A DEPRESSION: Status: ACTIVE | Noted: 2020-05-20

## 2022-08-20 PROCEDURE — 99285 EMERGENCY DEPT VISIT HI MDM: CPT

## 2022-08-21 ENCOUNTER — APPOINTMENT (OUTPATIENT)
Dept: GENERAL RADIOLOGY | Age: 87
DRG: 480 | End: 2022-08-21
Payer: MEDICARE

## 2022-08-21 ENCOUNTER — APPOINTMENT (OUTPATIENT)
Dept: CT IMAGING | Age: 87
DRG: 480 | End: 2022-08-21
Payer: MEDICARE

## 2022-08-21 ENCOUNTER — HOSPITAL ENCOUNTER (INPATIENT)
Age: 87
LOS: 8 days | Discharge: NURSING, REHAB OR LONG-TERM CARE | DRG: 480 | End: 2022-08-29
Attending: EMERGENCY MEDICINE | Admitting: INTERNAL MEDICINE
Payer: MEDICARE

## 2022-08-21 ENCOUNTER — ANESTHESIA EVENT (OUTPATIENT)
Dept: SURGERY | Age: 87
DRG: 480 | End: 2022-08-21
Payer: MEDICARE

## 2022-08-21 ENCOUNTER — ANESTHESIA (OUTPATIENT)
Dept: SURGERY | Age: 87
DRG: 480 | End: 2022-08-21
Payer: MEDICARE
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
## 2022-08-21 DIAGNOSIS — S72.001A CLOSED FRACTURE OF NECK OF RIGHT FEMUR, INITIAL ENCOUNTER (HCC): Primary | ICD-10-CM

## 2022-08-21 PROBLEM — E03.9 ACQUIRED HYPOTHYROIDISM: Status: ACTIVE | Noted: 2020-05-20

## 2022-08-21 PROBLEM — S72.002A HIP FRACTURE REQUIRING OPERATIVE REPAIR, LEFT, CLOSED, INITIAL ENCOUNTER (HCC): Status: ACTIVE | Noted: 2022-08-21

## 2022-08-21 PROBLEM — I10 ESSENTIAL HYPERTENSION: Status: ACTIVE | Noted: 2020-05-20

## 2022-08-21 LAB
ABO + RH BLD: NORMAL
ALBUMIN SERPL-MCNC: 3.3 G/DL (ref 3.2–4.6)
ALBUMIN/GLOB SERPL: 0.7 {RATIO} (ref 1.2–3.5)
ALP SERPL-CCNC: 69 U/L (ref 50–136)
ALT SERPL-CCNC: 30 U/L (ref 12–65)
ANION GAP SERPL CALC-SCNC: 2 MMOL/L (ref 7–16)
ANION GAP SERPL CALC-SCNC: 8 MMOL/L (ref 7–16)
AST SERPL-CCNC: 36 U/L (ref 15–37)
BILIRUB SERPL-MCNC: 0.7 MG/DL (ref 0.2–1.1)
BLOOD GROUP ANTIBODIES SERPL: NORMAL
BUN SERPL-MCNC: 22 MG/DL (ref 8–23)
BUN SERPL-MCNC: 24 MG/DL (ref 8–23)
CALCIUM SERPL-MCNC: 8.8 MG/DL (ref 8.3–10.4)
CALCIUM SERPL-MCNC: 9.4 MG/DL (ref 8.3–10.4)
CHLORIDE SERPL-SCNC: 106 MMOL/L (ref 98–107)
CHLORIDE SERPL-SCNC: 107 MMOL/L (ref 98–107)
CO2 SERPL-SCNC: 24 MMOL/L (ref 21–32)
CO2 SERPL-SCNC: 25 MMOL/L (ref 21–32)
CREAT SERPL-MCNC: 1.1 MG/DL (ref 0.6–1)
CREAT SERPL-MCNC: 1.1 MG/DL (ref 0.6–1)
ERYTHROCYTE [DISTWIDTH] IN BLOOD BY AUTOMATED COUNT: 14.5 % (ref 11.9–14.6)
GLOBULIN SER CALC-MCNC: 4.6 G/DL (ref 2.3–3.5)
GLUCOSE SERPL-MCNC: 135 MG/DL (ref 65–100)
GLUCOSE SERPL-MCNC: 89 MG/DL (ref 65–100)
HCT VFR BLD AUTO: 41 % (ref 35.8–46.3)
HGB BLD-MCNC: 13.1 G/DL (ref 11.7–15.4)
MCH RBC QN AUTO: 27.8 PG (ref 26.1–32.9)
MCHC RBC AUTO-ENTMCNC: 32 G/DL (ref 31.4–35)
MCV RBC AUTO: 87 FL (ref 79.6–97.8)
NRBC # BLD: 0 K/UL (ref 0–0.2)
PLATELET # BLD AUTO: 265 K/UL (ref 150–450)
PMV BLD AUTO: 11.6 FL (ref 9.4–12.3)
POTASSIUM SERPL-SCNC: 3.9 MMOL/L (ref 3.5–5.1)
POTASSIUM SERPL-SCNC: 4.8 MMOL/L (ref 3.5–5.1)
PROT SERPL-MCNC: 7.9 G/DL (ref 6.3–8.2)
RBC # BLD AUTO: 4.71 M/UL (ref 4.05–5.2)
SODIUM SERPL-SCNC: 134 MMOL/L (ref 136–145)
SODIUM SERPL-SCNC: 138 MMOL/L (ref 136–145)
SPECIMEN EXP DATE BLD: NORMAL
WBC # BLD AUTO: 9.7 K/UL (ref 4.3–11.1)

## 2022-08-21 PROCEDURE — 2580000003 HC RX 258: Performed by: ANESTHESIOLOGY

## 2022-08-21 PROCEDURE — 6370000000 HC RX 637 (ALT 250 FOR IP): Performed by: EMERGENCY MEDICINE

## 2022-08-21 PROCEDURE — 85027 COMPLETE CBC AUTOMATED: CPT

## 2022-08-21 PROCEDURE — 73502 X-RAY EXAM HIP UNI 2-3 VIEWS: CPT

## 2022-08-21 PROCEDURE — C1713 ANCHOR/SCREW BN/BN,TIS/BN: HCPCS | Performed by: ORTHOPAEDIC SURGERY

## 2022-08-21 PROCEDURE — 3600000004 HC SURGERY LEVEL 4 BASE: Performed by: ORTHOPAEDIC SURGERY

## 2022-08-21 PROCEDURE — 73700 CT LOWER EXTREMITY W/O DYE: CPT

## 2022-08-21 PROCEDURE — 1100000000 HC RM PRIVATE

## 2022-08-21 PROCEDURE — 3600000014 HC SURGERY LEVEL 4 ADDTL 15MIN: Performed by: ORTHOPAEDIC SURGERY

## 2022-08-21 PROCEDURE — 3700000000 HC ANESTHESIA ATTENDED CARE: Performed by: ORTHOPAEDIC SURGERY

## 2022-08-21 PROCEDURE — 7100000000 HC PACU RECOVERY - FIRST 15 MIN: Performed by: ORTHOPAEDIC SURGERY

## 2022-08-21 PROCEDURE — 2709999900 HC NON-CHARGEABLE SUPPLY: Performed by: ORTHOPAEDIC SURGERY

## 2022-08-21 PROCEDURE — C1769 GUIDE WIRE: HCPCS | Performed by: ORTHOPAEDIC SURGERY

## 2022-08-21 PROCEDURE — 36415 COLL VENOUS BLD VENIPUNCTURE: CPT

## 2022-08-21 PROCEDURE — 0QS604Z REPOSITION RIGHT UPPER FEMUR WITH INTERNAL FIXATION DEVICE, OPEN APPROACH: ICD-10-PCS | Performed by: ORTHOPAEDIC SURGERY

## 2022-08-21 PROCEDURE — 3700000001 HC ADD 15 MINUTES (ANESTHESIA): Performed by: ORTHOPAEDIC SURGERY

## 2022-08-21 PROCEDURE — 2720000010 HC SURG SUPPLY STERILE: Performed by: ORTHOPAEDIC SURGERY

## 2022-08-21 PROCEDURE — 86901 BLOOD TYPING SEROLOGIC RH(D): CPT

## 2022-08-21 PROCEDURE — 6360000002 HC RX W HCPCS: Performed by: ORTHOPAEDIC SURGERY

## 2022-08-21 PROCEDURE — 6360000002 HC RX W HCPCS: Performed by: NURSE ANESTHETIST, CERTIFIED REGISTERED

## 2022-08-21 PROCEDURE — 80053 COMPREHEN METABOLIC PANEL: CPT

## 2022-08-21 PROCEDURE — 7100000001 HC PACU RECOVERY - ADDTL 15 MIN: Performed by: ORTHOPAEDIC SURGERY

## 2022-08-21 PROCEDURE — 2500000003 HC RX 250 WO HCPCS: Performed by: ORTHOPAEDIC SURGERY

## 2022-08-21 PROCEDURE — 2580000003 HC RX 258: Performed by: NURSE ANESTHETIST, CERTIFIED REGISTERED

## 2022-08-21 PROCEDURE — 73501 X-RAY EXAM HIP UNI 1 VIEW: CPT

## 2022-08-21 PROCEDURE — 2500000003 HC RX 250 WO HCPCS: Performed by: NURSE ANESTHETIST, CERTIFIED REGISTERED

## 2022-08-21 PROCEDURE — 6370000000 HC RX 637 (ALT 250 FOR IP): Performed by: ORTHOPAEDIC SURGERY

## 2022-08-21 PROCEDURE — 27236 TREAT THIGH FRACTURE: CPT | Performed by: ORTHOPAEDIC SURGERY

## 2022-08-21 PROCEDURE — 2580000003 HC RX 258: Performed by: INTERNAL MEDICINE

## 2022-08-21 DEVICE — SCREW BONE L90MM DIA6.4MM BLU TI ALLOY ANTIROTATION T25: Type: IMPLANTABLE DEVICE | Site: HIP | Status: FUNCTIONAL

## 2022-08-21 DEVICE — SCREW BONE L42MM DIA5MM TI ALLOY LCK ST W/ T25 STARDRV: Type: IMPLANTABLE DEVICE | Site: HIP | Status: FUNCTIONAL

## 2022-08-21 DEVICE — PLATE BONE 1 H TI ALLOY FOR FEM NK SYS: Type: IMPLANTABLE DEVICE | Site: HIP | Status: FUNCTIONAL

## 2022-08-21 DEVICE — BOLT BONE L90MM DIA10MM GLD TI ALLOY FOR FEM NK SYS: Type: IMPLANTABLE DEVICE | Site: HIP | Status: FUNCTIONAL

## 2022-08-21 RX ORDER — DEXAMETHASONE SODIUM PHOSPHATE 4 MG/ML
INJECTION, SOLUTION INTRA-ARTICULAR; INTRALESIONAL; INTRAMUSCULAR; INTRAVENOUS; SOFT TISSUE PRN
Status: DISCONTINUED | OUTPATIENT
Start: 2022-08-21 | End: 2022-08-21 | Stop reason: SDUPTHER

## 2022-08-21 RX ORDER — TRAZODONE HYDROCHLORIDE 100 MG/1
100 TABLET ORAL NIGHTLY
COMMUNITY

## 2022-08-21 RX ORDER — HYDROMORPHONE HYDROCHLORIDE 2 MG/ML
0.5 INJECTION, SOLUTION INTRAMUSCULAR; INTRAVENOUS; SUBCUTANEOUS EVERY 5 MIN PRN
Status: DISCONTINUED | OUTPATIENT
Start: 2022-08-21 | End: 2022-08-21 | Stop reason: HOSPADM

## 2022-08-21 RX ORDER — SODIUM CHLORIDE 0.9 % (FLUSH) 0.9 %
5-40 SYRINGE (ML) INJECTION PRN
Status: DISCONTINUED | OUTPATIENT
Start: 2022-08-21 | End: 2022-08-29 | Stop reason: HOSPADM

## 2022-08-21 RX ORDER — ACETAMINOPHEN 325 MG/1
650 TABLET ORAL EVERY 6 HOURS PRN
Status: DISCONTINUED | OUTPATIENT
Start: 2022-08-21 | End: 2022-08-29 | Stop reason: HOSPADM

## 2022-08-21 RX ORDER — TRAZODONE HYDROCHLORIDE 50 MG/1
100 TABLET ORAL NIGHTLY
Status: DISCONTINUED | OUTPATIENT
Start: 2022-08-21 | End: 2022-08-29 | Stop reason: HOSPADM

## 2022-08-21 RX ORDER — ONDANSETRON 4 MG/1
4 TABLET, ORALLY DISINTEGRATING ORAL EVERY 8 HOURS PRN
Status: DISCONTINUED | OUTPATIENT
Start: 2022-08-21 | End: 2022-08-29 | Stop reason: HOSPADM

## 2022-08-21 RX ORDER — ONDANSETRON 2 MG/ML
4 INJECTION INTRAMUSCULAR; INTRAVENOUS
Status: DISCONTINUED | OUTPATIENT
Start: 2022-08-21 | End: 2022-08-21 | Stop reason: HOSPADM

## 2022-08-21 RX ORDER — SODIUM CHLORIDE 9 MG/ML
INJECTION, SOLUTION INTRAVENOUS PRN
Status: DISCONTINUED | OUTPATIENT
Start: 2022-08-21 | End: 2022-08-29 | Stop reason: HOSPADM

## 2022-08-21 RX ORDER — SODIUM CHLORIDE, SODIUM LACTATE, POTASSIUM CHLORIDE, CALCIUM CHLORIDE 600; 310; 30; 20 MG/100ML; MG/100ML; MG/100ML; MG/100ML
INJECTION, SOLUTION INTRAVENOUS CONTINUOUS
Status: DISCONTINUED | OUTPATIENT
Start: 2022-08-21 | End: 2022-08-23

## 2022-08-21 RX ORDER — ONDANSETRON 2 MG/ML
INJECTION INTRAMUSCULAR; INTRAVENOUS PRN
Status: DISCONTINUED | OUTPATIENT
Start: 2022-08-21 | End: 2022-08-21 | Stop reason: SDUPTHER

## 2022-08-21 RX ORDER — EPHEDRINE SULFATE/0.9% NACL/PF 50 MG/5 ML
SYRINGE (ML) INTRAVENOUS PRN
Status: DISCONTINUED | OUTPATIENT
Start: 2022-08-21 | End: 2022-08-21 | Stop reason: SDUPTHER

## 2022-08-21 RX ORDER — OXYCODONE HYDROCHLORIDE 5 MG/1
5 TABLET ORAL PRN
Status: DISCONTINUED | OUTPATIENT
Start: 2022-08-21 | End: 2022-08-21 | Stop reason: HOSPADM

## 2022-08-21 RX ORDER — PROPOFOL 10 MG/ML
INJECTION, EMULSION INTRAVENOUS PRN
Status: DISCONTINUED | OUTPATIENT
Start: 2022-08-21 | End: 2022-08-21 | Stop reason: SDUPTHER

## 2022-08-21 RX ORDER — LIDOCAINE HYDROCHLORIDE 20 MG/ML
INJECTION, SOLUTION EPIDURAL; INFILTRATION; INTRACAUDAL; PERINEURAL PRN
Status: DISCONTINUED | OUTPATIENT
Start: 2022-08-21 | End: 2022-08-21 | Stop reason: SDUPTHER

## 2022-08-21 RX ORDER — ONDANSETRON 2 MG/ML
4 INJECTION INTRAMUSCULAR; INTRAVENOUS EVERY 6 HOURS PRN
Status: DISCONTINUED | OUTPATIENT
Start: 2022-08-21 | End: 2022-08-29 | Stop reason: HOSPADM

## 2022-08-21 RX ORDER — SODIUM CHLORIDE 9 MG/ML
INJECTION, SOLUTION INTRAVENOUS PRN
Status: DISCONTINUED | OUTPATIENT
Start: 2022-08-21 | End: 2022-08-21 | Stop reason: HOSPADM

## 2022-08-21 RX ORDER — POLYETHYLENE GLYCOL 3350 17 G/17G
17 POWDER, FOR SOLUTION ORAL DAILY PRN
Status: DISCONTINUED | OUTPATIENT
Start: 2022-08-21 | End: 2022-08-29 | Stop reason: HOSPADM

## 2022-08-21 RX ORDER — SODIUM CHLORIDE 0.9 % (FLUSH) 0.9 %
5-40 SYRINGE (ML) INJECTION EVERY 12 HOURS SCHEDULED
Status: DISCONTINUED | OUTPATIENT
Start: 2022-08-21 | End: 2022-08-21 | Stop reason: HOSPADM

## 2022-08-21 RX ORDER — METOPROLOL SUCCINATE 50 MG/1
50 TABLET, EXTENDED RELEASE ORAL DAILY
COMMUNITY

## 2022-08-21 RX ORDER — SODIUM CHLORIDE 0.9 % (FLUSH) 0.9 %
5-40 SYRINGE (ML) INJECTION PRN
Status: DISCONTINUED | OUTPATIENT
Start: 2022-08-21 | End: 2022-08-21 | Stop reason: HOSPADM

## 2022-08-21 RX ORDER — SODIUM CHLORIDE, SODIUM LACTATE, POTASSIUM CHLORIDE, CALCIUM CHLORIDE 600; 310; 30; 20 MG/100ML; MG/100ML; MG/100ML; MG/100ML
INJECTION, SOLUTION INTRAVENOUS CONTINUOUS PRN
Status: DISCONTINUED | OUTPATIENT
Start: 2022-08-21 | End: 2022-08-21 | Stop reason: SDUPTHER

## 2022-08-21 RX ORDER — PROCHLORPERAZINE EDISYLATE 5 MG/ML
5 INJECTION INTRAMUSCULAR; INTRAVENOUS
Status: DISCONTINUED | OUTPATIENT
Start: 2022-08-21 | End: 2022-08-21 | Stop reason: HOSPADM

## 2022-08-21 RX ORDER — POTASSIUM CHLORIDE 1.5 G/1.77G
20 POWDER, FOR SOLUTION ORAL DAILY
COMMUNITY

## 2022-08-21 RX ORDER — HYDROCODONE BITARTRATE AND ACETAMINOPHEN 5; 325 MG/1; MG/1
1 TABLET ORAL
Status: COMPLETED | OUTPATIENT
Start: 2022-08-21 | End: 2022-08-21

## 2022-08-21 RX ORDER — SODIUM CHLORIDE 9 MG/ML
INJECTION, SOLUTION INTRAVENOUS CONTINUOUS
Status: DISCONTINUED | OUTPATIENT
Start: 2022-08-21 | End: 2022-08-23

## 2022-08-21 RX ORDER — ACETAMINOPHEN 650 MG/1
650 SUPPOSITORY RECTAL EVERY 6 HOURS PRN
Status: DISCONTINUED | OUTPATIENT
Start: 2022-08-21 | End: 2022-08-29 | Stop reason: HOSPADM

## 2022-08-21 RX ORDER — AMLODIPINE BESYLATE 5 MG/1
5 TABLET ORAL DAILY
COMMUNITY

## 2022-08-21 RX ORDER — FLUOXETINE 10 MG/1
40 CAPSULE ORAL DAILY
COMMUNITY

## 2022-08-21 RX ORDER — SODIUM CHLORIDE 0.9 % (FLUSH) 0.9 %
5-40 SYRINGE (ML) INJECTION EVERY 12 HOURS SCHEDULED
Status: DISCONTINUED | OUTPATIENT
Start: 2022-08-21 | End: 2022-08-29 | Stop reason: HOSPADM

## 2022-08-21 RX ORDER — SODIUM CHLORIDE, SODIUM LACTATE, POTASSIUM CHLORIDE, CALCIUM CHLORIDE 600; 310; 30; 20 MG/100ML; MG/100ML; MG/100ML; MG/100ML
INJECTION, SOLUTION INTRAVENOUS CONTINUOUS
Status: DISCONTINUED | OUTPATIENT
Start: 2022-08-21 | End: 2022-08-21 | Stop reason: HOSPADM

## 2022-08-21 RX ORDER — DIPHENHYDRAMINE HYDROCHLORIDE 50 MG/ML
12.5 INJECTION INTRAMUSCULAR; INTRAVENOUS
Status: DISCONTINUED | OUTPATIENT
Start: 2022-08-21 | End: 2022-08-21 | Stop reason: HOSPADM

## 2022-08-21 RX ADMIN — Medication 10 MG: at 11:53

## 2022-08-21 RX ADMIN — PHENYLEPHRINE HYDROCHLORIDE 100 ML: 10 INJECTION INTRAVENOUS at 11:56

## 2022-08-21 RX ADMIN — DEXAMETHASONE SODIUM PHOSPHATE 4 MG: 4 INJECTION, SOLUTION INTRAMUSCULAR; INTRAVENOUS at 11:48

## 2022-08-21 RX ADMIN — FENTANYL CITRATE 50 MCG: 50 INJECTION INTRAMUSCULAR; INTRAVENOUS at 11:41

## 2022-08-21 RX ADMIN — Medication 5 MG: at 11:56

## 2022-08-21 RX ADMIN — Medication 5 MG: at 12:09

## 2022-08-21 RX ADMIN — CEFAZOLIN 2000 MG: 10 INJECTION, POWDER, FOR SOLUTION INTRAVENOUS at 21:16

## 2022-08-21 RX ADMIN — PROPOFOL 50 MG: 10 INJECTION, EMULSION INTRAVENOUS at 11:33

## 2022-08-21 RX ADMIN — SODIUM CHLORIDE, PRESERVATIVE FREE 10 ML: 5 INJECTION INTRAVENOUS at 14:00

## 2022-08-21 RX ADMIN — PHENYLEPHRINE HYDROCHLORIDE 50 ML: 10 INJECTION INTRAVENOUS at 12:09

## 2022-08-21 RX ADMIN — PHENYLEPHRINE HYDROCHLORIDE 100 ML: 10 INJECTION INTRAVENOUS at 11:53

## 2022-08-21 RX ADMIN — LIDOCAINE HYDROCHLORIDE 50 MG: 20 INJECTION, SOLUTION EPIDURAL; INFILTRATION; INTRACAUDAL; PERINEURAL at 11:33

## 2022-08-21 RX ADMIN — Medication 2000 MG: at 11:40

## 2022-08-21 RX ADMIN — SODIUM CHLORIDE, POTASSIUM CHLORIDE, SODIUM LACTATE AND CALCIUM CHLORIDE: 600; 310; 30; 20 INJECTION, SOLUTION INTRAVENOUS at 14:00

## 2022-08-21 RX ADMIN — FENTANYL CITRATE 50 MCG: 50 INJECTION INTRAMUSCULAR; INTRAVENOUS at 12:00

## 2022-08-21 RX ADMIN — SODIUM CHLORIDE, SODIUM LACTATE, POTASSIUM CHLORIDE, AND CALCIUM CHLORIDE: 600; 310; 30; 20 INJECTION, SOLUTION INTRAVENOUS at 11:30

## 2022-08-21 RX ADMIN — SODIUM CHLORIDE, PRESERVATIVE FREE 10 ML: 5 INJECTION INTRAVENOUS at 21:17

## 2022-08-21 RX ADMIN — ASPIRIN 325 MG: 325 TABLET, COATED ORAL at 21:17

## 2022-08-21 RX ADMIN — PROPOFOL 50 MG: 10 INJECTION, EMULSION INTRAVENOUS at 11:34

## 2022-08-21 RX ADMIN — SODIUM CHLORIDE, POTASSIUM CHLORIDE, SODIUM LACTATE AND CALCIUM CHLORIDE: 600; 310; 30; 20 INJECTION, SOLUTION INTRAVENOUS at 10:27

## 2022-08-21 RX ADMIN — HYDROCODONE BITARTRATE AND ACETAMINOPHEN 1 TABLET: 5; 325 TABLET ORAL at 02:16

## 2022-08-21 RX ADMIN — ONDANSETRON 4 MG: 2 INJECTION INTRAMUSCULAR; INTRAVENOUS at 11:48

## 2022-08-21 ASSESSMENT — ENCOUNTER SYMPTOMS
ABDOMINAL PAIN: 0
COUGH: 0
CHEST TIGHTNESS: 0
EYE PAIN: 0
SHORTNESS OF BREATH: 0
VOICE CHANGE: 0
VOMITING: 0
TROUBLE SWALLOWING: 0
BACK PAIN: 0
NAUSEA: 0
FACIAL SWELLING: 0
SORE THROAT: 0

## 2022-08-21 ASSESSMENT — PAIN - FUNCTIONAL ASSESSMENT
PAIN_FUNCTIONAL_ASSESSMENT: NONE - DENIES PAIN
PAIN_FUNCTIONAL_ASSESSMENT: 0-10
PAIN_FUNCTIONAL_ASSESSMENT: 0-10

## 2022-08-21 ASSESSMENT — PAIN DESCRIPTION - LOCATION: LOCATION: HIP

## 2022-08-21 ASSESSMENT — PAIN DESCRIPTION - ORIENTATION: ORIENTATION: RIGHT

## 2022-08-21 ASSESSMENT — PAIN SCALES - GENERAL
PAINLEVEL_OUTOF10: 9
PAINLEVEL_OUTOF10: 0

## 2022-08-21 NOTE — H&P
Hospitalist History and Physical   Admit Date:  2022 12:12 AM   Name:  Cheri Seymour   Age:  80 y.o. Sex:  female  :  1929   MRN:  [id number]   Room:  [facility]/    Presenting Complaint: Fall     Reason(s) for Admission: Hip fracture requiring operative repair, left, closed, initial encounter [facility]) Lum Malinda     History of Present Illness:   Cheri Seymour is a 80 y.o. female with medical history of   Hypertension  hypothyroidism     who presented with right hip pain after a fall. Patient lives at home with her daughter. She was trying to get out of bed this morning. She slipped out of bed and fell on the floor. She did not hit her head. She was awake and alert the whole time. She landed on her right hip. She felt the pain and could not get up. She was brought to emergency room. She was found to have fracture of her right hip. I am informed that orthopedic surgeon has been notified. She is being admitted for fracture of the right hip and being evaluated by orthopedist.  Likely she will go for surgery, may be later today. Review of Systems:  10 systems reviewed and negative except as noted in HPI. Assessment & Plan:     Principal Problem:    Hip fracture requiring operative repair, left, closed, initial encounter [facility])  Plan:   Admit to medical floor. IV fluid  N.p.o.  Symptomatic treatment  Pain control  Consult orthopedic surgeon for possible surgery, may be today. Active Problems:    Essential hypertension  Plan: Will monitor her blood pressure. Currently is under control. Acquired hypothyroidism  Plan:   I do not see the list of her home medications yet. Will review her home medication and may need to resume her home medications regarding this. Patient requires hospital stay as an in-patient and anticipated stay is more than 2 midnights due to the serious nature of the illness.      I have discussed the plan of care with patient and family member, Reagan Menjivar,  at bedside. Patient has no pain now. Will monitor. Further treatments will depend on initial responses and findings. Dispo/Discharge Planning:     to be determined     Diet: Diet NPO  VTE ppx: SCD  Code status: Full Code    Hospital Problems:  Principal Problem:    Hip fracture requiring operative repair, left, closed, initial encounter [facility])  Active Problems:    Essential hypertension    Acquired hypothyroidism  Resolved Problems:    * No resolved hospital problems. *       Past History:       Past Medical History:   Diagnosis Date    Anxiety     managed with medication     Hematuria, microscopic     Hypercholesteremia     managed with medication     Hypertension     managed with medication     Hypothyroidism     managed with medication     IBS (irritable bowel syndrome)     no recent symptoms    Osteoporosis     managed with supplement    Sciatica     chronic    Short-term memory loss     managed with medication     Sleep apnea     CPAP         Past Surgical History:   Procedure Laterality Date    ABLATION OF DYSRHYTHMIC FOCUS      HYSTERECTOMY      tubal     VITRECTOMY Bilateral         Social History     Tobacco Use    Smoking status: Never    Smokeless tobacco: Never   Substance Use Topics    Alcohol use: No      Social History     Substance and Sexual Activity   Drug Use No       Family History   Problem Relation Age of Onset    Stroke Father         Immunization History   Administered Date(s) Administered    PPD Test 05/20/2020     Not on File  Prior to Admit Medications:  No current outpatient medications      Objective:   Patient Vitals for the past 24 hrs:   Temp Pulse Resp BP SpO2   08/21/22 0016 99.5 °F (37.5 °C) 81 18 (!) 163/91 93 %       Oxygen Therapy  SpO2: 93 %    There is no height or weight on file to calculate BMI.   No intake or output data in the 24 hours ending 08/21/22 0747      Physical Exam:    Blood pressure (!) 163/91, pulse 81, temperature 99.5 °F (37.5 °C), temperature source Oral, resp. rate 18, SpO2 93 %. General:    Well nourished. Patient is lying in bed and appears comfortable. She is talking well. Hard of hearing. Daughter is at the bedside. Head:  Normocephalic, atraumatic  Eyes:  Sclerae appear normal.  Pupils equally round. ENT:  Nares appear normal, no drainage. Moist oral mucosa  Neck:  No restricted ROM. Trachea midline   CV:   RRR. No m/r/g. No jugular venous distension. Lungs:   CTAB. No wheezing, rhonchi, or rales. Symmetric expansion. Abdomen: Bowel sounds present. Soft, nontender, nondistended. Extremities: Shortening deformity of the right leg, with some external rotation. Skin:     No rashes and normal coloration. Warm and dry. Neuro:  CN II-XII grossly intact. Sensation intact. A&Ox3  Psych:  Normal mood and affect. I have personally reviewed labs and tests showing:  Recent Labs:  No results found for this or any previous visit (from the past 24 hour(s)). I have personally reviewed imaging studies showing:  CT HIP RIGHT WO CONTRAST    Result Date: 8/21/2022  EXAMINATION: CT HIP RIGHT WO CONTRAST HISTORY: Suspect impacted femoral neck fracture. TECHNIQUE: Noncontrast CT of the pelvis performed in the axial plane. Coronal and sagittal reformatted images were also created and reviewed. Dedicated views of the right hip were submitted. Dose reduction technique used: Automated exposure control/Adjustment of the mA and/or kV according to patient size/Use of iterative reconstruction technique. COMPARISON: X-rays dated 8/21/2022 FINDINGS: There is a small, undisplaced fracture in the anterior lateral head of the right femur. (See images #60 and #61 of series 303.) The age of this fracture is not clear. There is no appreciable associated fluid and there is no subcutaneous edema. The bones of the pelvis are otherwise unremarkable. Postoperative changes are seen in the left hip.      There appears to be fracture in the anterolateral head of the right femur. The age of this fracture is unclear as there is no associated fluid or soft tissue edema. Please correlate clinically. XR HIP 2-3 VW W PELVIS RIGHT    Result Date: 8/21/2022  EXAMINATION: Right Hip HISTORY: fall, right hip pain. TECHNIQUE: Single frontal view of the pelvis. AP and lateral views of the right hip. COMPARISON: Pelvis from a left hip series dated 6/25/2020 FINDINGS: No evidence of acute fracture or dislocation of the pelvic ring. Bilateral hip joints are intact. The right femoral neck is intact. Question small avulsion fracture of the posterior right femoral head. There appears to be mild right lateral soft tissue swelling. Findings as above. Echocardiogram:  No results found for this or any previous visit. Meds previously ordered:  Orders Placed This Encounter   Medications    HYDROcodone-acetaminophen (NORCO) 5-325 MG per tablet 1 tablet    sodium chloride flush 0.9 % injection 5-40 mL    sodium chloride flush 0.9 % injection 5-40 mL    0.9 % sodium chloride infusion    OR Linked Order Group     ondansetron (ZOFRAN-ODT) disintegrating tablet 4 mg     ondansetron (ZOFRAN) injection 4 mg    polyethylene glycol (GLYCOLAX) packet 17 g    OR Linked Order Group     acetaminophen (TYLENOL) tablet 650 mg     acetaminophen (TYLENOL) suppository 650 mg    0.9 % sodium chloride infusion         Signed:  Reyna De Leon MD    Part of this note may have been written by using a voice dictation software. The note has been proof read but may still contain some grammatical/other typographical errors.

## 2022-08-21 NOTE — ANESTHESIA PRE PROCEDURE
Department of Anesthesiology  Preprocedure Note       Name:  Steve Agarwal   Age:  80 y.o.  :  1929                                          MRN:  [id number]         Date:  2022      Surgeon: Darline Ribeiro):  Rodger Yi MD    Procedure: Procedure(s):  HIP OPEN REDUCTION INTERNAL FIXATION FNS RIGHT    Medications prior to admission:   Prior to Admission medications    Medication Sig Start Date End Date Taking?  Authorizing Provider   FLUoxetine (PROZAC) 10 MG capsule Take 40 mg by mouth daily   Yes Historical Provider, MD       Current medications:    Current Facility-Administered Medications   Medication Dose Route Frequency Provider Last Rate Last Admin    sodium chloride flush 0.9 % injection 5-40 mL  5-40 mL IntraVENous 2 times per day Johnson Norris MD        sodium chloride flush 0.9 % injection 5-40 mL  5-40 mL IntraVENous PRN Johnson Norris MD        0.9 % sodium chloride infusion   IntraVENous PRN Johnson Norris MD        ondansetron (ZOFRAN-ODT) disintegrating tablet 4 mg  4 mg Oral Q8H PRN Carson Menchaca MD        Or    ondansetron (ZOFRAN) injection 4 mg  4 mg IntraVENous Q6H PRN Johnson Norris MD        polyethylene glycol (GLYCOLAX) packet 17 g  17 g Oral Daily PRN Johnson Norris MD        acetaminophen (TYLENOL) tablet 650 mg  650 mg Oral Q6H PRN Carson Menchaca MD        Or    acetaminophen (TYLENOL) suppository 650 mg  650 mg Rectal Q6H PRN Johnson Norris MD        0.9 % sodium chloride infusion   IntraVENous Continuous Carson Menchaca MD        ceFAZolin (ANCEF) 2000 mg in sterile water 20 mL IV syringe  2,000 mg IntraVENous Once Rodger Yi MD        lactated ringers infusion   IntraVENous Continuous Hayden Alvarez MD           Allergies:  No Known Allergies    Problem List:    Patient Active Problem List   Diagnosis Code    Essential hypertension I10    Acquired hypothyroidism E03.9    Closed left hip fracture ([facility] S72.002A    CKD (chronic kidney disease) stage 3, GFR 30-59 ml/min ([facility]) N18.30    Dementia (HCC) F03.90    Constipation K59.00    Depression F32. A    Hip fracture requiring operative repair, left, closed, initial encounter ([facility] S72.002A       Past Medical History:        Diagnosis Date    Anxiety     managed with medication     Hematuria, microscopic     Hypercholesteremia     managed with medication     Hypertension     managed with medication     Hypothyroidism     managed with medication     IBS (irritable bowel syndrome)     no recent symptoms    Osteoporosis     managed with supplement    Sciatica     chronic    Short-term memory loss     managed with medication     Sleep apnea     CPAP       Past Surgical History:        Procedure Laterality Date    ABLATION OF DYSRHYTHMIC FOCUS      HYSTERECTOMY (CERVIX STATUS UNKNOWN)      tubal     VITRECTOMY Bilateral        Social History:    Social History     Tobacco Use    Smoking status: Never    Smokeless tobacco: Never   Substance Use Topics    Alcohol use:  No                                Counseling given: Not Answered      Vital Signs (Current):   Vitals:    08/21/22 0016 08/21/22 0830 08/21/22 1007   BP: (!) 163/91 114/69 (!) 171/79   Pulse: 81  65   Resp: 18  18   Temp: 99.5 °F (37.5 °C)  98.5 °F (36.9 °C)   TempSrc: Oral  Oral   SpO2: 93% 94% 94%                                              BP Readings from Last 3 Encounters:   08/21/22 (!) 171/79       NPO Status: Time of last liquid consumption: 1900                        Time of last solid consumption: 1900                        Date of last liquid consumption: 08/20/22                        Date of last solid food consumption: 08/20/22    BMI:   Wt Readings from Last 3 Encounters:   No data found for Wt     There is no height or weight on file to calculate BMI.    CBC:   Lab Results   Component Value Date/Time    WBC 9.7 08/21/2022 08:57 AM    RBC 4.71 08/21/2022 08:57 AM    HGB 13.1 08/21/2022 08:57 AM    HCT 41.0 08/21/2022 08:57 AM    MCV 87.0 08/21/2022 08:57 AM    RDW 14.5 08/21/2022 08:57 AM     08/21/2022 08:57 AM       CMP:   Lab Results   Component Value Date/Time     08/21/2022 08:57 AM    K 4.8 08/21/2022 08:57 AM     08/21/2022 08:57 AM    CO2 25 08/21/2022 08:57 AM    BUN 24 08/21/2022 08:57 AM    CREATININE 1.10 08/21/2022 08:57 AM    GFRAA 60 08/21/2022 08:57 AM    AGRATIO 0.8 05/19/2020 03:19 PM    LABGLOM 49 08/21/2022 08:57 AM    GLUCOSE 89 08/21/2022 08:57 AM    PROT 7.9 08/21/2022 08:57 AM    CALCIUM 9.4 08/21/2022 08:57 AM    BILITOT 0.7 08/21/2022 08:57 AM    ALKPHOS 69 08/21/2022 08:57 AM    ALKPHOS 81 05/19/2020 03:19 PM    AST 36 08/21/2022 08:57 AM    ALT 30 08/21/2022 08:57 AM       POC Tests: No results for input(s): POCGLU, POCNA, POCK, POCCL, POCBUN, POCHEMO, POCHCT in the last 72 hours.     Coags:   Lab Results   Component Value Date/Time    PROTIME 13.2 05/20/2020 05:13 PM    INR 1.0 05/20/2020 05:13 PM       HCG (If Applicable): No results found for: PREGTESTUR, PREGSERUM, HCG, HCGQUANT     ABGs: No results found for: PHART, PO2ART, ESJ1WJZ, USN8JNB, BEART, N1NJOGUJ     Type & Screen (If Applicable):  No results found for: LABABO, LABRH    Drug/Infectious Status (If Applicable):  No results found for: HIV, HEPCAB    COVID-19 Screening (If Applicable): No results found for: COVID19        Anesthesia Evaluation  Patient summary reviewed and Nursing notes reviewed no history of anesthetic complications:   Airway: Mallampati: II  TM distance: >3 FB     Mouth opening: > = 3 FB   Dental:    (+) upper dentures and lower dentures      Pulmonary:normal exam    (+) sleep apnea (Used to use CPAP but has not for several years now):                             Cardiovascular:  Exercise tolerance: poor (<4 METS),   (+) hypertension:,         Rhythm: regular  Rate: normal           Beta Blocker:  Dose within 24 Hrs         Neuro/Psych:   (+) depression/anxiety dementia            GI/Hepatic/Renal:   (+) renal disease (Stage 3): CRI,           Endo/Other:    (+) hypothyroidism::., .                 Abdominal:             Vascular: Other Findings:           Anesthesia Plan      general     ASA 3             Anesthetic plan and risks discussed with patient and child/children.                         Cory Lofton MD   8/21/2022

## 2022-08-21 NOTE — PERIOP NOTE
TRANSFER - IN REPORT:    [facility]  being received from [facility] for ordered procedure      Report consisted of patient's Situation, Background, Assessment and   Recommendations(SBAR). Information from the following report(s) Nurse Handoff Report, Intake/Output, MAR, Recent Results, Pre Procedure Checklist, Procedure Verification, and Quality Measures was reviewed with the receiving nurse. Opportunity for questions and clarification was provided. Assessment completed upon patient's arrival to unit and care assumed.

## 2022-08-21 NOTE — ANESTHESIA POSTPROCEDURE EVALUATION
Department of Anesthesiology  Postprocedure Note    Patient: Kim Melendez  MRN: [id number]  YOB: 1929  Date of evaluation: 8/21/2022      Procedure Summary     Date: 08/21/22 Room / Location: [facility] MAIN OR  / [facility] MAIN OR    Anesthesia Start: 1130 Anesthesia Stop: 0197    Procedure: HIP OPEN REDUCTION INTERNAL FIXATION FNS RIGHT (Right: Hip) Diagnosis:       Closed fracture of neck of right femur, initial encounter ([facility]      (Right femoral neck fracture)    Providers: Marimar Decker MD Responsible Provider: Misty Ayala MD    Anesthesia Type: general ASA Status: 3          Anesthesia Type: No value filed.     Yordy Phase I: Yordy Score: 9    Yordy Phase II:        Anesthesia Post Evaluation    Patient location during evaluation: PACU  Patient participation: complete - patient participated  Level of consciousness: awake and alert  Airway patency: patent  Nausea & Vomiting: no nausea and no vomiting  Complications: no  Cardiovascular status: hemodynamically stable  Respiratory status: acceptable, nonlabored ventilation and spontaneous ventilation  Hydration status: euvolemic  Comments: /67   Pulse 67   Temp 98.2 °F (36.8 °C)   Resp 17   SpO2 95%     Multimodal analgesia pain management approach

## 2022-08-21 NOTE — ED NOTES
Patient did not have IV, ekg and lab prior to arrival of RN shift. Patient placed in hospital gown, EKG done, gray and IV placed. Patient with last known meal was an ENSURE last night around 7. This information is from family at bedside. Family updated on plan of care.       Marsha Cisneros RN  08/21/22 6660

## 2022-08-21 NOTE — OP NOTE
general anesthesia. She was placed supine on the fracture table. The left foot placed in the well-leg parker. The right foot placed in the traction device. Reduction was confirmed on fluoroscopy. We then prepped and draped the right hip in sterile fashion. Using fluoroscopy I marked our point of incision. I made a 1-1/2 to 2 inch incision over the lateral proximal thigh longitudinally. I incised down to the fascia. I then split the fascia longitudinally. I elevated the musculature off of the lateral aspect of the femur. We then placed a guidepin across the fracture into the femoral head. This was confirmed on fluoroscopy. This was then measured appropriately. I reamed over the guidepin in standard fashion. We then placed the Synthes 90 mm bolt for the FNS over the guidepin. The guidepin was then removed. Using the insertion handle we placed a cortical screw across the 1 hole of the sideplate of the bone. We also used the insertion handle to place the derotational screw into the system. This was the same length as the bolt. This gave us good fixation. Therefore the insertion handle was removed. We had previously also checked the tightness of the screws to try to prevent cold welding of the screw to the bolt. I then irrigated the wound. Closed the fascia with 0 Vicryl's. The dermis closed with 2-0 Vicryl's. The skin was closed with staples. This was then covered with a sterile dressing. Patient was then awakened and extubated. She was taken to the recovery room in stable condition. There were no complications.     Electronically signed by Jannette Hyman MD on 8/21/2022 at 12:28 PM

## 2022-08-21 NOTE — ED PROVIDER NOTES
Wound care at shift change awaiting CT of hip. CT of hip read as femoral neck fracture. Hospitalist will admit.      Paul Day MD  08/21/22 2726

## 2022-08-21 NOTE — ED TRIAGE NOTES
Patient arrives via EMS from home after falling. States that she was attempting to go to bathroom. Patient complaining of right hip pain. Patient has history of dementia and ems reports at her base line. States that she did not take night htn medications.

## 2022-08-21 NOTE — ED PROVIDER NOTES
[employer] Emergency Department Provider Note                   PCP:                Sae Finley MD               Age: 80 y.o. Sex: female       ICD-10-CM    1. Closed fracture of neck of right femur, initial encounter ([facility]  S72.001A       2. Fall from bed, initial encounter  W06Andi Portillo           DISPOSITION Admitted 08/21/2022 07:46:47 AM        MDM  Number of Diagnoses or Management Options  Closed fracture of neck of right femur, initial encounter ([facility]  Fall from bed, initial encounter  Diagnosis management comments: 77-year-old female presents for right hip pain status post mechanical fall that happened earlier tonight. Vital signs here are reviewed. X-rays here were obtained. Patient was given pain medication. Orders Placed This Encounter   Procedures    COVID-19, Rapid    XR HIP 2-3 VW W PELVIS RIGHT    CT HIP RIGHT WO CONTRAST    XR HIP RIGHT (2-3 VIEWS)    NC XR TECHNOLOGIST SERVICE    XR HIP RIGHT (1 VIEW)    CBC    Comprehensive Metabolic Panel    Basic Metabolic Panel    HJQAP-13    ADULT DIET;  Regular    Vital signs per unit routine    Place intermittent pneumatic compression device    Vital signs per unit routine    Wound Care Instructions    Encourage deep breathing and coughing every 2 hours while awake    Place intermittent pneumatic compression device    Assistive Devices    Full Weight Bearing as tolerated    Full code    FOLLOW UP VISIT    OT eval and treat    PT eval and treat    Initiate Oxygen Therapy Protocol    Initiate PACU Oxygen Therapy Protocol    Initiate Oxygen Therapy Protocol    Incentive spirometry    PLEASE READ & DOCUMENT PPD TEST IN 24 HRS    PLEASE READ & DOCUMENT PPD TEST IN 50 HRS    PLEASE READ & DOCUMENT PPD TEST IN 72 HRS    EKG 12 Lead    TYPE AND SCREEN    ADMIT TO INPATIENT    ADMIT TO INPATIENT        Crystal Crum is a 80 y.o. female who presents to the Emergency Department with chief complaint of    Chief Complaint   Patient presents with Fall      20-year-old female presents emerged department via EMS with a chief complaint of lright hip pain. Patient states that she was trying to go the restroom earlier tonight when she had a fall she landed on the right side of her hip. Patient reports right hip pain ever since. She denies any leaving or aggravating factors. Symptoms have been constant in time. Review of Systems   Constitutional:  Negative for activity change, chills and fever. HENT:  Negative for dental problem, drooling, facial swelling, sore throat, trouble swallowing and voice change. Eyes:  Negative for pain. Respiratory:  Negative for cough, chest tightness and shortness of breath. Cardiovascular:  Negative for chest pain and palpitations. Gastrointestinal:  Negative for abdominal pain, nausea and vomiting. Endocrine: Negative for polydipsia. Genitourinary:  Negative for difficulty urinating, dysuria and hematuria. Musculoskeletal:  Negative for back pain and neck pain. Positive right hip pain   Skin:  Negative for rash and wound. Neurological:  Negative for dizziness, seizures, facial asymmetry, speech difficulty, numbness and headaches. Psychiatric/Behavioral:  Negative for agitation and behavioral problems.       Past Medical History:   Diagnosis Date    Anxiety     managed with medication     Hematuria, microscopic     Hypercholesteremia     managed with medication     Hypertension     managed with medication     Hypothyroidism     managed with medication     IBS (irritable bowel syndrome)     no recent symptoms    Osteoporosis     managed with supplement    Sciatica     chronic    Short-term memory loss     managed with medication     Sleep apnea     CPAP        Past Surgical History:   Procedure Laterality Date    ABLATION OF DYSRHYTHMIC FOCUS      HIP FRACTURE SURGERY Right 8/21/2022    HIP OPEN REDUCTION INTERNAL FIXATION FNS RIGHT performed by Milton Silver MD at [address] [place] [address] HYSTERECTOMY (CERVIX STATUS UNKNOWN)      tubal     VITRECTOMY Bilateral         Family History   Problem Relation Age of Onset    Stroke Father         Social History     Socioeconomic History    Marital status:      Spouse name: None    Number of children: None    Years of education: None    Highest education level: None   Tobacco Use    Smoking status: Never    Smokeless tobacco: Never   Substance and Sexual Activity    Alcohol use: No    Drug use: No         Patient has no known allergies. Current Discharge Medication List        CONTINUE these medications which have NOT CHANGED    Details   FLUoxetine (PROZAC) 10 MG capsule Take 40 mg by mouth daily      metoprolol succinate (TOPROL XL) 50 MG extended release tablet Take 50 mg by mouth daily      potassium chloride (KLOR-CON) 20 MEQ packet Take 20 mEq by mouth daily      traZODone (DESYREL) 100 MG tablet Take 100 mg by mouth nightly      amLODIPine (NORVASC) 5 MG tablet Take 5 mg by mouth daily              Vitals signs and nursing note reviewed. Patient Vitals for the past 4 hrs:   Temp Pulse Resp BP SpO2   08/23/22 2301 98.4 °F (36.9 °C) 72 18 132/65 92 %          Physical Exam  Vitals and nursing note reviewed. Constitutional:       General: She is not in acute distress. Appearance: Normal appearance. She is not ill-appearing. HENT:      Head: Normocephalic and atraumatic. Right Ear: Tympanic membrane normal.      Left Ear: Tympanic membrane normal.      Mouth/Throat:      Mouth: Mucous membranes are moist.      Pharynx: Oropharynx is clear. Eyes:      Extraocular Movements: Extraocular movements intact. Pupils: Pupils are equal, round, and reactive to light. Cardiovascular:      Rate and Rhythm: Normal rate and regular rhythm. Heart sounds: No murmur heard. Comments: Dorsalis Pedis Pulse 2+ bl  Pulmonary:      Effort: No respiratory distress. Breath sounds: No wheezing or rhonchi.    Abdominal: Palpations: Abdomen is soft. There is no mass. Tenderness: There is no abdominal tenderness. There is no guarding. Musculoskeletal:         General: Tenderness present. No swelling. Cervical back: Normal range of motion and neck supple. No rigidity or tenderness. Comments: Range of motion of the right lower extremity due to pain. Patient's right leg is shortened and externally rotated compared to the left leg. Skin:     General: Skin is warm and dry. Capillary Refill: Capillary refill takes less than 2 seconds. Neurological:      General: No focal deficit present. Mental Status: She is alert and oriented to person, place, and time. Mental status is at baseline. Psychiatric:         Mood and Affect: Mood normal.         Behavior: Behavior normal.        Procedures      Labs Reviewed   COMPREHENSIVE METABOLIC PANEL - Abnormal; Notable for the following components:       Result Value    Sodium 134 (*)     Anion Gap 2 (*)     BUN 24 (*)     Creatinine 1.10 (*)     GFR  60 (*)     GFR Non- 49 (*)     Globulin 4.6 (*)     Albumin/Globulin Ratio 0.7 (*)     All other components within normal limits   BASIC METABOLIC PANEL - Abnormal; Notable for the following components:    Glucose 135 (*)     Creatinine 1.10 (*)     GFR  60 (*)     GFR Non- 49 (*)     All other components within normal limits   COVID-19, RAPID   CBC   COVID-19   PLEASE READ & DOCUMENT PPD TEST IN 24 HRS   TYPE AND SCREEN        XR HIP RIGHT (1 VIEW)   Final Result   Typical postsurgical changes. XR HIP RIGHT (2-3 VIEWS)   Final Result   Postsurgical change as described. NC XR TECHNOLOGIST SERVICE   Final Result      CT HIP RIGHT WO CONTRAST   Final Result   There appears to be fracture in the anterolateral head of the right femur. The   age of this fracture is unclear as there is no associated fluid or soft tissue   edema.  Please correlate clinically. XR HIP 2-3 VW W PELVIS RIGHT   Final Result   Findings as above. Voice dictation software was used during the making of this note. This software is not perfect and grammatical and other typographical errors may be present. This note has not been completely proofread for errors.      Yoseph Becerra DO  08/24/22 0151

## 2022-08-21 NOTE — ANESTHESIA POSTPROCEDURE EVALUATION
Department of Anesthesiology  Postprocedure Note    Patient: Cheri Seymour  MRN: [id number]  YOB: 1929  Date of evaluation: 8/21/2022      Procedure Summary     Date: 08/21/22 Room / Location: [facility] MAIN OR 09 / [facility] MAIN OR    Anesthesia Start: 1130 Anesthesia Stop: 0543    Procedure: HIP OPEN REDUCTION INTERNAL FIXATION FNS RIGHT (Right: Hip) Diagnosis:       Closed fracture of neck of right femur, initial encounter ([facility]      (Right femoral neck fracture)    Providers: Sonal Wade MD Responsible Provider: Celena Garcia MD    Anesthesia Type: general ASA Status: 3          Anesthesia Type: No value filed.     Yordy Phase I: Yordy Score: 9    Yordy Phase II:        Anesthesia Post Evaluation    Patient location during evaluation: PACU  Patient participation: complete - patient participated  Level of consciousness: awake and alert  Airway patency: patent  Nausea & Vomiting: no nausea and no vomiting  Complications: no  Cardiovascular status: hemodynamically stable  Respiratory status: acceptable, nonlabored ventilation and spontaneous ventilation  Hydration status: euvolemic  Comments: /67   Pulse 67   Temp 98.2 °F (36.8 °C)   Resp 17   SpO2 95%     Multimodal analgesia pain management approach

## 2022-08-21 NOTE — ACP (ADVANCE CARE PLANNING)
[facility] Hospitalist Service  At the heart of better care     Advance Care Planning   Admit Date:  2022 12:12 AM   Name:  Elkin Johnson   Age:  80 y.o. Sex:  female  :  1929   MRN:  [id number]   Room:  [place] [address]    Elkin Johnson is able to make her own decisions:   Yes     If pt unable to make decisions, POA/surrogate decision maker:  Daughter, Blas Printers     Other people present:   None     Patient / surrogate decision-maker directed code status:  Full code     Other ACP topics discussed, if applicable:   Treatment of fracture     Patient or surrogate consented to discussion of the current conditions, workup, management plans, prognosis, and the risk for further deterioration. Time spent: 18 minutes in direct discussion.       Signed:  Caridad Linares MD

## 2022-08-21 NOTE — PROGRESS NOTES
Pt daughter had  medicine vials in patients room and  said she gave her her potassium. Instructed daughter to take pt meds back home DO NOT GIVE HER ANYTHING .   Daughter agreed to instruction

## 2022-08-21 NOTE — PROGRESS NOTES
TRANSFER - IN REPORT:    Verbal report received from Alejandro Ballesteros RN(name) on Everardo Padilla  being received from [employer]) for routine post-op      Report consisted of patients Situation, Background, Assessment and   Recommendations(SBAR). Information from the following report(s) Nurse Handoff Report was reviewed with the receiving nurse. Opportunity for questions and clarification was provided.       Assessment completed upon patients arrival to unit and care assume

## 2022-08-22 PROCEDURE — 2500000003 HC RX 250 WO HCPCS: Performed by: ORTHOPAEDIC SURGERY

## 2022-08-22 PROCEDURE — 6370000000 HC RX 637 (ALT 250 FOR IP): Performed by: ORTHOPAEDIC SURGERY

## 2022-08-22 PROCEDURE — 2580000003 HC RX 258: Performed by: ORTHOPAEDIC SURGERY

## 2022-08-22 PROCEDURE — 6360000002 HC RX W HCPCS: Performed by: ORTHOPAEDIC SURGERY

## 2022-08-22 PROCEDURE — 2580000003 HC RX 258: Performed by: INTERNAL MEDICINE

## 2022-08-22 PROCEDURE — 6370000000 HC RX 637 (ALT 250 FOR IP): Performed by: INTERNAL MEDICINE

## 2022-08-22 PROCEDURE — 2700000000 HC OXYGEN THERAPY PER DAY

## 2022-08-22 PROCEDURE — 6370000000 HC RX 637 (ALT 250 FOR IP): Performed by: HOSPITALIST

## 2022-08-22 PROCEDURE — 94760 N-INVAS EAR/PLS OXIMETRY 1: CPT

## 2022-08-22 PROCEDURE — 1100000000 HC RM PRIVATE

## 2022-08-22 RX ORDER — FLUOXETINE HYDROCHLORIDE 20 MG/1
40 CAPSULE ORAL DAILY
Status: DISCONTINUED | OUTPATIENT
Start: 2022-08-22 | End: 2022-08-29 | Stop reason: HOSPADM

## 2022-08-22 RX ADMIN — TRAZODONE HYDROCHLORIDE 100 MG: 50 TABLET ORAL at 20:58

## 2022-08-22 RX ADMIN — FLUOXETINE HYDROCHLORIDE 40 MG: 20 CAPSULE ORAL at 12:14

## 2022-08-22 RX ADMIN — ACETAMINOPHEN 650 MG: 325 TABLET ORAL at 09:30

## 2022-08-22 RX ADMIN — SODIUM CHLORIDE, PRESERVATIVE FREE 10 ML: 5 INJECTION INTRAVENOUS at 22:43

## 2022-08-22 RX ADMIN — CEFAZOLIN 2000 MG: 10 INJECTION, POWDER, FOR SOLUTION INTRAVENOUS at 05:36

## 2022-08-22 RX ADMIN — SODIUM CHLORIDE, PRESERVATIVE FREE 10 ML: 5 INJECTION INTRAVENOUS at 22:44

## 2022-08-22 RX ADMIN — SODIUM CHLORIDE, PRESERVATIVE FREE 10 ML: 5 INJECTION INTRAVENOUS at 09:19

## 2022-08-22 RX ADMIN — ASPIRIN 325 MG: 325 TABLET, COATED ORAL at 09:19

## 2022-08-22 RX ADMIN — ASPIRIN 325 MG: 325 TABLET, COATED ORAL at 20:58

## 2022-08-22 RX ADMIN — SODIUM CHLORIDE: 9 INJECTION, SOLUTION INTRAVENOUS at 09:21

## 2022-08-22 ASSESSMENT — PAIN DESCRIPTION - DESCRIPTORS: DESCRIPTORS: ACHING

## 2022-08-22 ASSESSMENT — PAIN SCALES - GENERAL
PAINLEVEL_OUTOF10: 0
PAINLEVEL_OUTOF10: 3

## 2022-08-22 ASSESSMENT — PAIN DESCRIPTION - ORIENTATION: ORIENTATION: RIGHT

## 2022-08-22 ASSESSMENT — PAIN - FUNCTIONAL ASSESSMENT: PAIN_FUNCTIONAL_ASSESSMENT: PREVENTS OR INTERFERES SOME ACTIVE ACTIVITIES AND ADLS

## 2022-08-22 ASSESSMENT — PAIN DESCRIPTION - LOCATION: LOCATION: LEG

## 2022-08-22 NOTE — PROGRESS NOTES
Physical Therapy Note:     Chart reviewed and screened secondary to discussion during IDT Rounds. Patient is 1 day s/p right hip ORIF. Currently there are no PT/OT orders for this patient and a bedrest order is active. Please place orders if indicated per MD. Message sent to hospitalist/primary. Discussed with CM as well. Thank you,    Javier Coker, PT, DPT

## 2022-08-22 NOTE — DISCHARGE INSTRUCTIONS
[address] [place] [address]      IF YOU HAVE ANY PROBLEMS ONCE YOU ARE AT HOME CALL THE FOLLOWING NUMBERS:   Main office number: [phone] ask for Juanjo Benjamin (medical assistant with Dr. Lisa Livingston)  Office Address: [facility] [address] [address],[address] [address] [facility], [address] [facility]      Patient Discharge Instructions    Nate Vallecillo / [id number] : 1929    Admitted 2022           To be given to [address] on Admission         Weight bearing status: As tolerated with walker and assistance    Activity  Continue Physical Therapy and Occupational Therapy   Fall precautions     Wound Care  Dry dressing changes using sterile technique every other day or more frequently if needed   Staples are to be left in and removed in our office 2 weeks postop    Diet  Resume regular or diabetic diet      Medications    Patient medications are listed on the medication reconciliation sheet. Follow up   Follow up in our office in 2 weeks postop   All patients are to be transported via stretcher unless they are able to independently get out of a chair and stand without assistance. Information obtained by :  I understand that if any problems occur once I am at home I am to contact my physician. I understand and acknowledge receipt of the instructions indicated above.                                                                                                                                            Physician's or R.N.'s Signature                                                                  Date/Time                                                                                                                                              Patient or Representative Signature                                                          Date/Time

## 2022-08-22 NOTE — PROGRESS NOTES
Hospitalist Progress Note   Admit Date:  2022 12:12 AM   Name:  Pat Stovall   Age:  80 y.o. Sex:  female  :  1929   MRN:  [id number]   Room:  [facility]/    Presenting Complaint: Fall     Reason(s) for Admission: Fall from bed, initial encounter [W06. XXXA]  Closed fracture of neck of right femur, initial encounter ([facility] [address] [S72.001A]  Hip fracture requiring operative repair, left, closed, initial encounter [facility] Course & Interval History:     Pat Stovall is a 80 y.o. female with medical history of   Hypertension  hypothyroidism      who presented with right hip pain after a fall. Patient lives at home with her daughter. She was trying to get out of bed this morning. She slipped out of bed and fell on the floor. She did not hit her head. She was awake and alert the whole time. She landed on her right hip. She felt the pain and could not get up. She was brought to emergency room. She was found to have fracture of her right hip. Patient underwent right hip open reduction and internal fixation on 2022. Subjective/24hr Events (22):     22   Patient is lying in bed. No fever. No shaking. No chills. No chest pain. No shortness of breath. Assessment & Plan:     Principal Problem:    Hip fracture requiring operative repair, left, closed, initial encounter ([facility] [address] [address]  Plan:   S/P open reduction and internal fixation   Pain control  Physical therapy as per orthopedic service. Active Problems:    Essential hypertension  Plan: Will monitor her blood pressure. BP is 154/72 mmHg. The patient is 80years old. Will avoid aggressive lowering of BP. Acquired hypothyroidism  Plan:   She does not have thyroid hormone supplement listed on her home medication list.   Will clarify with family. I have discussed the plan of care with patient and family member, daughter, at bedside.          Discharge Planning:      to be determined    will help. Diet:  ADULT DIET; Regular  DVT PPx: aspirin   Code status: Full Code    Hospital Problems:  Principal Problem:    Hip fracture requiring operative repair, left, closed, initial encounter [facility])  Active Problems:    Essential hypertension    Acquired hypothyroidism  Resolved Problems:    * No resolved hospital problems. *      Objective:   Patient Vitals for the past 24 hrs:   Temp Pulse Resp BP SpO2   08/22/22 0757 97.5 °F (36.4 °C) 68 18 (!) 154/72 95 %   08/22/22 0357 98.1 °F (36.7 °C) 65 18 (!) 111/48 93 %   08/21/22 2308 98.2 °F (36.8 °C) 71 18 (!) 155/72 94 %   08/21/22 2026 97.7 °F (36.5 °C) 67 18 120/62 94 %   08/21/22 1629 98.2 °F (36.8 °C) 67 17 133/67 95 %   08/21/22 1321 98.6 °F (37 °C) 74 20 (!) 188/75 95 %   08/21/22 1257 97.3 °F (36.3 °C) 71 16 (!) 161/69 96 %   08/21/22 1254 -- 74 16 (!) 153/73 96 %   08/21/22 1247 -- 76 16 (!) 167/77 97 %   08/21/22 1242 -- 74 16 (!) 149/70 98 %   08/21/22 1239 -- 74 16 (!) 149/69 95 %   08/21/22 1236 -- 77 16 (!) 162/74 95 %   08/21/22 1234 97.2 °F (36.2 °C) 79 16 (!) 162/74 96 %   08/21/22 1007 98.5 °F (36.9 °C) 65 18 (!) 171/79 94 %       Oxygen Therapy  SpO2: 95 %  Pulse via Oximetry: 63 beats per minute  Pulse Oximeter Device Mode: Continuous  Pulse Oximeter Device Location: Right  O2 Device: Nasal cannula  O2 Flow Rate (L/min): 3 L/min    There is no height or weight on file to calculate BMI. Intake/Output Summary (Last 24 hours) at 8/22/2022 0913  Last data filed at 8/22/2022 0536  Gross per 24 hour   Intake 1050 ml   Output 1000 ml   Net 50 ml         Physical Exam:     Blood pressure (!) 154/72, pulse 68, temperature 97.5 °F (36.4 °C), temperature source Axillary, resp. rate 18, SpO2 95 %. General:          Well nourished. Patient is lying in bed and appears comfortable. She is talking well. Hard of hearing. Daughter is at the bedside.    Head:               Normocephalic, atraumatic  Eyes: Sclerae appear normal.  Pupils equally round. ENT:                Nares appear normal, no drainage. Moist oral mucosa  Neck:               No restricted ROM. Trachea midline   CV:                  RRR. No m/r/g. No jugular venous distension. Lungs:             CTAB. No wheezing, rhonchi, or rales. Symmetric expansion. Abdomen: Bowel sounds present. Soft, nontender, nondistended. Extremities:     right upper thigh on the lateral aspect with dressing and elastic bandage. No bleeding. No discharge. Skin:                No rashes and normal coloration. Warm and dry. Neuro:             CN II-XII grossly intact. Sensation intact. A&Ox3  Psych:             Normal mood and affect.          I have personally reviewed labs and tests showing:  Recent Labs:  Recent Results (from the past 48 hour(s))   TYPE AND SCREEN    Collection Time: 08/21/22  8:55 AM   Result Value Ref Range    Crossmatch expiration date 08/24/2022,2359     ABO/Rh O POSITIVE     Antibody Screen NEG    CBC    Collection Time: 08/21/22  8:57 AM   Result Value Ref Range    WBC 9.7 4.3 - 11.1 K/uL    RBC 4.71 4.05 - 5.2 M/uL    Hemoglobin 13.1 11.7 - 15.4 g/dL    Hematocrit 41.0 35.8 - 46.3 %    MCV 87.0 79.6 - 97.8 FL    MCH 27.8 26.1 - 32.9 PG    MCHC 32.0 31.4 - 35.0 g/dL    RDW 14.5 11.9 - 14.6 %    Platelets 902 836 - 518 K/uL    MPV 11.6 9.4 - 12.3 FL    nRBC 0.00 0.0 - 0.2 K/uL   Comprehensive Metabolic Panel    Collection Time: 08/21/22  8:57 AM   Result Value Ref Range    Sodium 134 (L) 136 - 145 mmol/L    Potassium 4.8 3.5 - 5.1 mmol/L    Chloride 107 98 - 107 mmol/L    CO2 25 21 - 32 mmol/L    Anion Gap 2 (L) 7 - 16 mmol/L    Glucose 89 65 - 100 mg/dL    BUN 24 (H) 8 - 23 MG/DL    Creatinine 1.10 (H) 0.6 - 1.0 MG/DL    GFR African American 60 (L) >60 ml/min/1.73m2    GFR Non- 49 (L) >60 ml/min/1.73m2    Calcium 9.4 8.3 - 10.4 MG/DL    Total Bilirubin 0.7 0.2 - 1.1 MG/DL    ALT 30 12 - 65 U/L    AST 36 15 - 37 U/L    Alk Phosphatase 69 50 - 136 U/L    Total Protein 7.9 6.3 - 8.2 g/dL    Albumin 3.3 3.2 - 4.6 g/dL    Globulin 4.6 (H) 2.3 - 3.5 g/dL    Albumin/Globulin Ratio 0.7 (L) 1.2 - 3.5     Basic Metabolic Panel    Collection Time: 08/21/22  3:08 PM   Result Value Ref Range    Sodium 138 136 - 145 mmol/L    Potassium 3.9 3.5 - 5.1 mmol/L    Chloride 106 98 - 107 mmol/L    CO2 24 21 - 32 mmol/L    Anion Gap 8 7 - 16 mmol/L    Glucose 135 (H) 65 - 100 mg/dL    BUN 22 8 - 23 MG/DL    Creatinine 1.10 (H) 0.6 - 1.0 MG/DL    GFR African American 60 (L) >60 ml/min/1.73m2    GFR Non- 49 (L) >60 ml/min/1.73m2    Calcium 8.8 8.3 - 10.4 MG/DL       I have personally reviewed imaging studies showing: Other Studies:  XR HIP RIGHT (1 VIEW)   Final Result   Typical postsurgical changes. XR HIP RIGHT (2-3 VIEWS)   Final Result   Postsurgical change as described. NC XR TECHNOLOGIST SERVICE   Final Result      CT HIP RIGHT WO CONTRAST   Final Result   There appears to be fracture in the anterolateral head of the right femur. The   age of this fracture is unclear as there is no associated fluid or soft tissue   edema. Please correlate clinically. XR HIP 2-3 VW W PELVIS RIGHT   Final Result   Findings as above.              Current Meds:  Current Facility-Administered Medications   Medication Dose Route Frequency    sodium chloride flush 0.9 % injection 5-40 mL  5-40 mL IntraVENous 2 times per day    sodium chloride flush 0.9 % injection 5-40 mL  5-40 mL IntraVENous PRN    0.9 % sodium chloride infusion   IntraVENous PRN    ondansetron (ZOFRAN-ODT) disintegrating tablet 4 mg  4 mg Oral Q8H PRN    Or    ondansetron (ZOFRAN) injection 4 mg  4 mg IntraVENous Q6H PRN    polyethylene glycol (GLYCOLAX) packet 17 g  17 g Oral Daily PRN    acetaminophen (TYLENOL) tablet 650 mg  650 mg Oral Q6H PRN    Or    acetaminophen (TYLENOL) suppository 650 mg  650 mg Rectal Q6H PRN    0.9 % sodium chloride infusion IntraVENous Continuous    lactated ringers infusion   IntraVENous Continuous    sodium chloride flush 0.9 % injection 5-40 mL  5-40 mL IntraVENous 2 times per day    sodium chloride flush 0.9 % injection 5-40 mL  5-40 mL IntraVENous PRN    0.9 % sodium chloride infusion   IntraVENous PRN    aspirin EC tablet 325 mg  325 mg Oral BID    traZODone (DESYREL) tablet 100 mg  100 mg Oral Nightly       Signed:  Jeremiah Salcedo MD    Part of this note may have been written by using a voice dictation software. The note has been proof read but may still contain some grammatical/other typographical errors.

## 2022-08-22 NOTE — PROGRESS NOTES
[facility]        2022         Post Op day: 1 Day Post-Op Procedure(s) (LRB):  HIP OPEN REDUCTION INTERNAL FIXATION FNS RIGHT (Right)      Admit Date: 2022  Admit Diagnosis: Fall from bed, initial encounter [W06. XXXA]  Closed fracture of neck of right femur, initial encounter ([facility] [S72.001A]  Hip fracture requiring operative repair, left, closed, [facility] [S72.002A]       Principle Problem: Hip fracture requiring operative repair, left, closed, initial encounter ([facility] Subjective: Doing well, No complaints, No SOB, No Chest Pain, No Nausea or Vomiting     Objective:   Vital Signs are Stable, No Acute Distress, Alert,  Dressing is Dry,  Neurovascular exam is normal.     Assessment / Plan :  Patient Active Problem List   Diagnosis    Essential hypertension    Acquired hypothyroidism    Closed left hip fracture (HCC)    CKD (chronic kidney disease) stage 3, GFR 30-59 ml/min (HCC)    Dementia (HCC)    Constipation    Depression    Hip fracture requiring operative repair, left, closed, initial encounter [facility])    Patient Vitals for the past 8 hrs:   BP Temp Temp src Pulse Resp SpO2   22 0757 (!) 154/72 97.5 °F (36.4 °C) Axillary 68 18 95 %   22 0357 (!) 111/48 98.1 °F (36.7 °C) Oral 65 18 93 %    Temp (24hrs), Av.9 °F (36.6 °C), Min:97.2 °F (36.2 °C), Max:98.6 °F (37 °C)    There is no height or weight on file to calculate BMI.     Lab Results   Component Value Date/Time    HGB 13.1 2022 08:57 AM          S/P Procedure(s) (LRB):  HIP OPEN REDUCTION INTERNAL FIXATION FNS RIGHT (Right) by Dr. Miguel Angel Beltran       Medical Mgmt per hospitalist  Anticoagulation plan: ASA 325mg daily  Continue PT  Fall Precautions  DC disp: rehab placement   F/U: 2 weeks postop for wound check and staple removal        Signed By: CHERYL Seo, PA  2022,  10:01 AM

## 2022-08-22 NOTE — PROGRESS NOTES
CM following for d/c planning. Patient admitted s/p fall and Hip fracture requiring operative repair, left, closed, initial encounter [facility])  Plan:   S/P open reduction and internal fixation     Procedure completed 8.21.2022.     No pt/ot orders received yet per ortho   Discussed in IDT rounds     CM will follow for d/c recommendations after pt/ot evaluations are complete

## 2022-08-22 NOTE — CARE COORDINATION
Patient admitted s/p fall and Hip fracture requiring operative repair, left, closed, initial encounter [facility])  Plan:   S/P open reduction and internal fixation      Procedure completed 8.21.2022. Patient lives with her daughter who assists her as needed. Patient has a walker at home and was able to ambulate prior to fall/admission. D/c planning ongoing - awaiting orders for pt/ot evaluations and recommendations. Explained to patient and daughter at bedside that we would be in contact after recommendations have been given to determine best d/c plan for patient. Patient has been to STR in the past at [facility] - per daughter    CM following        08/22/22 1359   Service Assessment   Patient Orientation Alert and Oriented   Cognition Alert   History Provided By Child/Family   Primary [address]   Can patient return to prior living arrangement Unknown at present   Ability to make needs known: Good   Family able to assist with home care needs: Yes   Social/Functional History   Lives With Family;Daughter   Home Equipment Walker, rolling   Discharge Planning   Living Arrangements Children   Patient expects to be discharged to: Unknown   Condition of Participation: Discharge Planning   The Patient and/or Patient Representative was provided with a Choice of Provider? Patient   The Patient and/Or Patient Representative agree with the Discharge Plan? Yes   Freedom of Choice list was provided with basic dialogue that supports the patient's individualized plan of care/goals, treatment preferences, and shares the quality data associated with the providers?   Yes

## 2022-08-22 NOTE — PLAN OF CARE
Problem: Pain  Goal: Verbalizes/displays adequate comfort level or baseline comfort level  8/22/2022 1943 by Carrington Crowell RN  Outcome: Progressing  8/22/2022 1704 by Alexander Leonardo RN  Outcome: Progressing     Problem: Discharge Planning  Goal: Discharge to home or other facility with appropriate resources  8/22/2022 1943 by Carrington Crowell RN  Outcome: Progressing  8/22/2022 1704 by Alexander Leonardo RN  Outcome: Progressing     Problem: Safety - Adult  Goal: Free from fall injury  8/22/2022 1943 by Carrington Crowell RN  Outcome: Progressing  8/22/2022 1704 by Alexander Leonardo RN  Outcome: Progressing     Problem: ABCDS Injury Assessment  Goal: Absence of physical injury  8/22/2022 1943 by Carrington Crowell RN  Outcome: Progressing  8/22/2022 1704 by Alexander Leonardo RN  Outcome: Progressing     Problem: Skin/Tissue Integrity  Goal: Absence of new skin breakdown  Description: 1. Monitor for areas of redness and/or skin breakdown  2. Assess vascular access sites hourly  3. Every 4-6 hours minimum:  Change oxygen saturation probe site  4. Every 4-6 hours:  If on nasal continuous positive airway pressure, respiratory therapy assess nares and determine need for appliance change or resting period.   8/22/2022 1943 by Carrington Crowell RN  Outcome: Progressing  8/22/2022 1704 by Alexander Leonardo RN  Outcome: Progressing

## 2022-08-23 LAB
SARS-COV-2 RDRP RESP QL NAA+PROBE: NOT DETECTED
SARS-COV-2: NORMAL
SOURCE: NORMAL

## 2022-08-23 PROCEDURE — 6370000000 HC RX 637 (ALT 250 FOR IP): Performed by: INTERNAL MEDICINE

## 2022-08-23 PROCEDURE — 97166 OT EVAL MOD COMPLEX 45 MIN: CPT

## 2022-08-23 PROCEDURE — 6370000000 HC RX 637 (ALT 250 FOR IP): Performed by: HOSPITALIST

## 2022-08-23 PROCEDURE — 97530 THERAPEUTIC ACTIVITIES: CPT

## 2022-08-23 PROCEDURE — 1100000000 HC RM PRIVATE

## 2022-08-23 PROCEDURE — 97112 NEUROMUSCULAR REEDUCATION: CPT

## 2022-08-23 PROCEDURE — 2500000003 HC RX 250 WO HCPCS: Performed by: INTERNAL MEDICINE

## 2022-08-23 PROCEDURE — 97162 PT EVAL MOD COMPLEX 30 MIN: CPT

## 2022-08-23 PROCEDURE — 2580000003 HC RX 258: Performed by: ORTHOPAEDIC SURGERY

## 2022-08-23 PROCEDURE — 87635 SARS-COV-2 COVID-19 AMP PRB: CPT

## 2022-08-23 PROCEDURE — 6370000000 HC RX 637 (ALT 250 FOR IP): Performed by: ORTHOPAEDIC SURGERY

## 2022-08-23 PROCEDURE — 2580000003 HC RX 258: Performed by: INTERNAL MEDICINE

## 2022-08-23 RX ADMIN — SODIUM CHLORIDE, PRESERVATIVE FREE 10 ML: 5 INJECTION INTRAVENOUS at 23:00

## 2022-08-23 RX ADMIN — SODIUM CHLORIDE, PRESERVATIVE FREE 10 ML: 5 INJECTION INTRAVENOUS at 08:18

## 2022-08-23 RX ADMIN — ASPIRIN 325 MG: 325 TABLET, COATED ORAL at 08:09

## 2022-08-23 RX ADMIN — TRAZODONE HYDROCHLORIDE 100 MG: 50 TABLET ORAL at 20:51

## 2022-08-23 RX ADMIN — FLUOXETINE HYDROCHLORIDE 40 MG: 20 CAPSULE ORAL at 08:09

## 2022-08-23 RX ADMIN — ASPIRIN 325 MG: 325 TABLET, COATED ORAL at 20:51

## 2022-08-23 RX ADMIN — SODIUM CHLORIDE, PRESERVATIVE FREE 20 ML: 5 INJECTION INTRAVENOUS at 08:18

## 2022-08-23 RX ADMIN — TUBERCULIN PURIFIED PROTEIN DERIVATIVE 5 UNITS: 5 INJECTION, SOLUTION INTRADERMAL at 18:28

## 2022-08-23 ASSESSMENT — PAIN SCALES - GENERAL
PAINLEVEL_OUTOF10: 0
PAINLEVEL_OUTOF10: 0

## 2022-08-23 NOTE — PROGRESS NOTES
[facility]        2022         Post Op day: 2 Days Post-Op Procedure(s) (LRB):  HIP OPEN REDUCTION INTERNAL FIXATION FNS RIGHT (Right)      Admit Date: 2022  Admit Diagnosis: Fall from bed, initial encounter [W06. XXXA]  Closed fracture of neck of right femur, initial encounter ([facility] [S72.001A]  Hip fracture requiring operative repair, left, closed, [facility] [S72.002A]       Principle Problem: Hip fracture requiring operative repair, left, closed, initial encounter ([facility] Subjective: Doing well, No complaints, No SOB, No Chest Pain, No Nausea or Vomiting     Objective:   Vital Signs are Stable, No Acute Distress, Alert,  Dressing is Dry,  Neurovascular exam is normal.     Assessment / Plan :  Patient Active Problem List   Diagnosis    Essential hypertension    Acquired hypothyroidism    Closed left hip fracture (HCC)    CKD (chronic kidney disease) stage 3, GFR 30-59 ml/min ([facility])    Dementia (HCC)    Constipation    Depression    Hip fracture requiring operative repair, left, closed, initial encounter [facility])    Patient Vitals for the past 8 hrs:   BP Temp Temp src Pulse Resp SpO2   22 0724 (!) 152/74 99.3 °F (37.4 °C) Oral 70 17 91 %   22 0319 134/63 98.8 °F (37.1 °C) Oral 68 16 (!) 88 %    Temp (24hrs), Av.5 °F (36.9 °C), Min:97.9 °F (36.6 °C), Max:99.3 °F (37.4 °C)    There is no height or weight on file to calculate BMI.     Lab Results   Component Value Date/Time    HGB 13.1 2022 08:57 AM        S/P Procedure(s) (LRB):  HIP OPEN REDUCTION INTERNAL FIXATION FNS RIGHT (Right) by Dr. Amina Parsons         Medical Mgmt per hospitalist  Anticoagulation plan: ASA 325mg daily  Continue PT  Fall Precautions  DC disp: rehab placement   F/U: 2 weeks postop for wound check and staple removal        Signed By: CHERYL Rachel, PA  2022,  9:48 AM

## 2022-08-23 NOTE — PROGRESS NOTES
PHYSICAL THERAPY Initial Assessment, Daily Note, and AM  (Link to Caseload Tracking: PT Visit Days : 1  Acknowledge Orders  Time In/Out  PT Charge Capture  Rehab Caseload Tracker    Comfort Gomes is a 80 y.o. female   PRIMARY DIAGNOSIS: Hip fracture requiring operative repair, left, closed, initial encounter ([facility] [address]  Fall from bed, initial encounter [W06. XXXA]  Closed fracture of neck of right femur, initial encounter ([facility] [S72.001A]  Hip fracture requiring operative repair, left, closed, initial encounter ([facility] [S72.002A]  Procedure(s) (LRB):  HIP OPEN REDUCTION INTERNAL FIXATION FNS RIGHT (Right)  2 Days Post-Op  Reason for Referral: Pain in Right Hip (M25.551)  Difficulty in walking, Not elsewhere classified (R26.2)  History of falling (Z91.81)  Inpatient: Payor: MEDICARE / Plan: MEDICARE PART A AND B / Product Type: *No Product type* /     ASSESSMENT:     REHAB RECOMMENDATIONS:   Recommendation to date pending progress:  Setting:  Short-term Rehab    Equipment:    Rolling Walker     ASSESSMENT:  Ms. Anselmo Bailey  is a 80year old F who presents s/p above procedure POD 2 after a fall. At baseline, pt lives with daughter who assists with ADL's. She uses a RW for household distances, w/c for community distances. Pt has hx of dementia and does demonstrate some confusion throughout session but is oriented x 4. This date pt performs mobility including bed mobility with maxA of 2, STS with modA of 2. She was able to perform stand pivot transfer to chair with RW and Simon of 2, max verbal cueing for sequencing of task. Pt did endorse some soreness with mobility, but denies any pain pre or post. Pt presents as functioning below her baseline, with deficits in mobility including transfers, gait, balance, and activity tolerance. Pt will benefit from skilled therapy services to address stated deficits to promote return to highest level of function, independence, and safety. Will continue to follow.      [address] [facility] [address] [address] AM-PAC Ramiro Clicks Basic Mobility Inpatient Short Form  AM-PAC Mobility Inpatient   How much difficulty turning over in bed?: A Lot  How much difficulty sitting down on / standing up from a chair with arms?: A Lot  How much difficulty moving from lying on back to sitting on side of bed?: A Lot  How much help from another person moving to and from a bed to a chair?: A Lot  How much help from another person needed to walk in hospital room?: A Lot  How much help from another person for climbing 3-5 steps with a railing?: Total  AM-PAC Inpatient Mobility Raw Score : 11  AM-PAC Inpatient T-Scale Score : 33.86  Mobility Inpatient CMS 0-100% Score: 72.57  Mobility Inpatient CMS G-Code Modifier : CL    SUBJECTIVE:   Ms. Quesada Falling states, \"I don't want to fall\"     Social/Functional Lives With: Family, Daughter  Type of Home: House  Home Layout: Two level, Performs ADL's on one level  Home Access: Level entry  Home Equipment: Theador Corpus, rolling, Wheelchair-manual  Has the patient had two or more falls in the past year or any fall with injury in the past year?: Yes  ADL Assistance: Needs assistance  Ambulation Assistance: Needs assistance  Transfer Assistance: Independent    OBJECTIVE:     PAIN: Acitlyn Leather / O2: Zulema Carl / Martin Hernandez / Gaby Lipan:   Pre Treatment:   Pain Assessment: None - Denies Pain      Post Treatment: 0 Vitals        Oxygen      Ruvalcaba Catheter and IV    RESTRICTIONS/PRECAUTIONS:                    GROSS EVALUATION: B LE Intact Impaired (Comments):   AROM [x]  R LE postop limitations   PROM []    Strength []  R LE postop limitations   Balance [] Posture: Fair  Sitting - Static: Fair, +  Sitting - Dynamic: Fair  Standing - Static: Fair, -  Standing - Dynamic: Fair, -   Posture [] Forward Head  Rounded Shoulders   Sensation []     Coordination []      Tone []     Edema []    Activity Tolerance [] Patient Tolerated treatment well, Patient limited by pain    []      COGNITION/  PERCEPTION: Intact Impaired (Comments):   Orientation [x] Vision [x]     Hearing []  [place]   Cognition  []  dementia     MOBILITY: I Mod I S SBA CGA Min Mod Max Total  NT x2 Comments:   Bed Mobility    Rolling [] [] [] [] [] [] [] [x] [] [] [x]    Supine to Sit [] [] [] [] [] [] [] [x] [] [] [x]    Scooting [] [] [] [] [] [] [] [x] [] [] [x]    Sit to Supine [] [] [] [] [] [] [] [] [] [x] []    Transfers    Sit to Stand [] [] [] [] [] [] [x] [] [] [] [x]    Bed to Chair [] [] [] [] [] [x] [] [] [] [] [x]    Stand to Sit [] [] [] [] [] [] [x] [] [] [] [x]     [] [] [] [] [] [] [] [] [] [] []    I=Independent, Mod I=Modified Independent, S=Supervision, SBA=Standby Assistance, CGA=Contact Guard Assistance,   Min=Minimal Assistance, Mod=Moderate Assistance, Max=Maximal Assistance, Total=Total Assistance, NT=Not Tested    GAIT: I Mod I S SBA CGA Min Mod Max Total  NT x2 Comments:   Level of Assistance [] [] [] [] [] [x] [] [] [] [] [x]    Distance   Pivot steps to chair    DME Gait Belt and Rolling Walker    Gait Quality Antalgic, Decreased kylie , Decreased step clearance, and Decreased step length    Weightbearing Status      Stairs      I=Independent, Mod I=Modified Independent, S=Supervision, SBA=Standby Assistance, CGA=Contact Guard Assistance,   Min=Minimal Assistance, Mod=Moderate Assistance, Max=Maximal Assistance, Total=Total Assistance, NT=Not Tested    PLAN:   ACUTE PHYSICAL THERAPY GOALS:   (Developed with and agreed upon by patient and/or caregiver.)    (1.) Montana Medellin  will move from supine to sit and sit to supine  with MINIMAL ASSIST within 7 treatment day(s). (2.) Montana Medellin will transfer from bed to chair and chair to bed with STAND BY ASSIST using the least restrictive device within 7 treatment day(s). (3.) Montana Medellin will ambulate with STAND BY ASSIST for 50 feet with the least restrictive device within 7 treatment day(s).    (4.) Montana Medellin will perform standing static and dynamic balance activities x 10 minutes with SUPERVISION to improve safety within 7 treatment day(s). (5.) Kim Melendez will perform bilateral lower extremity exercises x 20 min for HEP with INDEPENDENCE to improve strength, endurance, and functional mobility within 7 treatment day(s). FREQUENCY AND DURATION: BID for duration of hospital stay or until stated goals are met, whichever comes first.    THERAPY PROGNOSIS: Good    PROBLEM LIST:   (Skilled intervention is medically necessary to address:)  Decreased ADL/Functional Activities  Decreased Activity Tolerance  Decreased AROM/PROM  Decreased Balance  Decreased Cognition  Decreased Gait Ability  Decreased Safety Awareness  Decreased Strength  Decreased Transfer Abilities  Increased Pain INTERVENTIONS PLANNED:   (Benefits and precautions of physical therapy have been discussed with the patient.)  Therapeutic Activity  Therapeutic Exercise/HEP  Neuromuscular Re-education  Gait Training  Education       TREATMENT:   EVALUATION: MODERATE COMPLEXITY: (Untimed Charge)    TREATMENT:   Co-Treatment PT/OT necessary due to patient's decreased overall endurance/tolerance levels, as well as need for high level skilled assistance to complete functional transfers/mobility and functional tasks  Therapeutic Activity (27 Minutes): Therapeutic activity included Rolling, Supine to Sit, Scooting, Transfer Training, Ambulation on level ground, Sitting balance , and Standing balance to improve functional Activity tolerance, Balance, Mobility, and Strength. TREATMENT GRID:  N/A    AFTER TREATMENT PRECAUTIONS: Bed/Chair Locked, Call light within reach, Chair, Needs within reach, RN notified, and Visitors at bedside    INTERDISCIPLINARY COLLABORATION:  RN/ PCT and OT/ GARCIA    EDUCATION: Education Given To: Patient; Family  Education Provided: Role of Therapy;Plan of Care;Precautions;Transfer Training;Family Education;Equipment; Fall Prevention Strategies  Education Method: Verbal  Barriers to Learning: Cognition  Education Outcome: Verbalized understanding;Continued education needed    TIME IN/OUT:  Time In: 1027  Time Out: 1102  Minutes: [facility] CAMILA BUENO

## 2022-08-23 NOTE — CARE COORDINATION
CM continuing to follow for ongoing discharge planning. Therapy is recommending STR at discharge. CM in to speak with patient and one of her daughters at bedside. Therapy recommendations reviewed and patient's family confirms they were anticipating this recommendation. Patient has previously been to [facility]. List of Medicare certified facilities left with patient and her daughter to review with family. They will call CM with their top three choices and referrals will be sent. No PPD noted. Order placed as well as order for PCR Covid testing. No insurance authorization will be necessary for patient to discharge to SNF and patient has 3 inpatient Medicare nights. If bed offer was received for tomorrow (8/24), patient was medically stable, and facility could accept CXR to r/o TB, patient could possible discharge tomorrow. CM will continue to follow for ongoing discharge planning.

## 2022-08-23 NOTE — PLAN OF CARE
Problem: Pain  Goal: Verbalizes/displays adequate comfort level or baseline comfort level  Outcome: Progressing     Problem: Discharge Planning  Goal: Discharge to home or other facility with appropriate resources  Outcome: Progressing     Problem: Safety - Adult  Goal: Free from fall injury  Outcome: Progressing  Flowsheets (Taken 8/23/2022 0755 by Shayna Porras RN)  Free From Fall Injury: Instruct family/caregiver on patient safety     Problem: ABCDS Injury Assessment  Goal: Absence of physical injury  Outcome: Progressing     Problem: Skin/Tissue Integrity  Goal: Absence of new skin breakdown  Description: 1. Monitor for areas of redness and/or skin breakdown  2. Assess vascular access sites hourly  3. Every 4-6 hours minimum:  Change oxygen saturation probe site  4. Every 4-6 hours:  If on nasal continuous positive airway pressure, respiratory therapy assess nares and determine need for appliance change or resting period.   Outcome: Progressing

## 2022-08-23 NOTE — PROGRESS NOTES
on thyroid hormone supplement. She will confirm the dosage with us later today. I have discussed the plan of care with patient and family member, daughter, at bedside. Discharge Planning:      to be determined    will help. Diet:  ADULT DIET; Regular  DVT PPx: aspirin   Code status: Full Code    Hospital Problems:  Principal Problem:    Hip fracture requiring operative repair, left, closed, initial encounter [facility])  Active Problems:    Essential hypertension    Acquired hypothyroidism  Resolved Problems:    * No resolved hospital problems. *      Objective:   Patient Vitals for the past 24 hrs:   Temp Pulse Resp BP SpO2   08/23/22 0724 99.3 °F (37.4 °C) 70 17 (!) 152/74 91 %   08/23/22 0319 98.8 °F (37.1 °C) 68 16 134/63 (!) 88 %   08/22/22 2337 97.9 °F (36.6 °C) 66 16 139/64 91 %   08/22/22 2108 98.2 °F (36.8 °C) 68 16 (!) 151/62 96 %   08/22/22 1542 98.4 °F (36.9 °C) 66 20 117/65 91 %   08/22/22 1149 98.6 °F (37 °C) 66 20 132/67 91 %         Oxygen Therapy  SpO2: 91 %  Pulse via Oximetry: 63 beats per minute  Pulse Oximeter Device Mode: Intermittent  Pulse Oximeter Device Location: Right  O2 Device: None (Room air)  O2 Flow Rate (L/min): 3 L/min    There is no height or weight on file to calculate BMI. Intake/Output Summary (Last 24 hours) at 8/23/2022 0823  Last data filed at 8/22/2022 1852  Gross per 24 hour   Intake 720 ml   Output 1550 ml   Net -830 ml           Physical Exam:     Blood pressure (!) 152/74, pulse 70, temperature 99.3 °F (37.4 °C), temperature source Oral, resp. rate 17, SpO2 91 %. General:          Well nourished. Patient is lying in bed and appears comfortable. She is talking well. Hard of hearing. Daughter is at the bedside. patient reports no complaints. Head:               Normocephalic, atraumatic  Eyes:               Sclerae appear normal.  Pupils equally round. ENT:                Nares appear normal, no drainage.   Moist oral mucosa  Neck: No restricted ROM. Trachea midline   CV:                  RRR. No m/r/g. No jugular venous distension. Lungs:             CTAB. No wheezing, rhonchi, or rales. Symmetric expansion. Abdomen: Bowel sounds present. Soft, nontender, nondistended. Extremities:     right upper thigh on the lateral aspect with dressing and elastic bandage. No bleeding. No discharge. Skin:                No rashes and normal coloration. Warm and dry. Neuro:             CN II-XII grossly intact. Sensation intact. A&Ox3  Psych:             Normal mood and affect.          I have personally reviewed labs and tests showing:  Recent Labs:  Recent Results (from the past 48 hour(s))   TYPE AND SCREEN    Collection Time: 08/21/22  8:55 AM   Result Value Ref Range    Crossmatch expiration date 08/24/2022,2359     ABO/Rh O POSITIVE     Antibody Screen NEG    CBC    Collection Time: 08/21/22  8:57 AM   Result Value Ref Range    WBC 9.7 4.3 - 11.1 K/uL    RBC 4.71 4.05 - 5.2 M/uL    Hemoglobin 13.1 11.7 - 15.4 g/dL    Hematocrit 41.0 35.8 - 46.3 %    MCV 87.0 79.6 - 97.8 FL    MCH 27.8 26.1 - 32.9 PG    MCHC 32.0 31.4 - 35.0 g/dL    RDW 14.5 11.9 - 14.6 %    Platelets 064 923 - 656 K/uL    MPV 11.6 9.4 - 12.3 FL    nRBC 0.00 0.0 - 0.2 K/uL   Comprehensive Metabolic Panel    Collection Time: 08/21/22  8:57 AM   Result Value Ref Range    Sodium 134 (L) 136 - 145 mmol/L    Potassium 4.8 3.5 - 5.1 mmol/L    Chloride 107 98 - 107 mmol/L    CO2 25 21 - 32 mmol/L    Anion Gap 2 (L) 7 - 16 mmol/L    Glucose 89 65 - 100 mg/dL    BUN 24 (H) 8 - 23 MG/DL    Creatinine 1.10 (H) 0.6 - 1.0 MG/DL    GFR African American 60 (L) >60 ml/min/1.73m2    GFR Non- 49 (L) >60 ml/min/1.73m2    Calcium 9.4 8.3 - 10.4 MG/DL    Total Bilirubin 0.7 0.2 - 1.1 MG/DL    ALT 30 12 - 65 U/L    AST 36 15 - 37 U/L    Alk Phosphatase 69 50 - 136 U/L    Total Protein 7.9 6.3 - 8.2 g/dL    Albumin 3.3 3.2 - 4.6 g/dL    Globulin 4.6 (H) 2.3 - 3.5 g/dL    Albumin/Globulin Ratio 0.7 (L) 1.2 - 3.5     Basic Metabolic Panel    Collection Time: 08/21/22  3:08 PM   Result Value Ref Range    Sodium 138 136 - 145 mmol/L    Potassium 3.9 3.5 - 5.1 mmol/L    Chloride 106 98 - 107 mmol/L    CO2 24 21 - 32 mmol/L    Anion Gap 8 7 - 16 mmol/L    Glucose 135 (H) 65 - 100 mg/dL    BUN 22 8 - 23 MG/DL    Creatinine 1.10 (H) 0.6 - 1.0 MG/DL    GFR African American 60 (L) >60 ml/min/1.73m2    GFR Non- 49 (L) >60 ml/min/1.73m2    Calcium 8.8 8.3 - 10.4 MG/DL       I have personally reviewed imaging studies showing: Other Studies:  XR HIP RIGHT (1 VIEW)   Final Result   Typical postsurgical changes. XR HIP RIGHT (2-3 VIEWS)   Final Result   Postsurgical change as described. NC XR TECHNOLOGIST SERVICE   Final Result      CT HIP RIGHT WO CONTRAST   Final Result   There appears to be fracture in the anterolateral head of the right femur. The   age of this fracture is unclear as there is no associated fluid or soft tissue   edema. Please correlate clinically. XR HIP 2-3 VW W PELVIS RIGHT   Final Result   Findings as above.              Current Meds:  Current Facility-Administered Medications   Medication Dose Route Frequency    FLUoxetine (PROZAC) capsule 40 mg  40 mg Oral Daily    sodium chloride flush 0.9 % injection 5-40 mL  5-40 mL IntraVENous 2 times per day    sodium chloride flush 0.9 % injection 5-40 mL  5-40 mL IntraVENous PRN    0.9 % sodium chloride infusion   IntraVENous PRN    ondansetron (ZOFRAN-ODT) disintegrating tablet 4 mg  4 mg Oral Q8H PRN    Or    ondansetron (ZOFRAN) injection 4 mg  4 mg IntraVENous Q6H PRN    polyethylene glycol (GLYCOLAX) packet 17 g  17 g Oral Daily PRN    acetaminophen (TYLENOL) tablet 650 mg  650 mg Oral Q6H PRN    Or    acetaminophen (TYLENOL) suppository 650 mg  650 mg Rectal Q6H PRN    0.9 % sodium chloride infusion   IntraVENous Continuous    lactated ringers infusion   IntraVENous Continuous

## 2022-08-23 NOTE — PROGRESS NOTES
OCCUPATIONAL THERAPY Initial Assessment, Daily Note, and AM       OT Visit Days: 1  Acknowledge Orders  Time  OT Charge Capture  Rehab Caseload Tracker      Cheri Seymour is a 80 y.o. female   PRIMARY DIAGNOSIS: Hip fracture requiring operative repair, left, closed, initial encounter ([facility] [address]  Fall from bed, initial encounter [W06. XXXA]  Closed fracture of neck of right femur, initial encounter ([facility] [address] [S72.001A]  [facility] requiring operative repair, left, [facility] [S72.002A]  Procedure(s) (LRB):  HIP OPEN REDUCTION INTERNAL FIXATION FNS RIGHT (Right)  2 Days Post-Op  Reason for Referral: Pain in Right Hip (M25.551)  Stiffness of Right Hip, Not elsewhere classified (M25.651)  Generalized Muscle Weakness (M62.81)  Other lack of cordination (R27.8)  History of falling (Z91.81)  Inpatient: Payor: MEDICARE / Plan: MEDICARE PART A AND B / Product Type: *No Product type* /     ASSESSMENT:     REHAB RECOMMENDATIONS:   Recommendation to date pending progress:  Setting:  Short-term Rehab    Equipment:    Rolling Walker     ASSESSMENT:  Ms. Arsalan Atwood presents to the hospital after sustaining a fall out of bed. Pt found to have a R femur fracture and is s/p ORIF of the R hip. Pt is WBAT in the R LE. Pt is alert, pleasant, and oriented to person, place, and time (with some additional cueing). Pt denies any pain at rest. Pt's family is at bedside and very supportive. Pt uses a rolling walker at baseline and gets some assistance from her daughter with ADL. Pt demonstrates some stiffness and pain with bed mobility and needed moderate to maximal assistance x 2 to sit edge of bed with encouragement to scoot until feet are on the floor. Pt stood with moderate assistance x 2 and was able to take slow steps over to the chair (progressed to min A x 2). Pt left up in the chair with all needs at bedside.  Pt is currently functioning below her baseline and will benefit from OT services to address stated goals and plan of care.       [address] [facility] [address] [address] [facility] 6 Clicks Daily Activity Inpatient Short Form:    AM-PAC Daily Activity Inpatient   How much help for putting on and taking off regular lower body clothing?: A Lot  How much help for Bathing?: A Lot  How much help for Toileting?: A Lot  How much help for putting on and taking off regular upper body clothing?: A Little  How much help for taking care of personal grooming?: A Lot  How much help for eating meals?: A Little  [facility] Inpatient Daily Activity Raw Score: 14  AM-PAC Inpatient ADL T-Scale Score : 33.39  ADL Inpatient CMS 0-100% Score: 59.67  ADL Inpatient CMS G-Code Modifier : CK           SUBJECTIVE:     Ms. Lola Hernandez states, \"It's August\"     Social/Functional Lives With: Family, Daughter  Type of Home: House  Home Layout: Two level, Performs ADL's on one level  Home Access: Level entry  Home Equipment: Waynetta Bears, rolling, Wheelchair-manual  Has the patient had two or more falls in the past year or any fall with injury in the past year?: Yes  ADL Assistance: Needs assistance  Ambulation Assistance: Needs assistance  Transfer Assistance: Independent    OBJECTIVE:     Tia Baptiste / Roberto Boyer / AIRWAY: IV    RESTRICTIONS/PRECAUTIONS:  Restrictions/Precautions: Fall Risk, Weight Bearing  Right Lower Extremity Weight Bearing: Weight Bearing As Tolerated    PAIN: VITALS / O2:   Pre Treatment:   Pain Assessment: None - Denies Pain      Post Treatment: same       Vitals          Oxygen            GROSS EVALUATION: INTACT IMPAIRED   (See Comments)   UE AROM [] [] Generally decreased but functional    UE PROM [] []   Strength []  Generalized weakness, R LE weaker      Posture / Balance []  Fair sitting; fair- to poor standing    Sensation []     Coordination []  Generally slowed      Tone [x]       Edema [x]    Activity Tolerance []  Limited by fatigue/pain      Hand Dominance R [x] L []      COGNITION/  PERCEPTION: INTACT IMPAIRED   (See Comments)   Orientation []  Oriented to person, place; needed additional cues for month   Vision []     Hearing []  Slightly hard of hearing    Cognition  [] Overall Cognitive Status: Exceptions  Following Commands:  Follows one step commands consistently  Memory: Decreased recall of biographical Information  Safety Judgement: Decreased awareness of need for safety  Insights: Decreased awareness of deficits   Perception [x]       MOBILITY: I Mod I S SBA CGA Min Mod Max Total  NT x2 Comments:   Bed Mobility    Rolling [] [] [] [] [] [] [] [] [] [x] []    Supine to Sit [] [] [] [] [] [] [x] [x] [] [] [x]    Scooting [] [] [] [] [] [x] [] [] [] [] [x]    Sit to Supine [] [] [] [] [] [] [] [] [] [x] []    Transfers    Sit to Stand [] [] [] [] [] [] [x] [] [] [] [x]    Bed to Chair [] [] [] [] [] [x] [x] [] [] [] [x]    Stand to Sit [] [] [] [] [] [x] [x] [] [] [] [x]    Tub/Shower [] [] [] [] [] [] [] [] [] [x] []     Toilet [] [] [] [] [] [] [] [] [] [x] []      [] [] [] [] [] [] [] [] [] [] []    I=Independent, Mod I=Modified Independent, S=Supervision/Setup, SBA=Standby Assistance, CGA=Contact Guard Assistance, Min=Minimal Assistance, Mod=Moderate Assistance, Max=Maximal Assistance, Total=Total Assistance, NT=Not Tested    ACTIVITIES OF DAILY LIVING: I Mod I S SBA CGA Min Mod Max Total NT Comments   BASIC ADLs:              Upper Body Bathing  [] [] [] [] [] [] [] [] [] [x]    Lower Body Bathing [] [] [] [] [] [] [] [] [] [x]    Toileting [] [] [] [] [] [] [] [] [] [x]    Upper Body Dressing [] [] [] [] [] [] [] [] [] [x]    Lower Body Dressing [] [] [] [] [] [] [] [x] [] [] Donning socks   Feeding [] [] [] [] [] [] [] [] [] []    Grooming [] [] [] [] [] [] [] [] [] [x]    Personal Device Care [] [] [] [] [] [] [] [] [] [x]    Functional Mobility [] [] [] [] [] [x] [x] [] [] [] X 2 using R   I=Independent, Mod I=Modified Independent, S=Supervision/Setup, SBA=Standby Assistance, CGA=Contact Guard Assistance, Min=Minimal Assistance, Mod=Moderate Assistance, Max=Maximal Assistance, Total=Total Assistance, NT=Not Tested    PLAN:     FREQUENCY/DURATION   OT Plan of Care: 5 times/week for duration of hospital stay or until stated goals are met, whichever comes first.    ACUTE OCCUPATIONAL THERAPY GOALS:   (Developed with and agreed upon by patient and/or caregiver.)  1. Patient will complete lower body bathing and dressing with minimal assistance and adaptive equipment as needed. 2. Patient will complete toileting with minimal assistance. 3. Patient will tolerate 30 minutes of OT treatment with 2-3 rest breaks to increase activity tolerance for ADLs. 4. Patient will complete functional transfers with minimal assistance and adaptive equipment as needed. 5. Patient will complete functional mobility for household distances with minimal assistance and appropriate safety awareness. Timeframe: 7 visits         PROBLEM LIST:   (Skilled intervention is medically necessary to address:)  Decreased ADL/Functional Activities  Decreased Activity Tolerance  Decreased AROM/PROM  Decreased Balance  Decreased Cognition  Decreased Coordination  Decreased Gait Ability  Decreased Safety Awareness  Decreased Strength  Decreased Transfer Abilities  Increased Pain   INTERVENTIONS PLANNED:  (Benefits and precautions of occupational therapy have been discussed with the patient.)  Self Care Training  Therapeutic Activity  Therapeutic Exercise/HEP  Neuromuscular Re-education  Manual Therapy  Education         TREATMENT:     EVALUATION: MODERATE COMPLEXITY: (Untimed Charge)    TREATMENT:   Neuromuscular Re-education (23 Minutes): Neuromuscular Re-education included Balance Training, Coordination training, Postural training, Sitting balance training, and Standing balance training to improve Balance, Coordination, Functional Mobility, and Postural Control.     TREATMENT GRID:  N/A    AFTER TREATMENT PRECAUTIONS: Bed/Chair Locked, Call light within reach, Chair, Needs within reach, RN notified, and Visitors at bedside    INTERDISCIPLINARY COLLABORATION:  RN/ PCT, PT/ PTA, and OT/ GARCIA    EDUCATION:  Education Given To: Family; Patient  Education Provided: Role of Therapy;Plan of Care  Education Method: Verbal;Demonstration  Education Outcome: Verbalized understanding;Continued education needed    TOTAL TREATMENT DURATION AND TIME:  Time In: 1027  Time Out: [facility]  Minutes: [facility], OT

## 2022-08-23 NOTE — PROGRESS NOTES
PHYSICAL THERAPY: Daily Note PM   (Link to Caseload Tracking: PT Visit Days : 1  Time In/Out PT Charge Capture  Rehab Caseload Tracker  Orders    Alfonso Bhandari is a 80 y.o. female   PRIMARY DIAGNOSIS: Hip fracture requiring operative repair, left, closed, initial encounter ([facility] [address]  Fall from bed, initial encounter [W06. XXXA]  Closed fracture of neck of right femur, initial encounter ([facility] [address] [S72.001A]  Hip fracture requiring operative repair, left, closed, initial encounter ([facility] [address] [S72.002A]  Procedure(s) (LRB):  HIP OPEN REDUCTION INTERNAL FIXATION FNS RIGHT (Right)  2 Days Post-Op  Inpatient: Payor: Venda Gut / Plan: MEDICARE PART A AND B / Product Type: *No Product type* /     ASSESSMENT:     REHAB RECOMMENDATIONS:   Recommendation to date pending progress:  Setting:  Short-term Rehab    Equipment:    To Be Determined     ASSESSMENT:  Ms. Cony Briones was received sitting in chair, agreeable to therapy. She required mod-maxA of 2 and RW to stand and perform stand pivot transfer back to bed. Pt more fatigued this PM requiring more assistance and manual cues for weight shifting to take steps. She returned to supine with maxA of 2. Some progress, will continue to follow.       SUBJECTIVE:   Ms. Cony Briones states, \"it hurts but we have to do it\"     Social/Functional Lives With: Family, Daughter  Type of Home: House  Home Layout: Two level, Performs ADL's on one level  Home Access: Level entry  Home Equipment: Kristian Larios, rolling, Wheelchair-manual  Has the patient had two or more falls in the past year or any fall with injury in the past year?: Yes  ADL Assistance: Needs assistance  Ambulation Assistance: Needs assistance  Transfer Assistance: Independent  OBJECTIVE:     PAIN: Drena Bi / O2: PRECAUTION / Alonso Im / DRAINS:   Pre Treatment:   Pain Assessment: None - Denies Pain      Post Treatment: 0 Vitals        Oxygen    Ruvalcaba Catheter and IV    RESTRICTIONS/PRECAUTIONS:  Restrictions/Precautions  Restrictions/Precautions: Fall Risk, Weight Bearing  Lower Extremity Weight Bearing Restrictions  Right Lower Extremity Weight Bearing: Weight Bearing As Tolerated  Restrictions/Precautions: Fall Risk, Weight Bearing     MOBILITY: I Mod I S SBA CGA Min Mod Max Total  NT x2 Comments:   Bed Mobility    Rolling [] [] [] [] [] [] [] [x] [] [] [x]    Supine to Sit [] [] [] [] [] [] [] [] [] [x] []    Scooting [] [] [] [] [] [] [] [x] [] [] [x]    Sit to Supine [] [] [] [] [] [] [] [x] [] [] [x]    Transfers    Sit to Stand [] [] [] [] [] [] [x] [x] [] [] [x]    Bed to Chair [] [] [] [] [] [] [x] [x] [] [] [x]    Stand to Sit [] [] [] [] [] [] [x] [x] [] [] [x]     [] [] [] [] [] [] [] [] [] [] []    I=Independent, Mod I=Modified Independent, S=Supervision, SBA=Standby Assistance, CGA=Contact Guard Assistance,   Min=Minimal Assistance, Mod=Moderate Assistance, Max=Maximal Assistance, Total=Total Assistance, NT=Not Tested    BALANCE: Good Fair+ Fair Fair- Poor NT Comments   Sitting Static [] [x] [] [] [] []    Sitting Dynamic [] [] [x] [] [] []              Standing Static [] [] [] [x] [] []    Standing Dynamic [] [] [] [] [x] []      GAIT: I Mod I S SBA CGA Min Mod Max Total  NT x2 Comments:   Level of Assistance [] [] [] [] [] [] [x] [x] [] [] [x]    Distance   Pivot steps to chair    DME Gait Belt and Rolling Walker    Gait Quality Antalgic, Decreased kylie , Decreased step clearance, and Decreased step length    Weightbearing Status Right Lower Extremity Weight Bearing: Weight Bearing As Tolerated    Stairs      I=Independent, Mod I=Modified Independent, S=Supervision, SBA=Standby Assistance, CGA=Contact Guard Assistance,   Min=Minimal Assistance, Mod=Moderate Assistance, Max=Maximal Assistance, Total=Total Assistance, NT=Not Tested    PLAN:   ACUTE PHYSICAL THERAPY GOALS:   (Developed with and agreed upon by patient and/or caregiver.)    (1.) Ezra Caul  will move from supine to sit and sit to supine  with MINIMAL ASSIST within 7 treatment day(s). (2.) Becca Stevens will transfer from bed to chair and chair to bed with STAND BY ASSIST using the least restrictive device within 7 treatment day(s). (3.) Becca Stevens will ambulate with STAND BY ASSIST for 50 feet with the least restrictive device within 7 treatment day(s). (4.) Becca Stevens will perform standing static and dynamic balance activities x 10 minutes with SUPERVISION to improve safety within 7 treatment day(s). (5.) Becca Stevens will perform bilateral lower extremity exercises x 20 min for HEP with INDEPENDENCE to improve strength, endurance, and functional mobility within 7 treatment day(s). FREQUENCY AND DURATION: BID for duration of hospital stay or until stated goals are met, whichever comes first.    TREATMENT:   TREATMENT:   Therapeutic Activity (13 Minutes): Therapeutic activity included Rolling, Sit to Supine, Scooting, Transfer Training, Ambulation on level ground, Sitting balance , and Standing balance to improve functional Activity tolerance, Balance, Mobility, and Strength. TREATMENT GRID:  N/A    AFTER TREATMENT PRECAUTIONS: Bed, Bed/Chair Locked, Call light within reach, Needs within reach, and RN notified    INTERDISCIPLINARY COLLABORATION:  RN/ PCT and PT/ PTA    EDUCATION: Education Given To: Patient; Family  Education Provided: Role of Therapy;Plan of Care;Precautions;Transfer Training;Family Education;Equipment; Fall Prevention Strategies  Education Method: Verbal  Barriers to Learning: Cognition  Education Outcome: Verbalized understanding;Continued education needed    TIME IN/OUT:  Time In: 1335  Time Out: [address]  Minutes: [address] XIMENA PT

## 2022-08-24 LAB
MM INDURATION, POC: 0 MM (ref 0–5)
PPD, POC: NEGATIVE

## 2022-08-24 PROCEDURE — 1100000000 HC RM PRIVATE

## 2022-08-24 PROCEDURE — 97110 THERAPEUTIC EXERCISES: CPT

## 2022-08-24 PROCEDURE — 6370000000 HC RX 637 (ALT 250 FOR IP): Performed by: ORTHOPAEDIC SURGERY

## 2022-08-24 PROCEDURE — 6370000000 HC RX 637 (ALT 250 FOR IP): Performed by: INTERNAL MEDICINE

## 2022-08-24 PROCEDURE — 97530 THERAPEUTIC ACTIVITIES: CPT

## 2022-08-24 PROCEDURE — 6370000000 HC RX 637 (ALT 250 FOR IP): Performed by: HOSPITALIST

## 2022-08-24 PROCEDURE — 2580000003 HC RX 258: Performed by: ORTHOPAEDIC SURGERY

## 2022-08-24 PROCEDURE — 2580000003 HC RX 258: Performed by: INTERNAL MEDICINE

## 2022-08-24 RX ORDER — AMLODIPINE BESYLATE 5 MG/1
5 TABLET ORAL DAILY
Status: DISCONTINUED | OUTPATIENT
Start: 2022-08-24 | End: 2022-08-29 | Stop reason: HOSPADM

## 2022-08-24 RX ORDER — METOPROLOL SUCCINATE 50 MG/1
50 TABLET, EXTENDED RELEASE ORAL DAILY
Status: DISCONTINUED | OUTPATIENT
Start: 2022-08-24 | End: 2022-08-29 | Stop reason: HOSPADM

## 2022-08-24 RX ADMIN — SODIUM CHLORIDE, PRESERVATIVE FREE 10 ML: 5 INJECTION INTRAVENOUS at 08:40

## 2022-08-24 RX ADMIN — ACETAMINOPHEN 650 MG: 325 TABLET ORAL at 17:16

## 2022-08-24 RX ADMIN — AMLODIPINE BESYLATE 5 MG: 5 TABLET ORAL at 09:42

## 2022-08-24 RX ADMIN — METOPROLOL SUCCINATE 50 MG: 50 TABLET, EXTENDED RELEASE ORAL at 09:42

## 2022-08-24 RX ADMIN — SODIUM CHLORIDE, PRESERVATIVE FREE 10 ML: 5 INJECTION INTRAVENOUS at 20:57

## 2022-08-24 RX ADMIN — FLUOXETINE HYDROCHLORIDE 40 MG: 20 CAPSULE ORAL at 08:39

## 2022-08-24 RX ADMIN — ASPIRIN 325 MG: 325 TABLET, COATED ORAL at 20:57

## 2022-08-24 RX ADMIN — ASPIRIN 325 MG: 325 TABLET, COATED ORAL at 08:39

## 2022-08-24 RX ADMIN — TRAZODONE HYDROCHLORIDE 100 MG: 50 TABLET ORAL at 20:57

## 2022-08-24 ASSESSMENT — PAIN DESCRIPTION - LOCATION: LOCATION: HIP

## 2022-08-24 ASSESSMENT — PAIN SCALES - GENERAL
PAINLEVEL_OUTOF10: 3
PAINLEVEL_OUTOF10: 0
PAINLEVEL_OUTOF10: 0

## 2022-08-24 ASSESSMENT — PAIN DESCRIPTION - ORIENTATION: ORIENTATION: RIGHT

## 2022-08-24 ASSESSMENT — PAIN DESCRIPTION - DESCRIPTORS: DESCRIPTORS: ACHING

## 2022-08-24 NOTE — PROGRESS NOTES
PHYSICAL THERAPY: Daily Note AM   (Link to Caseload Tracking: PT Visit Days : 2  Time In/Out PT Charge Capture  Rehab Caseload Tracker  Orders    Delores Rodney is a 80 y.o. female   PRIMARY DIAGNOSIS: Hip fracture requiring operative repair, left, closed, initial encounter ([facility] [address]  Fall from bed, initial encounter [W06. XXXA]  Closed fracture of neck of right femur, initial encounter ([facility] [S72.001A]  [facility] fracture requiring operative repair, left, [facility] [S72.002A]  Procedure(s) (LRB):  HIP OPEN REDUCTION INTERNAL FIXATION FNS RIGHT (Right)  3 Days Post-Op  Inpatient: Payor: MEDICARE / Plan: MEDICARE PART A AND B / Product Type: *No Product type* /     ASSESSMENT:     REHAB RECOMMENDATIONS:   Recommendation to date pending progress:  Setting:  Short-term Rehab    Equipment:    To Be Determined     ASSESSMENT:  Ms. Joanie Cornejo was supine and slightly confused but participated well. She performed supine exercises below well. She was able to sit up with just a little help for her R leg. She practiced sit to stand numerous times requiring Mod A and a lot of verbal cueing to stand erect. Needed Mod A x2 to transfer to the chair with the walker.   Steady progress     SUBJECTIVE:   Ms. Joanie Cornejo states, \"you are so sweet\"     Social/Functional Lives With: Family, Daughter  Type of Home: House  Home Layout: Two level, Performs ADL's on one level  Home Access: Level entry  Home Equipment: Paras Janus, rolling, Wheelchair-manual  Has the patient had two or more falls in the past year or any fall with injury in the past year?: Yes  ADL Assistance: Needs assistance  Ambulation Assistance: Needs assistance  Transfer Assistance: Independent  OBJECTIVE:     PAIN: Guinevere Gilbert / O2: PRECAUTION / Enoc Roch / DRAINS:   Pre Treatment:          Post Treatment: 0 Vitals        Oxygen    None    RESTRICTIONS/PRECAUTIONS:  Restrictions/Precautions  Restrictions/Precautions: Fall Risk, Weight Bearing  Lower Extremity Weight Bearing Restrictions  Right Lower Extremity Weight Bearing: Weight Bearing As Tolerated  Restrictions/Precautions: Fall Risk, Weight Bearing     MOBILITY: I Mod I S SBA CGA Min Mod Max Total  NT x2 Comments:   Bed Mobility    Rolling [] [] [] [] [] [] [] [] [] [] []    Supine to Sit [] [] [] [] [] [x] [] [] [] [] []    Scooting [] [] [] [x] [] [] [] [] [] [] []    Sit to Supine [] [] [] [] [] [] [] [] [] [] []    Transfers    Sit to Stand [] [] [] [] [] [] [x] [] [] [] []    Bed to Chair [] [] [] [] [] [] [x] [] [] [] [x]    Stand to Sit [] [] [] [] [] [] [x] [] [] [] [x]     [] [] [] [] [] [] [] [] [] [] []    I=Independent, Mod I=Modified Independent, S=Supervision, SBA=Standby Assistance, CGA=Contact Guard Assistance,   Min=Minimal Assistance, Mod=Moderate Assistance, Max=Maximal Assistance, Total=Total Assistance, NT=Not Tested    BALANCE: Good Fair+ Fair Fair- Poor NT Comments   Sitting Static [] [x] [] [] [] []    Sitting Dynamic [] [] [x] [] [] []              Standing Static [] [] [] [x] [] []    Standing Dynamic [] [] [] [x] [x] []      GAIT: I Mod I S SBA CGA Min Mod Max Total  NT x2 Comments:   Level of Assistance [] [] [] [] [] [] [x] [] [] [] [x]    Distance   Pivot steps to chair    DME Rolling Walker    Gait Quality Antalgic, Decreased kylie , Decreased step clearance, and Decreased step length    Weightbearing Status      Stairs      I=Independent, Mod I=Modified Independent, S=Supervision, SBA=Standby Assistance, CGA=Contact Guard Assistance,   Min=Minimal Assistance, Mod=Moderate Assistance, Max=Maximal Assistance, Total=Total Assistance, NT=Not Tested    PLAN:   ACUTE PHYSICAL THERAPY GOALS:   (Developed with and agreed upon by patient and/or caregiver.)    (1.) Elvin Bloom  will move from supine to sit and sit to supine  with MINIMAL ASSIST within 7 treatment day(s).     (2.) Elvin Bloom will transfer from bed to chair and chair to bed with STAND BY ASSIST using the least restrictive device within 7 treatment day(s). (3.) Brianne Burroughs will ambulate with STAND BY ASSIST for 50 feet with the least restrictive device within 7 treatment day(s). (4.) Brianne Burroughs will perform standing static and dynamic balance activities x 10 minutes with SUPERVISION to improve safety within 7 treatment day(s). (5.) Brianne Burroughs will perform bilateral lower extremity exercises x 20 min for HEP with INDEPENDENCE to improve strength, endurance, and functional mobility within 7 treatment day(s). FREQUENCY AND DURATION: BID for duration of hospital stay or until stated goals are met, whichever comes first.    TREATMENT:   TREATMENT:   Therapeutic Activity (30 Minutes): Therapeutic activity included Supine to Sit, Scooting, Lateral Scooting, Transfer Training, Sitting balance , and Standing balance to improve functional Activity tolerance, Balance, Mobility, and Strength. Therapeutic Exercise (15 Minutes): Therapeutic exercises noted below to improve functional activity tolerance, AROM, strength, and mobility.      TREATMENT GRID:   Date:  8/24/22 Date:   Date:     Activity/Exercise Parameters Parameters Parameters   Supine AP 10x B     Supine heel slides 10x B AAR     Supine SAQ 10x B     Supine hip abdd 10x B AAR                           AFTER TREATMENT PRECAUTIONS: Bed/Chair Locked, Call light within reach, Chair, Needs within reach, RN notified, and Visitors at bedside    INTERDISCIPLINARY COLLABORATION:  RN/ PCT and PT/ PTA    EDUCATION:      TIME IN/OUT:  Time In: 1030  Time Out: 1115  Minutes: [address], PTA

## 2022-08-24 NOTE — PROGRESS NOTES
Hospitalist Progress Note   Admit Date:  2022 12:12 AM   Name:  Steve Agarwal   Age:  80 y.o. Sex:  female  :  1929   MRN:  [id number]   Room:  2/    Presenting Complaint: Fall     Reason(s) for Admission: Fall from bed, initial encounter [W06. XXXA]  Closed fracture of neck of right femur, initial encounter ([facility]) [S72.001A]  Hip fracture requiring operative repair, left, closed, initial encounter [facility]) [facility] [place] Course & Interval History:     Steve Agarwal is a 80 y.o. female with medical history of   Hypertension  hypothyroidism      who presented with right hip pain after a fall. Patient lives at home with her daughter. She was trying to get out of bed this morning. She slipped out of bed and fell on the floor. She did not hit her head. She was awake and alert the whole time. She landed on her right hip. She felt the pain and could not get up. She was brought to emergency room. She was found to have fracture of her right hip. Patient underwent right hip open reduction and internal fixation on 2022. No complications    [id number] Events (22): Feeling well. No complaints. No CP, SOB, fevers      Assessment & Plan:       Hip fracture requiring operative repair, left, closed, initial encounter [facility])  Plan:   -Work on placement at [facility]. Medically ready for DC       Essential hypertension  Plan:   -Controlled. Continue current management      Acquired hypothyroidism  Plan:   -Controlled. Continue current management    Diet:  ADULT DIET; Regular  DVT PPx: aspirin   Code status: Full Code    Hospital Problems:  Principal Problem:    Hip fracture requiring operative repair, left, closed, initial encounter [facility])  Active Problems:    Essential hypertension    Acquired hypothyroidism  Resolved Problems:    * No resolved hospital problems.  *      Objective:   Patient Vitals for the past 24 hrs:   Temp Pulse Resp BP SpO2   22 1146 98.6 °F (37 °C) 75 18 (!) 145/64 95 %   08/24/22 0820 98.4 °F (36.9 °C) 70 18 (!) 158/74 94 %   08/24/22 0431 99 °F (37.2 °C) 70 18 (!) 142/66 95 %   08/23/22 2301 98.4 °F (36.9 °C) 72 18 132/65 92 %   08/23/22 2000 98.7 °F (37.1 °C) 77 18 (!) 142/70 91 %   08/23/22 1617 98.6 °F (37 °C) 76 17 (!) 167/81 93 %         Oxygen Therapy  SpO2: 95 %  Pulse via Oximetry: 63 beats per minute  Pulse Oximeter Device Mode: Intermittent  Pulse Oximeter Device Location: Right  O2 Device: None (Room air)  O2 Flow Rate (L/min): 3 L/min    There is no height or weight on file to calculate BMI. Intake/Output Summary (Last 24 hours) at 8/24/2022 1152  Last data filed at 8/24/2022 0431  Gross per 24 hour   Intake --   Output 2000 ml   Net -2000 ml           Physical Exam:     Blood pressure (!) 145/64, pulse 75, temperature 98.6 °F (37 °C), temperature source Oral, resp. rate 18, SpO2 95 %. General:          Well nourished. Head:               Normocephalic, atraumatic  Eyes:               Sclerae appear normal.  Pupils equally round. ENT:                Nares appear normal, no drainage. Moist oral mucosa  Neck:               No restricted ROM. Trachea midline   CV:                  RRR. No jugular venous distension. Lungs:             Symmetric expansion. Abdomen:        nondistended. Extremities:     right upper thigh on the lateral aspect with dressing   Skin:                No rashes and normal coloration. Warm and dry. Neuro:             CN II-XII grossly intact. Sensation intact. Psych:             Normal mood and affect.          I have personally reviewed labs and tests showing:  Recent Labs:  Recent Results (from the past 48 hour(s))   COVID-19    Collection Time: 08/23/22  5:43 PM    Specimen: Nasopharyngeal Swab   Result Value Ref Range    SARS-CoV-2 Please find results under separate order     COVID-19, Rapid    Collection Time: 08/23/22  5:43 PM    Specimen: Nasopharyngeal   Result Value Ref Range Source NASAL      SARS-CoV-2, Rapid Not detected NOTD         I have personally reviewed imaging studies showing: Other Studies:  XR HIP RIGHT (1 VIEW)   Final Result   Typical postsurgical changes. XR HIP RIGHT (2-3 VIEWS)   Final Result   Postsurgical change as described. NC XR TECHNOLOGIST SERVICE   Final Result      CT HIP RIGHT WO CONTRAST   Final Result   There appears to be fracture in the anterolateral head of the right femur. The   age of this fracture is unclear as there is no associated fluid or soft tissue   edema. Please correlate clinically. XR HIP 2-3 VW W PELVIS RIGHT   Final Result   Findings as above.              Current Meds:  Current Facility-Administered Medications   Medication Dose Route Frequency    metoprolol succinate (TOPROL XL) extended release tablet 50 mg  50 mg Oral Daily    amLODIPine (NORVASC) tablet 5 mg  5 mg Oral Daily    tuberculin injection 5 Units  5 Units IntraDERmal Once    FLUoxetine (PROZAC) capsule 40 mg  40 mg Oral Daily    sodium chloride flush 0.9 % injection 5-40 mL  5-40 mL IntraVENous 2 times per day    sodium chloride flush 0.9 % injection 5-40 mL  5-40 mL IntraVENous PRN    0.9 % sodium chloride infusion   IntraVENous PRN    ondansetron (ZOFRAN-ODT) disintegrating tablet 4 mg  4 mg Oral Q8H PRN    Or    ondansetron (ZOFRAN) injection 4 mg  4 mg IntraVENous Q6H PRN    polyethylene glycol (GLYCOLAX) packet 17 g  17 g Oral Daily PRN    acetaminophen (TYLENOL) tablet 650 mg  650 mg Oral Q6H PRN    Or    acetaminophen (TYLENOL) suppository 650 mg  650 mg Rectal Q6H PRN    sodium chloride flush 0.9 % injection 5-40 mL  5-40 mL IntraVENous 2 times per day    sodium chloride flush 0.9 % injection 5-40 mL  5-40 mL IntraVENous PRN    0.9 % sodium chloride infusion   IntraVENous PRN    aspirin EC tablet 325 mg  325 mg Oral BID    traZODone (DESYREL) tablet 100 mg  100 mg Oral Nightly       Signed:  Arnaud Gutiérrez MD    Part of this note may have been written by using a voice dictation software. The note has been proof read but may still contain some grammatical/other typographical errors.

## 2022-08-25 LAB
MM INDURATION, POC: 0 MM (ref 0–5)
PPD, POC: NEGATIVE

## 2022-08-25 PROCEDURE — 2580000003 HC RX 258: Performed by: INTERNAL MEDICINE

## 2022-08-25 PROCEDURE — 1100000000 HC RM PRIVATE

## 2022-08-25 PROCEDURE — 97530 THERAPEUTIC ACTIVITIES: CPT

## 2022-08-25 PROCEDURE — 6370000000 HC RX 637 (ALT 250 FOR IP): Performed by: INTERNAL MEDICINE

## 2022-08-25 PROCEDURE — 6370000000 HC RX 637 (ALT 250 FOR IP): Performed by: HOSPITALIST

## 2022-08-25 PROCEDURE — 97112 NEUROMUSCULAR REEDUCATION: CPT

## 2022-08-25 PROCEDURE — 2580000003 HC RX 258: Performed by: ORTHOPAEDIC SURGERY

## 2022-08-25 PROCEDURE — 97535 SELF CARE MNGMENT TRAINING: CPT

## 2022-08-25 PROCEDURE — 6370000000 HC RX 637 (ALT 250 FOR IP): Performed by: ORTHOPAEDIC SURGERY

## 2022-08-25 RX ORDER — TRAMADOL HYDROCHLORIDE 50 MG/1
25 TABLET ORAL EVERY 6 HOURS PRN
Status: DISCONTINUED | OUTPATIENT
Start: 2022-08-25 | End: 2022-08-29 | Stop reason: HOSPADM

## 2022-08-25 RX ADMIN — SODIUM CHLORIDE, PRESERVATIVE FREE 10 ML: 5 INJECTION INTRAVENOUS at 22:24

## 2022-08-25 RX ADMIN — TRAMADOL HYDROCHLORIDE 25 MG: 50 TABLET ORAL at 14:26

## 2022-08-25 RX ADMIN — ASPIRIN 325 MG: 325 TABLET, COATED ORAL at 22:07

## 2022-08-25 RX ADMIN — ACETAMINOPHEN 650 MG: 325 TABLET ORAL at 23:52

## 2022-08-25 RX ADMIN — METOPROLOL SUCCINATE 50 MG: 50 TABLET, EXTENDED RELEASE ORAL at 08:57

## 2022-08-25 RX ADMIN — FLUOXETINE HYDROCHLORIDE 40 MG: 20 CAPSULE ORAL at 08:56

## 2022-08-25 RX ADMIN — SODIUM CHLORIDE, PRESERVATIVE FREE 10 ML: 5 INJECTION INTRAVENOUS at 08:57

## 2022-08-25 RX ADMIN — TRAZODONE HYDROCHLORIDE 100 MG: 50 TABLET ORAL at 22:06

## 2022-08-25 RX ADMIN — TRAMADOL HYDROCHLORIDE 25 MG: 50 TABLET ORAL at 22:07

## 2022-08-25 RX ADMIN — ACETAMINOPHEN 650 MG: 325 TABLET ORAL at 08:57

## 2022-08-25 RX ADMIN — AMLODIPINE BESYLATE 5 MG: 5 TABLET ORAL at 08:57

## 2022-08-25 RX ADMIN — ASPIRIN 325 MG: 325 TABLET, COATED ORAL at 08:56

## 2022-08-25 ASSESSMENT — PAIN DESCRIPTION - ORIENTATION
ORIENTATION: LEFT
ORIENTATION: LEFT
ORIENTATION: RIGHT
ORIENTATION: RIGHT

## 2022-08-25 ASSESSMENT — PAIN DESCRIPTION - DESCRIPTORS
DESCRIPTORS: ACHING

## 2022-08-25 ASSESSMENT — PAIN SCALES - GENERAL
PAINLEVEL_OUTOF10: 0
PAINLEVEL_OUTOF10: 0
PAINLEVEL_OUTOF10: 6
PAINLEVEL_OUTOF10: 3
PAINLEVEL_OUTOF10: 3
PAINLEVEL_OUTOF10: 6

## 2022-08-25 ASSESSMENT — PAIN DESCRIPTION - LOCATION
LOCATION: HIP
LOCATION: LEG;HIP
LOCATION: HIP
LOCATION: HIP

## 2022-08-25 ASSESSMENT — PAIN - FUNCTIONAL ASSESSMENT: PAIN_FUNCTIONAL_ASSESSMENT: PREVENTS OR INTERFERES SOME ACTIVE ACTIVITIES AND ADLS

## 2022-08-25 NOTE — PROGRESS NOTES
PHYSICAL THERAPY: Daily Note PM   (Link to Caseload Tracking: PT Visit Days : 3  Time In/Out PT Charge Capture  Rehab Caseload Tracker  Orders    Kristen Blake is a 80 y.o. female   PRIMARY DIAGNOSIS: Hip fracture requiring operative repair, left, closed, initial encounter ([facility] [address]  Fall from bed, initial encounter [W06. XXXA]  Closed fracture of neck of right femur, initial encounter ([facility] [address] [S72.001A]  Hip fracture requiring operative repair, left, closed, initial encounter ([facility] [address] [S72.002A]  Procedure(s) (LRB):  HIP OPEN REDUCTION INTERNAL FIXATION FNS RIGHT (Right)  4 Days Post-Op  Inpatient: Payor: MEDICARE / Plan: MEDICARE PART A AND B / Product Type: *No Product type* /     ASSESSMENT:     REHAB RECOMMENDATIONS:   Recommendation to date pending progress:  Setting:  Short-term Rehab    Equipment:    To Be Determined     ASSESSMENT:  Ms. Reji Sánchez was sitting up in chair and ready to get back to bed. Family present. She stood with mod assist x 2 from chair and then ambulated 15' in room using rolling walker and min assist x 2. She requires verbal and manual cues for walker management, guidance, pushing thru Ues, and safety awareness. She returned supine in bed and was left with needs in reach. Good progress this afternoon with increased ambulation. Will continue with POC. SUBJECTIVE:   Ms. Reji Sánchez states, \"You want me to stand? \"     Social/Functional Lives With: Family, Daughter  Type of Home: House  Home Layout: Two level, Performs ADL's on one level  Home Access: Level entry  Home Equipment: Andreas Au, rolling, Wheelchair-manual  Has the patient had two or more falls in the past year or any fall with injury in the past year?: Yes  ADL Assistance: Needs assistance  Ambulation Assistance: Needs assistance  Transfer Assistance: Independent  OBJECTIVE:     PAIN: [facility] [place] / O2: Renee Stall / Suzzane Modest / DRAINS:   Pre Treatment:          Post Treatment: 0 Vitals        Oxygen None    RESTRICTIONS/PRECAUTIONS:  Restrictions/Precautions  Restrictions/Precautions: Fall Risk, Weight Bearing  Lower Extremity Weight Bearing Restrictions  Right Lower Extremity Weight Bearing: Weight Bearing As Tolerated  Restrictions/Precautions: Fall Risk, Weight Bearing     MOBILITY: I Mod I S SBA CGA Min Mod Max Total  NT x2 Comments:   Bed Mobility    Rolling [] [] [] [] [] [] [] [] [] [] []    Supine to Sit [] [] [] [] [] [] [] [] [] [] []    Scooting [] [] [] [x] [] [] [] [] [] [] []    Sit to Supine [] [] [] [] [] [x] [x] [] [] [] [x]    Transfers    Sit to Stand [] [] [] [] [] [] [x] [] [] [] [x] Cues for hand placement and forward lean   Bed to Chair [] [] [] [] [] [] [x] [] [] [] [x]    Stand to Sit [] [] [] [] [] [x] [x] [] [] [] [x] Cues for hand placement and letting R foot slide forward    [] [] [] [] [] [] [] [] [] [] []    I=Independent, Mod I=Modified Independent, S=Supervision, SBA=Standby Assistance, CGA=Contact Guard Assistance,   Min=Minimal Assistance, Mod=Moderate Assistance, Max=Maximal Assistance, Total=Total Assistance, NT=Not Tested    BALANCE: Good Fair+ Fair Fair- Poor NT Comments   Sitting Static [] [x] [] [] [] []    Sitting Dynamic [] [] [x] [] [] []              Standing Static [] [] [] [x] [] []    Standing Dynamic [] [] [] [x] [x] []      GAIT: I Mod I S SBA CGA Min Mod Max Total  NT x2 Comments:   Level of Assistance [] [] [] [] [] [x] [] [] [] [] [x]    Distance 15'     DME Rolling Walker    Gait Quality Antalgic, Decreased kylie , Decreased step clearance, and Decreased step length    Weightbearing Status      Stairs      I=Independent, Mod I=Modified Independent, S=Supervision, SBA=Standby Assistance, CGA=Contact Guard Assistance,   Min=Minimal Assistance, Mod=Moderate Assistance, Max=Maximal Assistance, Total=Total Assistance, NT=Not Tested    PLAN:   ACUTE PHYSICAL THERAPY GOALS:   (Developed with and agreed upon by patient and/or caregiver.)    (1.) Sheng Castro will move from supine to sit and sit to supine  with MINIMAL ASSIST within 7 treatment day(s). (2.) Everardo Padilla will transfer from bed to chair and chair to bed with STAND BY ASSIST using the least restrictive device within 7 treatment day(s). (3.) Everardo Padilla will ambulate with STAND BY ASSIST for 50 feet with the least restrictive device within 7 treatment day(s). (4.) Everardo Padilla will perform standing static and dynamic balance activities x 10 minutes with SUPERVISION to improve safety within 7 treatment day(s). (5.) Everardo Padilla will perform bilateral lower extremity exercises x 20 min for HEP with INDEPENDENCE to improve strength, endurance, and functional mobility within 7 treatment day(s). FREQUENCY AND DURATION: BID for duration of hospital stay or until stated goals are met, whichever comes first.    TREATMENT:   TREATMENT:   Co-Treatment PT/OT necessary due to patient's decreased overall endurance/tolerance levels, as well as need for high level skilled assistance to complete functional transfers/mobility and functional tasks  Therapeutic Activity (10 Minutes): Therapeutic activity included Rolling, Sit to Supine, Scooting, Ambulation on level ground, Sitting balance , and Standing balance to improve functional Activity tolerance, Balance, Coordination, Mobility, and Strength.     TREATMENT GRID:   Date:  8/24/22 Date:   Date:     Activity/Exercise Parameters Parameters Parameters   Supine AP 10x B     Supine heel slides 10x B AAR     Supine SAQ 10x B     Supine hip abdd 10x B AAR                           AFTER TREATMENT PRECAUTIONS: Bed, Bed/Chair Locked, Call light within reach, Needs within reach, RN notified, and Visitors at bedside    INTERDISCIPLINARY COLLABORATION:  RN/ PCT, PT/ PTA, and OT/ GARCIA    EDUCATION:      TIME IN/OUT:  Time In: 1308  Time Out: 1318  Minutes: 10    ALEXANDRA SOMMER PTA

## 2022-08-25 NOTE — PROGRESS NOTES
caregiver.)    (1.) Steve Agarwal  will move from supine to sit and sit to supine  with MINIMAL ASSIST within 7 treatment day(s). (2.) Steve Orf will transfer from bed to chair and chair to bed with STAND BY ASSIST using the least restrictive device within 7 treatment day(s). (3.) Steve Orf will ambulate with STAND BY ASSIST for 50 feet with the least restrictive device within 7 treatment day(s). (4.) Steve Orf will perform standing static and dynamic balance activities x 10 minutes with SUPERVISION to improve safety within 7 treatment day(s). (5.) Steve Orf will perform bilateral lower extremity exercises x 20 min for HEP with INDEPENDENCE to improve strength, endurance, and functional mobility within 7 treatment day(s). FREQUENCY AND DURATION: BID for duration of hospital stay or until stated goals are met, whichever comes first.    TREATMENT:   TREATMENT:   Co-Treatment PT/OT necessary due to patient's decreased overall endurance/tolerance levels, as well as need for high level skilled assistance to complete functional transfers/mobility and functional tasks  Therapeutic Activity (30 Minutes): Therapeutic activity included Rolling, Supine to Sit, Scooting, Transfer Training, Ambulation on level ground, Sitting balance , and Standing balance to improve functional Activity tolerance, Balance, Coordination, Mobility, and Strength.     TREATMENT GRID:   Date:  8/24/22 Date:   Date:     Activity/Exercise Parameters Parameters Parameters   Supine AP 10x B     Supine heel slides 10x B AAR     Supine SAQ 10x B     Supine hip abdd 10x B AAR                           AFTER TREATMENT PRECAUTIONS: Bed/Chair Locked, Call light within reach, Chair, Needs within reach, RN notified, and Visitors at bedside    INTERDISCIPLINARY COLLABORATION:  RN/ PCT, PT/ PTA, and OT/ GARCIA    EDUCATION:      TIME IN/OUT:  Time In: 1005  Time Out: 1035  Minutes: 30    ALEXANDRA SOMMER PTA

## 2022-08-25 NOTE — PROGRESS NOTES
Independent    OBJECTIVE:     Juan Antonio Hamming / Minetta Gume / AIRWAY: NA    RESTRICTIONS/PRECAUTIONS:  Restrictions/Precautions  Restrictions/Precautions: Fall Risk, Weight Bearing  Lower Extremity Weight Bearing Restrictions  Right Lower Extremity Weight Bearing: Weight Bearing As Tolerated        PAIN: VITALS / O2:   Pre Treatment:              Post Treatment: 0 Vitals          Oxygen            MOBILITY: I Mod I S SBA CGA Min Mod Max Total  NT x2 Comments:   Bed Mobility    Rolling [] [] [] [] [] [] [] [] [] [x] []    Supine to Sit [] [] [] [] [] [] [x] [] [] [] [x]    Scooting [] [] [] [] [] [x] [x] [] [] [] []    Sit to Supine [] [] [] [] [] [] [] [] [] [x] []    Transfers    Sit to Stand [] [] [] [] [] [] [x] [] [] [] [x]    Bed to Chair [] [] [] [] [] [x] [x] [] [] [] []    Stand to Sit [] [] [] [] [] [x] [] [] [] [] [x]    Tub/Shower [] [] [] [] [] [] [] [] [] [x] []     Toilet [] [] [] [] [] [x] [] [] [] [] [x]      [] [] [] [] [] [] [] [] [] [] []    I=Independent, Mod I=Modified Independent, S=Supervision/Setup, SBA=Standby Assistance, CGA=Contact Guard Assistance, Min=Minimal Assistance, Mod=Moderate Assistance, Max=Maximal Assistance, Total=Total Assistance, NT=Not Tested    ACTIVITIES OF DAILY LIVING: I Mod I S SBA CGA Min Mod Max Total NT Comments   BASIC ADLs:              Upper Body   Bathing [] [] [] [] [] [] [] [] [] [x]    Lower Body Bathing [] [] [] [] [] [] [] [] [] [x]    Toileting [] [] [] [] [] [x] [] [] [] []    Upper Body Dressing [] [] [] [] [] [] [] [] [] [x]    Lower Body Dressing [] [] [] [] [] [] [] [] [] [x]    Feeding [] [] [] [] [] [] [] [] [] [x]    Grooming [] [] [] [] [] [] [] [] [] [x]    Personal Device Care [] [] [] [] [] [] [] [] [] [x]    Functional Mobility [] [] [] [] [] [x] [x] [] [] [] x2   I=Independent, Mod I=Modified Independent, S=Supervision/Setup, SBA=Standby Assistance, CGA=Contact Guard Assistance, Min=Minimal Assistance, Mod=Moderate Assistance, Max=Maximal Assistance, Total=Total Assistance, NT=Not Tested    BALANCE: Good Fair+ Fair Fair- Poor NT Comments   Sitting Static [] [x] [] [] [] []    Sitting Dynamic [] [x] [] [] [] []              Standing Static [] [] [x] [] [] []    Standing Dynamic [] [] [x] [] [] []        PLAN:     FREQUENCY/DURATION   OT Plan of Care: 5 times/week for duration of hospital stay or until stated goals are met, whichever comes first.    ACUTE OCCUPATIONAL THERAPY GOALS:   (Developed with and agreed upon by patient and/or caregiver.)  1. Patient will complete lower body bathing and dressing with minimal assistance and adaptive equipment as needed. 2. Patient will complete toileting with minimal assistance. 3. Patient will tolerate 30 minutes of OT treatment with 2-3 rest breaks to increase activity tolerance for ADLs. 4. Patient will complete functional transfers with minimal assistance and adaptive equipment as needed. 5. Patient will complete functional mobility for household distances with minimal assistance and appropriate safety awareness. Timeframe: 7 visits     TREATMENT:     TREATMENT:   Neuromuscular Re-education (15 Minutes): Neuromuscular Re-education included Balance Training, Coordination training, Postural training, Sitting balance training, and Standing balance training to improve Balance, Coordination, and Functional Mobility. Self Care (15 minutes): Patient participated in toileting ADLs in supported sitting and standing with minimal manual and tactile cueing to increase independence and decrease assistance required. Patient also participated in functional mobility and functional transfer training to increase independence and decrease assistance required.      TREATMENT GRID:  N/A    AFTER TREATMENT PRECAUTIONS: Bed/Chair Locked, Call light within reach, Chair, Needs within reach, and Visitors at bedside    INTERDISCIPLINARY COLLABORATION:  RN/ PCT, PT/ PTA, and OT/ GARCIA    EDUCATION:       TOTAL TREATMENT DURATION AND TIME:  Time In: 1005  Time Out: [address]  Minutes: [address] Brandi Star

## 2022-08-25 NOTE — PROGRESS NOTES
Hospitalist Progress Note   Admit Date:  2022 12:12 AM   Name:  Delores Rodney   Age:  80 y.o. Sex:  female  :  1929   MRN:  [id number]   Room:  /    Presenting Complaint: Fall     Reason(s) for Admission: Fall from bed, initial encounter [W06. XXXA]  Closed fracture of neck of right femur, [facility] [S72.001A]  Hip fracture requiring operative repair, left, closed, initial encounter [facility]) [facility] Course & Interval History:     Delores Rodney is a 80 y.o. female with medical history of   Hypertension  hypothyroidism      who presented with right hip pain after a fall. Patient lives at home with her daughter. She was trying to get out of bed this morning. She slipped out of bed and fell on the floor. She did not hit her head. She was awake and alert the whole time. She landed on her right hip. She felt the pain and could not get up. She was brought to emergency room. She was found to have fracture of her right hip. Patient underwent right hip open reduction and internal fixation on 2022. No complications    MMZGGOESHD/47KC Events (22): No changes or complaints. Feeling well. No CP, SOB, fevers      Assessment & Plan:       Hip fracture requiring operative repair, left, closed, initial encounter [facility])  Plan:   -Can go to SNF tomorrow; facility won't take CXR to rule out TB so waiting for PPD       Essential hypertension  Plan:   -Controlled. Continue current management      Acquired hypothyroidism  Plan:   -Controlled. Continue current management    Diet:  ADULT DIET; Regular  DVT PPx: aspirin   Code status: Full Code    Hospital Problems:  Principal Problem:    Hip fracture requiring operative repair, left, closed, initial encounter [facility])  Active Problems:    Essential hypertension    Acquired hypothyroidism  Resolved Problems:    * No resolved hospital problems.  *      Objective:   Patient Vitals for the past 24 hrs: Temp Pulse Resp BP SpO2   08/25/22 0742 98.4 °F (36.9 °C) 67 18 (!) 165/76 95 %   08/25/22 0441 97.6 °F (36.4 °C) 66 16 (!) 159/77 92 %   08/24/22 2347 98.1 °F (36.7 °C) 66 18 (!) 149/88 94 %   08/24/22 1945 97.8 °F (36.6 °C) 69 18 134/73 96 %   08/24/22 1637 98.8 °F (37.1 °C) 73 18 (!) 190/71 95 %   08/24/22 1146 98.6 °F (37 °C) 75 18 (!) 145/64 95 %         Oxygen Therapy  SpO2: 95 %  Pulse via Oximetry: 63 beats per minute  Pulse Oximeter Device Mode: Intermittent  Pulse Oximeter Device Location: Right  O2 Device: None (Room air)  O2 Flow Rate (L/min): 3 L/min    There is no height or weight on file to calculate BMI. Intake/Output Summary (Last 24 hours) at 8/25/2022 1028  Last data filed at 8/24/2022 2347  Gross per 24 hour   Intake --   Output 550 ml   Net -550 ml           Physical Exam:     Blood pressure (!) 165/76, pulse 67, temperature 98.4 °F (36.9 °C), temperature source Oral, resp. rate 18, SpO2 95 %. General:          Well nourished. Head:               Normocephalic, atraumatic  Eyes:               Sclerae appear normal.  Pupils equally round. ENT:                Nares appear normal, no drainage. Moist oral mucosa  Neck:               No restricted ROM. Trachea midline   CV:                  RRR. No jugular venous distension. Lungs:             Symmetric expansion. Abdomen:        nondistended. Extremities:     right upper thigh on the lateral aspect with dressing   Skin:                No rashes and normal coloration. Warm and dry. Neuro:             CN II-XII grossly intact. Sensation intact. Psych:             Normal mood and affect.          I have personally reviewed labs and tests showing:  Recent Labs:  Recent Results (from the past 48 hour(s))   COVID-19    Collection Time: 08/23/22  5:43 PM    Specimen: Nasopharyngeal Swab   Result Value Ref Range    SARS-CoV-2 Please find results under separate order     COVID-19, Rapid    Collection Time: 08/23/22  5:43 PM Specimen: Nasopharyngeal   Result Value Ref Range    Source NASAL      SARS-CoV-2, Rapid Not detected NOTD     PLEASE READ & DOCUMENT PPD TEST IN 24 HRS    Collection Time: 08/23/22  6:20 PM   Result Value Ref Range    PPD, (POC) Negative Negative    mm Induration 0 0 - 5 mm       I have personally reviewed imaging studies showing: Other Studies:  XR HIP RIGHT (1 VIEW)   Final Result   Typical postsurgical changes. XR HIP RIGHT (2-3 VIEWS)   Final Result   Postsurgical change as described. NC XR TECHNOLOGIST SERVICE   Final Result      CT HIP RIGHT WO CONTRAST   Final Result   There appears to be fracture in the anterolateral head of the right femur. The   age of this fracture is unclear as there is no associated fluid or soft tissue   edema. Please correlate clinically. XR HIP 2-3 VW W PELVIS RIGHT   Final Result   Findings as above.              Current Meds:  Current Facility-Administered Medications   Medication Dose Route Frequency    metoprolol succinate (TOPROL XL) extended release tablet 50 mg  50 mg Oral Daily    amLODIPine (NORVASC) tablet 5 mg  5 mg Oral Daily    FLUoxetine (PROZAC) capsule 40 mg  40 mg Oral Daily    sodium chloride flush 0.9 % injection 5-40 mL  5-40 mL IntraVENous 2 times per day    sodium chloride flush 0.9 % injection 5-40 mL  5-40 mL IntraVENous PRN    0.9 % sodium chloride infusion   IntraVENous PRN    ondansetron (ZOFRAN-ODT) disintegrating tablet 4 mg  4 mg Oral Q8H PRN    Or    ondansetron (ZOFRAN) injection 4 mg  4 mg IntraVENous Q6H PRN    polyethylene glycol (GLYCOLAX) packet 17 g  17 g Oral Daily PRN    acetaminophen (TYLENOL) tablet 650 mg  650 mg Oral Q6H PRN    Or    acetaminophen (TYLENOL) suppository 650 mg  650 mg Rectal Q6H PRN    sodium chloride flush 0.9 % injection 5-40 mL  5-40 mL IntraVENous 2 times per day    sodium chloride flush 0.9 % injection 5-40 mL  5-40 mL IntraVENous PRN    0.9 % sodium chloride infusion   IntraVENous PRN    aspirin EC tablet 325 mg  325 mg Oral BID    traZODone (DESYREL) tablet 100 mg  100 mg Oral Nightly       Signed:  Leah Valencia MD    Part of this note may have been written by using a voice dictation software. The note has been proof read but may still contain some grammatical/other typographical errors.

## 2022-08-26 PROBLEM — K59.00 CONSTIPATION: Chronic | Status: ACTIVE | Noted: 2020-05-20

## 2022-08-26 PROBLEM — N18.30 CKD (CHRONIC KIDNEY DISEASE) STAGE 3, GFR 30-59 ML/MIN (HCC): Chronic | Status: ACTIVE | Noted: 2020-05-20

## 2022-08-26 PROBLEM — F03.90 DEMENTIA (HCC): Chronic | Status: ACTIVE | Noted: 2020-05-20

## 2022-08-26 PROBLEM — I10 ESSENTIAL HYPERTENSION: Chronic | Status: ACTIVE | Noted: 2020-05-20

## 2022-08-26 PROBLEM — F32.A DEPRESSION: Chronic | Status: ACTIVE | Noted: 2020-05-20

## 2022-08-26 PROBLEM — E03.9 ACQUIRED HYPOTHYROIDISM: Chronic | Status: ACTIVE | Noted: 2020-05-20

## 2022-08-26 PROCEDURE — 97112 NEUROMUSCULAR REEDUCATION: CPT

## 2022-08-26 PROCEDURE — 97535 SELF CARE MNGMENT TRAINING: CPT

## 2022-08-26 PROCEDURE — 97530 THERAPEUTIC ACTIVITIES: CPT

## 2022-08-26 PROCEDURE — 6370000000 HC RX 637 (ALT 250 FOR IP): Performed by: INTERNAL MEDICINE

## 2022-08-26 PROCEDURE — 2580000003 HC RX 258: Performed by: INTERNAL MEDICINE

## 2022-08-26 PROCEDURE — 97110 THERAPEUTIC EXERCISES: CPT

## 2022-08-26 PROCEDURE — 6370000000 HC RX 637 (ALT 250 FOR IP): Performed by: HOSPITALIST

## 2022-08-26 PROCEDURE — 2580000003 HC RX 258: Performed by: ORTHOPAEDIC SURGERY

## 2022-08-26 PROCEDURE — 1100000000 HC RM PRIVATE

## 2022-08-26 PROCEDURE — 6370000000 HC RX 637 (ALT 250 FOR IP): Performed by: ORTHOPAEDIC SURGERY

## 2022-08-26 RX ORDER — TRAMADOL HYDROCHLORIDE 50 MG/1
25 TABLET ORAL EVERY 6 HOURS PRN
Qty: 6 TABLET | Refills: 0 | Status: SHIPPED | OUTPATIENT
Start: 2022-08-26 | End: 2022-08-29

## 2022-08-26 RX ADMIN — ASPIRIN 325 MG: 325 TABLET, COATED ORAL at 08:54

## 2022-08-26 RX ADMIN — ACETAMINOPHEN 650 MG: 325 TABLET ORAL at 21:42

## 2022-08-26 RX ADMIN — ACETAMINOPHEN 650 MG: 325 TABLET ORAL at 09:01

## 2022-08-26 RX ADMIN — SODIUM CHLORIDE, PRESERVATIVE FREE 5 ML: 5 INJECTION INTRAVENOUS at 21:42

## 2022-08-26 RX ADMIN — FLUOXETINE HYDROCHLORIDE 40 MG: 20 CAPSULE ORAL at 09:01

## 2022-08-26 RX ADMIN — TRAZODONE HYDROCHLORIDE 100 MG: 50 TABLET ORAL at 21:42

## 2022-08-26 RX ADMIN — AMLODIPINE BESYLATE 5 MG: 5 TABLET ORAL at 08:54

## 2022-08-26 RX ADMIN — SODIUM CHLORIDE, PRESERVATIVE FREE 10 ML: 5 INJECTION INTRAVENOUS at 08:55

## 2022-08-26 RX ADMIN — TRAMADOL HYDROCHLORIDE 25 MG: 50 TABLET ORAL at 09:01

## 2022-08-26 RX ADMIN — ASPIRIN 325 MG: 325 TABLET, COATED ORAL at 21:42

## 2022-08-26 RX ADMIN — METOPROLOL SUCCINATE 50 MG: 50 TABLET, EXTENDED RELEASE ORAL at 08:54

## 2022-08-26 ASSESSMENT — PAIN SCALES - GENERAL
PAINLEVEL_OUTOF10: 5
PAINLEVEL_OUTOF10: 0
PAINLEVEL_OUTOF10: 3
PAINLEVEL_OUTOF10: 0

## 2022-08-26 ASSESSMENT — PAIN DESCRIPTION - LOCATION
LOCATION: HIP
LOCATION: LEG

## 2022-08-26 ASSESSMENT — PAIN DESCRIPTION - PAIN TYPE: TYPE: SURGICAL PAIN

## 2022-08-26 ASSESSMENT — PAIN DESCRIPTION - DESCRIPTORS
DESCRIPTORS: ACHING
DESCRIPTORS: SORE

## 2022-08-26 ASSESSMENT — PAIN DESCRIPTION - FREQUENCY: FREQUENCY: INTERMITTENT

## 2022-08-26 ASSESSMENT — PAIN - FUNCTIONAL ASSESSMENT: PAIN_FUNCTIONAL_ASSESSMENT: PREVENTS OR INTERFERES SOME ACTIVE ACTIVITIES AND ADLS

## 2022-08-26 ASSESSMENT — PAIN DESCRIPTION - ONSET: ONSET: GRADUAL

## 2022-08-26 ASSESSMENT — PAIN DESCRIPTION - ORIENTATION
ORIENTATION: LEFT
ORIENTATION: LEFT

## 2022-08-26 NOTE — PROGRESS NOTES
PHYSICAL THERAPY: Daily Note PM   (Link to Caseload Tracking: PT Visit Days : 4  Time In/Out PT Charge Capture  Rehab Caseload Tracker  Orders    Montana Medellin is a 80 y.o. female   PRIMARY DIAGNOSIS: Hip fracture requiring operative repair, left, closed, initial encounter ([facility]  Fall from bed, initial encounter [W06. XXXA]  Closed fracture of neck of right femur, initial encounter ([facility] [S72.001A]  Hip fracture requiring operative repair, left, closed, initial encounter ([facility] [S72.002A]  Procedure(s) (LRB):  HIP OPEN REDUCTION INTERNAL FIXATION FNS RIGHT (Right)  5 Days Post-Op  Inpatient: Payor: MEDICARE / Plan: MEDICARE PART A AND B / Product Type: *No Product type* /     ASSESSMENT:     REHAB RECOMMENDATIONS:   Recommendation to date pending progress:  Setting:  Short-term Rehab    Equipment:    To Be Determined     ASSESSMENT:  Ms. Ilya Jaimes was sitting up in chair and ready to get back to bed. She stood and was able to increase her ambulation to 15' in room using rolling walker and min assist.  She does require cues for pushing thru arms, staying closer to walker, walker management and safety awareness. She returned supine and worked on rolling L<>R with min/mod assist.  She was left with needs in reach. Good progress towards goals. Will continue with POC.        SUBJECTIVE:   Ms. Ilya Jaimes states, \"Oh I can do it\"     Social/Functional Lives With: Family, Daughter  Type of Home: House  Home Layout: Two level, Performs ADL's on one level  Home Access: Level entry  Home Equipment: Precilla Lee, rolling, Wheelchair-manual  Has the patient had two or more falls in the past year or any fall with injury in the past year?: Yes  ADL Assistance: Needs assistance  Ambulation Assistance: Needs assistance  Transfer Assistance: Independent  OBJECTIVE:     PAIN: Jolyne Trenton / O2: PRECAUTION / [facility] / DRAINS:   Pre Treatment:   Pain Assessment: None - Denies Pain  Pain Level: 0      Post Treatment: 2 Vitals        Oxygen None    RESTRICTIONS/PRECAUTIONS:  Restrictions/Precautions  Restrictions/Precautions: Fall Risk, Weight Bearing  Lower Extremity Weight Bearing Restrictions  Right Lower Extremity Weight Bearing: Weight Bearing As Tolerated  Restrictions/Precautions: Fall Risk, Weight Bearing     MOBILITY: I Mod I S SBA CGA Min Mod Max Total  NT x2 Comments:   Bed Mobility    Rolling [] [] [] [] [] [] [] [] [] [] []    Supine to Sit [] [] [] [] [] [] [] [] [] [] []    Scooting [] [] [] [] [x] [] [] [] [] [] []    Sit to Supine [] [] [] [] [] [x] [] [] [] [] []    Transfers    Sit to Stand [] [] [] [] [] [x] [] [] [] [] [x] Cues for hand placement and forward lean   Bed to Chair [] [] [] [] [] [] [x] [] [] [] [x]    Stand to Sit [] [] [] [] [] [x] [] [] [] [] [x] Cues for hand placement and letting R foot slide forward    [] [] [] [] [] [] [] [] [] [] []    I=Independent, Mod I=Modified Independent, S=Supervision, SBA=Standby Assistance, CGA=Contact Guard Assistance,   Min=Minimal Assistance, Mod=Moderate Assistance, Max=Maximal Assistance, Total=Total Assistance, NT=Not Tested    BALANCE: Good Fair+ Fair Fair- Poor NT Comments   Sitting Static [] [x] [] [] [] []    Sitting Dynamic [] [] [x] [] [] []              Standing Static [] [] [] [x] [] []    Standing Dynamic [] [] [] [x] [x] []      GAIT: I Mod I S SBA CGA Min Mod Max Total  NT x2 Comments:   Level of Assistance [] [] [] [] [] [x] [] [] [] [] []    Distance 15 feet    DME Rolling Walker    Gait Quality Antalgic, Decreased kylie , Decreased step clearance, and Decreased step length    Weightbearing Status Right Lower Extremity Weight Bearing: Weight Bearing As Tolerated    Stairs      I=Independent, Mod I=Modified Independent, S=Supervision, SBA=Standby Assistance, CGA=Contact Guard Assistance,   Min=Minimal Assistance, Mod=Moderate Assistance, Max=Maximal Assistance, Total=Total Assistance, NT=Not Tested    PLAN:   ACUTE PHYSICAL THERAPY GOALS:   (Developed with and agreed upon by patient and/or caregiver.)    (1.) Elvin Bloom  will move from supine to sit and sit to supine  with MINIMAL ASSIST within 7 treatment day(s). (2.) Elvin Bloom will transfer from bed to chair and chair to bed with STAND BY ASSIST using the least restrictive device within 7 treatment day(s). (3.) Elvin Bloom will ambulate with STAND BY ASSIST for 50 feet with the least restrictive device within 7 treatment day(s). (4.) Elvin Bloom will perform standing static and dynamic balance activities x 10 minutes with SUPERVISION to improve safety within 7 treatment day(s). (5.) Elvin Bloom will perform bilateral lower extremity exercises x 20 min for HEP with INDEPENDENCE to improve strength, endurance, and functional mobility within 7 treatment day(s). FREQUENCY AND DURATION: BID for duration of hospital stay or until stated goals are met, whichever comes first.    TREATMENT:   TREATMENT:   Therapeutic Activity (25 Minutes): Therapeutic activity included Rolling, Sit to Supine, Scooting, Ambulation on level ground, Sitting balance , and Standing balance to improve functional Activity tolerance, Balance, Coordination, Mobility, and Strength.     TREATMENT GRID:   Date:  8/24/22 Date:  8/26/22 Date:     Activity/Exercise Parameters Parameters Parameters   Supine AP 10x B X 10 B    Supine heel slides 10x B AAR     Supine SAQ 10x B     Supine hip abdd 10x B AAR 10x B AAR    LAQ  X 10 B    Hip flexion  X 10 B              AFTER TREATMENT PRECAUTIONS: Bed, Bed/Chair Locked, Call light within reach, Needs within reach, RN notified, and Visitors at bedside    INTERDISCIPLINARY COLLABORATION:  RN/ PCT and PT/ PTA    EDUCATION:      TIME IN/OUT:  Time In: 1435  Time Out: 1500  Minutes: 25    ALEXANDRA SOMMER PTA

## 2022-08-26 NOTE — CARE COORDINATION
Patient is up for discharge today - 48hr PPD has been read. CM reached out to [facility] with [facility] who confirmed that patient was able to discharge to facility today (she gave CM room [address], report [phone]). CM contacted [facility] to get pickup time for next available, 3:00. Fadi Anne, and patient and patient's daughter (who is at bedside, Reinaldo Post) of pickup time. Packet will completed and given to . 2:50 - BRIGIDA notified by [facility] that they ran the patient's insurance and patient has [employer] versus traditional Medicare. [facility] not [facility] [employer] CM contacted [facility] and cancelled discharge. MD and RN notified. BRIGIDA spoke with Jordi Romero from [place] [facility] [address] who stated that she has a bed available and can start auth - if auth approved, patient will be able to discharge to facility this weekend. Family confirmed that they are okay with patient going to [facility] [address]. Jordi Romero has started [place] - currently pending.

## 2022-08-26 NOTE — DISCHARGE SUMMARY
Hospitalist Discharge Summary   Admit Date:  2022 12:12 AM   DC Note date: 2022  Name:  Bull Potter   Age:  80 y.o. Sex:  female  :  1929   MRN:  [id number]   Room:  [facility]  PCP:  Rosangela Davis MD    Presenting Complaint: Fall     Initial Admission Diagnosis: Fall from bed, initial encounter [W06. XXXA]  Closed fracture [facility] neck of right femur, [facility] [S72.001A]  Hip fracture requiring operative repair, left, closed, initial encounter ([facility] [S72.002A]     Problem List for this Hospitalization (present on admission):    Principal Problem:    Hip fracture requiring operative repair, left, closed, initial encounter [facility])  Active Problems:    Pneumonia due to COVID-19 virus    Essential hypertension    Acquired hypothyroidism    CKD (chronic kidney disease) stage 3, GFR 30-59 ml/min ([facility])    Dementia ([facility]    Constipation  Resolved Problems:    * No resolved hospital problems. *      Hospital Course:  Bull Potter is a 80 y.o. female with medical history of   Hypertension  hypothyroidism      who presented with right hip pain after a fall. Patient lives at home with her daughter. She was trying to get out of bed this morning. She slipped out of bed and fell on the floor. She did not hit her head. She was awake and alert the whole time. She landed on her right hip. She felt the pain and could not get up. She was brought to emergency room. She was found to have fracture of her right hip. Patient underwent right hip open reduction and internal fixation on 2022. No complications    She has been stable awaiting rehab placement. Discharge delayed by insurance and facility. required PPD; would not accept CXR showing no evidence of active TB. . Can go today; had insurance mixup as well; both of these nonmedical issues delayed discharge    She had a fever yesterday but she denies symptoms.    says she feels well; wasn't aware of fever  No complaints. Pt denies coughing but daughter reports occasional coughing. No SOB,  CP, urinary symptoms. CXR showed possible early infiltrate LLL so I will give augmentin here to prevent developing postop pneumonia. On room air during this stay. Pt/family deny choking on food etc; may benefit from SLP eval at SNF. Addendum:  Patient tested + for COVID. She is not in any distress but this could explain fever and CXR finding. Will let her continue on the abx anyway because + test does not exclude bacterial PNA. Disposition: SNF  Diet: ADULT DIET; Regular  Code Status: Full Code    Follow Ups:   Contact information for follow-up providers     Alissa Tsai MD. Go to. Specialty: Internal Medicine  Why: when discharged from rehab  Contact information:  [address] [facility] Internal [facility] an  [place] [address]  [phone]                   Contact information for after-discharge care     [facility] [address]) . Service: Skilled Nursing  Contact information:  [address] [place] [address] [phone]                           Time spent in patient discharge and coordination 35 minutes. Follow up labs/diagnostics (ultimately defer to outpatient provider):  CBC, BMP    Plan was discussed with pt, family. All questions answered. Patient was stable at time of discharge. Instructions given to call a physician or return if any concerns. Current Discharge Medication List        START taking these medications    Details   amoxicillin-clavulanate (AUGMENTIN) 875-125 MG per tablet Take 1 tablet by mouth 2 times daily for 7 days  Qty: 14 tablet, Refills: 0      aspirin 325 MG EC tablet Take 1 tablet by mouth daily for 21 days  Qty: 21 tablet, Refills: 0      traMADol (ULTRAM) 50 MG tablet Take 0.5 tablets by mouth every 6 hours as needed for Pain for up to 3 days.   Qty: 6 tablet, Refills: 0    Comments: Reduce doses taken as pain becomes manageable  Associated Diagnoses: Closed fracture of neck of right femur, initial encounter ([facility]           CONTINUE these medications which have NOT CHANGED    Details   FLUoxetine (PROZAC) 10 MG capsule Take 40 mg by mouth daily      metoprolol succinate (TOPROL XL) 50 MG extended release tablet Take 50 mg by mouth daily      potassium chloride (KLOR-CON) 20 MEQ packet Take 20 mEq by mouth daily      traZODone (DESYREL) 100 MG tablet Take 100 mg by mouth nightly      amLODIPine (NORVASC) 5 MG tablet Take 5 mg by mouth daily             Procedures done this admission:  Procedure(s):  HIP OPEN REDUCTION INTERNAL FIXATION FNS RIGHT    Consults this admission:  FOLLOW UP VISIT    Echocardiogram results:  No results found for this or any previous visit. Diagnostic Imaging/Tests:   XR HIP RIGHT (1 VIEW)    Result Date: 8/21/2022  Typical postsurgical changes. XR HIP RIGHT (2-3 VIEWS)    Result Date: 8/21/2022  Postsurgical change as described. CT HIP RIGHT WO CONTRAST    Result Date: 8/21/2022  There appears to be fracture in the anterolateral head of the right femur. The age of this fracture is unclear as there is no associated fluid or soft tissue edema. Please correlate clinically. XR HIP 2-3 VW W PELVIS RIGHT    Result Date: 8/21/2022  Findings as above. No results for input(s): CULTURE in the last 720 hours.     Labs: Results:       BMP, Mg, Phos Recent Labs     08/28/22  0842   *   K 3.8      CO2 28   ANIONGAP 1*   BUN 18   CREATININE 1.00   LABGLOM 55*   GFRAA >60   CALCIUM 8.5   GLUCOSE 113*      CBC Recent Labs     08/28/22  0842   WBC 7.8   RBC 4.02*   HGB 11.1*   HCT 34.7*   MCV 86.3   MCH 27.6   MCHC 32.0   RDW 14.2      MPV 10.2   NRBC 0.00   SEGS 71   LYMPHOPCT 16   EOSRELPCT 1   MONOPCT 10   BASOPCT 1   IMMGRAN 1   SEGSABS 5.6   LYMPHSABS 1.2   EOSABS 0.1   MONOSABS 0.8   BASOSABS 0.0   ABSIMMGRAN 0.0      LFT No results for input(s): BILITOT, BILIDIR, ALKPHOS, AST, ALT, PROT, LABALBU, GLOB in the last 72 hours.    Cardiac  No results found for: NTPROBNP, TROPHS   Coags Lab Results   Component Value Date/Time    PROTIME 13.2 05/20/2020 05:13 PM    INR 1.0 05/20/2020 05:13 PM      A1c No results found for: LABA1C, EAG   Lipids No results found for: CHOL, LDLCALC, LABVLDL, HDL, CHOLHDLRATIO, TRIG   Thyroid  No results found for: Bertha Torres     Most Recent UA Lab Results   Component Value Date/Time    COLORU CASA 06/18/2019 12:19 PM    SPECGRAV 1.022 06/18/2019 12:19 PM    PROTEINU TRACE 06/18/2019 12:19 PM    GLUCOSEU NEGATIVE  06/18/2019 12:19 PM    KETUA 15 06/18/2019 12:19 PM    BILIRUBINUR SMALL 06/18/2019 12:19 PM    BLOODU NEGATIVE  06/18/2019 12:19 PM    NITRU NEGATIVE  06/18/2019 12:19 PM    LEUKOCYTESUR MODERATE 06/18/2019 12:19 PM    WBCUA 10-20 06/18/2019 12:19 PM    RBCUA 0-3 06/18/2019 12:19 PM    BACTERIA TRACE 06/18/2019 12:19 PM          All Labs from Last 24 Hrs:  Recent Results (from the past 24 hour(s))   CBC with Auto Differential    Collection Time: 08/28/22  8:42 AM   Result Value Ref Range    WBC 7.8 4.3 - 11.1 K/uL    RBC 4.02 (L) 4.05 - 5.2 M/uL    Hemoglobin 11.1 (L) 11.7 - 15.4 g/dL    Hematocrit 34.7 (L) 35.8 - 46.3 %    MCV 86.3 79.6 - 97.8 FL    MCH 27.6 26.1 - 32.9 PG    MCHC 32.0 31.4 - 35.0 g/dL    RDW 14.2 11.9 - 14.6 %    Platelets 842 301 - 305 K/uL    MPV 10.2 9.4 - 12.3 FL    nRBC 0.00 0.0 - 0.2 K/uL    Differential Type AUTOMATED      Seg Neutrophils 71 43 - 78 %    Lymphocytes 16 13 - 44 %    Monocytes 10 4.0 - 12.0 %    Eosinophils % 1 0.5 - 7.8 %    Basophils 1 0.0 - 2.0 %    Immature Granulocytes 1 0.0 - 5.0 %    Segs Absolute 5.6 1.7 - 8.2 K/UL    Absolute Lymph # 1.2 0.5 - 4.6 K/UL    Absolute Mono # 0.8 0.1 - 1.3 K/UL    Absolute Eos # 0.1 0.0 - 0.8 K/UL    Basophils Absolute 0.0 0.0 - 0.2 K/UL    Absolute Immature Granulocyte 0.0 0.0 - 0.5 K/UL   Basic Metabolic Panel w/ Reflex to MG    Collection Time: 08/28/22  8:42 AM   Result Value Ref Range    Sodium 135 (L) 136 - 145 mmol/L    Potassium 3.8 3.5 - 5.1 mmol/L    Chloride 106 98 - 107 mmol/L    CO2 28 21 - 32 mmol/L    Anion Gap 1 (L) 7 - 16 mmol/L    Glucose 113 (H) 65 - 100 mg/dL    BUN 18 8 - 23 MG/DL    Creatinine 1.00 0.6 - 1.0 MG/DL    GFR African American >60 >60 ml/min/1.73m2    GFR Non- 55 (L) >60 ml/min/1.73m2    Calcium 8.5 8.3 - 10.4 MG/DL   COVID-19, Rapid    Collection Time: 08/28/22 12:24 PM    Specimen: Nasopharyngeal   Result Value Ref Range    Source NASAL      SARS-CoV-2, Rapid Detected (AA) NOTD         No Known Allergies  Immunization History   Administered Date(s) Administered    PPD Test 05/20/2020, 08/23/2022       Recent Vital Data:  Patient Vitals for the past 24 hrs:   Temp Pulse Resp BP SpO2   08/28/22 1110 98.2 °F (36.8 °C) 65 16 105/60 92 %   08/28/22 0745 98.1 °F (36.7 °C) 67 17 131/68 95 %   08/28/22 0341 98.4 °F (36.9 °C) 72 16 (!) 145/77 95 %   08/27/22 2317 98.6 °F (37 °C) 70 18 138/80 96 %   08/27/22 2017 99.2 °F (37.3 °C) -- -- -- --   08/27/22 1931 (!) 102.6 °F (39.2 °C) 71 18 (!) 159/66 96 %   08/27/22 1610 98.2 °F (36.8 °C) 69 16 (!) 158/69 97 %       Oxygen Therapy  SpO2: 92 %  Pulse via Oximetry: 63 beats per minute  Pulse Oximeter Device Mode: Intermittent  Pulse Oximeter Device Location: Right  O2 Device: None (Room air)  O2 Flow Rate (L/min): 3 L/min    There is no height or weight on file to calculate BMI. Intake/Output Summary (Last 24 hours) at 8/28/2022 1306  Last data filed at 8/28/2022 0341  Gross per 24 hour   Intake 360 ml   Output 500 ml   Net -140 ml         Physical Exam:    General:    Well nourished. No overt distress  Head:  Normocephalic, atraumatic  Eyes:  Sclerae appear normal.  Pupils equally round. HENT:  Nares appear normal, no drainage. Moist mucous membranes  Neck:  No restricted ROM. Trachea midline  CV:   RRR. No JVD  Lungs:   CTAB.  Respirations even, unlabored  Abdomen:   nondistended. Extremities: Warm and dry. No cyanosis or clubbing. No edema. Skin:     No rashes. Normal coloration  Neuro:  CN II-XII grossly intact. Psych:  Normal mood and affect. Signed:  Julio Cesar Iglesias MD    Part of this note may have been written by using a voice dictation software. The note has been proof read but may still contain some grammatical/other typographical errors.

## 2022-08-26 NOTE — PROGRESS NOTES
Hospitalist Progress Note   Admit Date:  2022 12:12 AM   Name:  Manjeet Sanchez   Age:  80 y.o. Sex:  female  :  1929   MRN:  [id number]   Room:      Presenting Complaint: Fall     Reason(s) for Admission: Fall from bed, initial encounter [W06. XXXA]  Closed fracture of neck of right femur, initial encounter ([facility] [S72.001A]  Hip fracture requiring operative repair, left, closed, initial encounter [facility]) [facility] Course & Interval History:     Manjeet Sanchez is a 80 y.o. female with medical history of   Hypertension  hypothyroidism      who presented with right hip pain after a fall. Patient lives at home with her daughter. She was trying to get out of bed this morning. She slipped out of bed and fell on the floor. She did not hit her head. She was awake and alert the whole time. She landed on her right hip. She felt the pain and could not get up. She was brought to emergency room. She was found to have fracture of her right hip. Patient underwent right hip open reduction and internal fixation on 2022. No complications    [id number]/25XR Events (22): Same as yesterday. No changes or complaints. Feeling well. No CP, SOB      Assessment & Plan:       Hip fracture requiring operative repair, left, closed, initial encounter [facility])  Plan:   -Can go to SNF tomorrow; was set to go today but there was an insurance mixup on facility's end. Essential hypertension  Plan:   -Controlled. Continue current management      Acquired hypothyroidism  Plan:   -Controlled. Continue current management    Diet:  ADULT DIET;  Regular  DVT PPx: aspirin   Code status: Full Code    Hospital Problems:  Principal Problem:    Hip fracture requiring operative repair, left, closed, initial encounter [facility])  Active Problems:    Essential hypertension    Acquired hypothyroidism    CKD (chronic kidney disease) stage 3, GFR 30-59 ml/min ([facility])    Dementia ([facility] Constipation  Resolved Problems:    * No resolved hospital problems. *      Objective:   Patient Vitals for the past 24 hrs:   Temp Pulse Resp BP SpO2   08/26/22 1507 98.4 °F (36.9 °C) 62 16 (!) 147/72 99 %   08/26/22 1233 98.2 °F (36.8 °C) 62 14 (!) 141/59 96 %   08/26/22 0751 98.2 °F (36.8 °C) 61 16 (!) 155/70 97 %   08/26/22 0524 97.3 °F (36.3 °C) 58 18 110/67 95 %   08/25/22 2358 98.4 °F (36.9 °C) 65 20 138/65 94 %   08/25/22 2016 98.6 °F (37 °C) 64 18 (!) 122/56 97 %         Oxygen Therapy  SpO2: 99 %  Pulse via Oximetry: 63 beats per minute  Pulse Oximeter Device Mode: Intermittent  Pulse Oximeter Device Location: Right  O2 Device: None (Room air)  O2 Flow Rate (L/min): 3 L/min    There is no height or weight on file to calculate BMI. Intake/Output Summary (Last 24 hours) at 8/26/2022 1529  Last data filed at 8/26/2022 0524  Gross per 24 hour   Intake --   Output 900 ml   Net -900 ml           Physical Exam:     Blood pressure (!) 147/72, pulse 62, temperature 98.4 °F (36.9 °C), temperature source Oral, resp. rate 16, SpO2 99 %. General:          Well nourished. Head:               Normocephalic, atraumatic  Eyes:               Sclerae appear normal.  Pupils equally round. ENT:                Nares appear normal, no drainage. Moist oral mucosa  Neck:               No restricted ROM. Trachea midline   CV:                  RRR. No jugular venous distension. Lungs:             Symmetric expansion. Abdomen:        nondistended. Extremities:     right upper thigh on the lateral aspect with dressing   Skin:                No rashes and normal coloration. Warm and dry. Neuro:             CN II-XII grossly intact. Sensation intact. Psych:             Normal mood and affect. I have personally reviewed labs and tests showing:  Recent Labs:  No results found for this or any previous visit (from the past 48 hour(s)). I have personally reviewed imaging studies showing:   Other Studies:  XR HIP RIGHT (1 VIEW)   Final Result   Typical postsurgical changes. XR HIP RIGHT (2-3 VIEWS)   Final Result   Postsurgical change as described. NC XR TECHNOLOGIST SERVICE   Final Result      CT HIP RIGHT WO CONTRAST   Final Result   There appears to be fracture in the anterolateral head of the right femur. The   age of this fracture is unclear as there is no associated fluid or soft tissue   edema. Please correlate clinically. XR HIP 2-3 VW W PELVIS RIGHT   Final Result   Findings as above. Current Meds:  Current Facility-Administered Medications   Medication Dose Route Frequency    traMADol (ULTRAM) tablet 25 mg  25 mg Oral Q6H PRN    metoprolol succinate (TOPROL XL) extended release tablet 50 mg  50 mg Oral Daily    amLODIPine (NORVASC) tablet 5 mg  5 mg Oral Daily    FLUoxetine (PROZAC) capsule 40 mg  40 mg Oral Daily    sodium chloride flush 0.9 % injection 5-40 mL  5-40 mL IntraVENous 2 times per day    sodium chloride flush 0.9 % injection 5-40 mL  5-40 mL IntraVENous PRN    0.9 % sodium chloride infusion   IntraVENous PRN    ondansetron (ZOFRAN-ODT) disintegrating tablet 4 mg  4 mg Oral Q8H PRN    Or    ondansetron (ZOFRAN) injection 4 mg  4 mg IntraVENous Q6H PRN    polyethylene glycol (GLYCOLAX) packet 17 g  17 g Oral Daily PRN    acetaminophen (TYLENOL) tablet 650 mg  650 mg Oral Q6H PRN    Or    acetaminophen (TYLENOL) suppository 650 mg  650 mg Rectal Q6H PRN    sodium chloride flush 0.9 % injection 5-40 mL  5-40 mL IntraVENous 2 times per day    sodium chloride flush 0.9 % injection 5-40 mL  5-40 mL IntraVENous PRN    0.9 % sodium chloride infusion   IntraVENous PRN    aspirin EC tablet 325 mg  325 mg Oral BID    traZODone (DESYREL) tablet 100 mg  100 mg Oral Nightly       Signed:  Lorena Savage MD    Part of this note may have been written by using a voice dictation software.   The note has been proof read but may still contain some grammatical/other typographical errors.

## 2022-08-26 NOTE — PROGRESS NOTES
PHYSICAL THERAPY: Daily Note AM   (Link to Caseload Tracking: PT Visit Days : 4  Time In/Out PT Charge Capture  Rehab Caseload Tracker  Orders    Almaz Jay is a 80 y.o. female   PRIMARY DIAGNOSIS: Hip fracture requiring operative repair, left, closed, initial encounter ([facility]  Fall from bed, initial encounter [W06. XXXA]  Closed fracture of neck of right femur, initial encounter ([facility] [S72.001A]  Hip fracture requiring operative repair, left, closed, initial encounter ([facility] [S72.002A]  Procedure(s) (LRB):  HIP OPEN REDUCTION INTERNAL FIXATION FNS RIGHT (Right)  5 Days Post-Op  Inpatient: Payor: MEDICARE / Plan: MEDICARE PART A AND B / Product Type: *No Product type* /     ASSESSMENT:     REHAB RECOMMENDATIONS:   Recommendation to date pending progress:  Setting:  Short-term Rehab    Equipment:    To Be Determined     ASSESSMENT:  Ms. Lakesha Reilly was supine on contact, agreeable to therapy. Her daughter is present in room and very supportive. She performed supine to sit with CGA. She stood with min assist x 2 and ambulated about 8' to sink using rolling walker and min/mod assist x 2. She was hurting a little more today and seeming to have more difficultly with ambulation this morning. She performed below LE exercises and was left up in chair with needs in reach. Slow progress towards goals. Will continue with POC.        SUBJECTIVE:   Ms. Lakesha Reilly states, \"Oh I can do it\"     Social/Functional Lives With: Family, Daughter  Type of Home: House  Home Layout: Two level, Performs ADL's on one level  Home Access: Level entry  Home Equipment: Albino Hawks, rolling, Wheelchair-manual  Has the patient had two or more falls in the past year or any fall with injury in the past year?: Yes  ADL Assistance: Needs assistance  Ambulation Assistance: Needs assistance  Transfer Assistance: Independent  OBJECTIVE:     PAIN: VITALS / O2: PRECAUTION / Jackqueline Mitten / DRAINS:   Pre Treatment:   Pain Assessment: None - Denies Pain  Pain Level: 0      Post Treatment: 4 Vitals        Oxygen    None    RESTRICTIONS/PRECAUTIONS:  Restrictions/Precautions  Restrictions/Precautions: Fall Risk, Weight Bearing  Lower Extremity Weight Bearing Restrictions  Right Lower Extremity Weight Bearing: Weight Bearing As Tolerated  Restrictions/Precautions: Fall Risk, Weight Bearing     MOBILITY: I Mod I S SBA CGA Min Mod Max Total  NT x2 Comments:   Bed Mobility    Rolling [] [] [] [] [] [] [] [] [] [] []    Supine to Sit [] [] [] [] [x] [] [] [] [] [] []    Scooting [] [] [] [] [x] [] [] [] [] [] []    Sit to Supine [] [] [] [] [] [] [] [] [] [] []    Transfers    Sit to Stand [] [] [] [] [] [x] [] [] [] [] [x] Cues for hand placement and forward lean   Bed to Chair [] [] [] [] [] [] [x] [] [] [] [x]    Stand to Sit [] [] [] [] [] [x] [] [] [] [] [x] Cues for hand placement and letting R foot slide forward    [] [] [] [] [] [] [] [] [] [] []    I=Independent, Mod I=Modified Independent, S=Supervision, SBA=Standby Assistance, CGA=Contact Guard Assistance,   Min=Minimal Assistance, Mod=Moderate Assistance, Max=Maximal Assistance, Total=Total Assistance, NT=Not Tested    BALANCE: Good Fair+ Fair Fair- Poor NT Comments   Sitting Static [] [x] [] [] [] []    Sitting Dynamic [] [] [x] [] [] []              Standing Static [] [] [] [x] [] []    Standing Dynamic [] [] [] [x] [x] []      GAIT: I Mod I S SBA CGA Min Mod Max Total  NT x2 Comments:   Level of Assistance [] [] [] [] [] [x] [x] [] [] [] [x]    Distance 8 feet    DME Rolling Walker    Gait Quality Antalgic, Decreased kylie , Decreased step clearance, and Decreased step length    Weightbearing Status Right Lower Extremity Weight Bearing: Weight Bearing As Tolerated    Stairs      I=Independent, Mod I=Modified Independent, S=Supervision, SBA=Standby Assistance, CGA=Contact Guard Assistance,   Min=Minimal Assistance, Mod=Moderate Assistance, Max=Maximal Assistance, Total=Total Assistance, NT=Not Tested    PLAN:   ACUTE PHYSICAL THERAPY GOALS:   (Developed with and agreed upon by patient and/or caregiver.)    (1.) Chu Monge  will move from supine to sit and sit to supine  with MINIMAL ASSIST within 7 treatment day(s). (2.) Chu Monge will transfer from bed to chair and chair to bed with STAND BY ASSIST using the least restrictive device within 7 treatment day(s). (3.) Chu Monge will ambulate with STAND BY ASSIST for 50 feet with the least restrictive device within 7 treatment day(s). (4.) Chu Monge will perform standing static and dynamic balance activities x 10 minutes with SUPERVISION to improve safety within 7 treatment day(s). (5.) Chu Monge will perform bilateral lower extremity exercises x 20 min for HEP with INDEPENDENCE to improve strength, endurance, and functional mobility within 7 treatment day(s). FREQUENCY AND DURATION: BID for duration of hospital stay or until stated goals are met, whichever comes first.    TREATMENT:   TREATMENT:   Co-Treatment PT/OT necessary due to patient's decreased overall endurance/tolerance levels, as well as need for high level skilled assistance to complete functional transfers/mobility and functional tasks  Therapeutic Activity (15 Minutes): Therapeutic activity included Supine to Sit, Scooting, Ambulation on level ground, Sitting balance , and Standing balance to improve functional Activity tolerance, Balance, Coordination, Mobility, and Strength. Therapeutic Exercise (10 Minutes): Therapeutic exercises noted below to improve functional activity tolerance, AROM, and strength.      TREATMENT GRID:   Date:  8/24/22 Date:  8/26/22 Date:     Activity/Exercise Parameters Parameters Parameters   Supine AP 10x B X 10 B    Supine heel slides 10x B AAR     Supine SAQ 10x B     Supine hip abdd 10x B AAR 10x B AAR    LAQ  X 10 B    Hip flexion  X 10 B              AFTER TREATMENT PRECAUTIONS: Bed/Chair Locked, Call light within reach, Chair, Needs within reach, RN

## 2022-08-26 NOTE — PROGRESS NOTES
assistance  Ambulation Assistance: Needs assistance  Transfer Assistance: Independent    OBJECTIVE:     Sarkis Han / Devonte Fine / AIRWAY: NA    RESTRICTIONS/PRECAUTIONS:  Restrictions/Precautions  Restrictions/Precautions: Fall Risk, Weight Bearing  Lower Extremity Weight Bearing Restrictions  Right Lower Extremity Weight Bearing: Weight Bearing As Tolerated        PAIN: VITALS / O2:   Pre Treatment:              Post Treatment: 0 Vitals          Oxygen            MOBILITY: I Mod I S SBA CGA Min Mod Max Total  NT x2 Comments:   Bed Mobility    Rolling [] [] [] [] [] [] [] [] [] [x] []    Supine to Sit [] [] [] [x] [x] [] [] [] [] [] []    Scooting [] [] [] [x] [x] [] [] [] [] [] []    Sit to Supine [] [] [] [] [] [] [] [] [] [x] []    Transfers    Sit to Stand [] [] [] [] [] [x] [] [] [] [] [x]    Bed to Chair [] [] [] [] [] [x] [] [] [] [] []    Stand to Sit [] [] [] [] [] [x] [] [] [] [] [x]    Tub/Shower [] [] [] [] [] [] [] [] [] [x] []     Toilet [] [] [] [] [] [] [] [] [] [x] []      [] [] [] [] [] [] [] [] [] [] []    I=Independent, Mod I=Modified Independent, S=Supervision/Setup, SBA=Standby Assistance, CGA=Contact Guard Assistance, Min=Minimal Assistance, Mod=Moderate Assistance, Max=Maximal Assistance, Total=Total Assistance, NT=Not Tested    ACTIVITIES OF DAILY LIVING: I Mod I S SBA CGA Min Mod Max Total NT Comments   BASIC ADLs:              Upper Body   Bathing [] [] [] [] [] [] [] [] [] [x]    Lower Body Bathing [] [] [] [] [] [] [] [] [] [x]    Toileting [] [] [] [] [] [] [] [] [] [x]    Upper Body Dressing [] [] [] [] [] [] [] [] [] [x]    Lower Body Dressing [] [] [] [] [] [] [] [] [] [x]    Feeding [] [] [] [] [] [] [] [] [] [x]    Grooming [] [] [] [] [] [x] [] [] [] []    Personal Device Care [] [] [] [] [] [] [] [] [] [x]    Functional Mobility [] [] [] [] [] [x] [] [] [] [] x2   I=Independent, Mod I=Modified Independent, S=Supervision/Setup, SBA=Standby Assistance, CGA=Contact Guard Assistance, Min=Minimal Assistance, Mod=Moderate Assistance, Max=Maximal Assistance, Total=Total Assistance, NT=Not Tested    BALANCE: Good Fair+ Fair Fair- Poor NT Comments   Sitting Static [] [x] [] [] [] []    Sitting Dynamic [] [x] [] [] [] []              Standing Static [] [] [x] [] [] []    Standing Dynamic [] [] [x] [] [] []        PLAN:     FREQUENCY/DURATION   OT Plan of Care: 5 times/week for duration of hospital stay or until stated goals are met, whichever comes first.    ACUTE OCCUPATIONAL THERAPY GOALS:   (Developed with and agreed upon by patient and/or caregiver.)  1. Patient will complete lower body bathing and dressing with minimal assistance and adaptive equipment as needed. 2. Patient will complete toileting with minimal assistance. 3. Patient will tolerate 30 minutes of OT treatment with 2-3 rest breaks to increase activity tolerance for ADLs. 4. Patient will complete functional transfers with minimal assistance and adaptive equipment as needed. 5. Patient will complete functional mobility for household distances with minimal assistance and appropriate safety awareness. Timeframe: 7 visits     TREATMENT:     TREATMENT:   Neuromuscular Re-education (15 Minutes): Neuromuscular Re-education included Balance Training, Coordination training, Postural training, Sitting balance training, and Standing balance training to improve Balance, Coordination, and Functional Mobility. Self Care (15 minutes): Patient participated in grooming ADLs in supported sitting with minimal verbal and manual cueing to increase independence, decrease assistance required, and increase activity tolerance. Patient also participated in functional mobility and functional transfer training to increase independence, decrease assistance required, and increase safety awareness.      TREATMENT GRID:  N/A    AFTER TREATMENT PRECAUTIONS: Bed/Chair Locked, Call light within reach, Chair, Needs within reach, and Visitors at bedside    INTERDISCIPLINARY COLLABORATION:  RN/ PCT, PT/ PTA, and OT/ GARCIA    EDUCATION:       TOTAL TREATMENT DURATION AND TIME:  Time In: 0925  Time Out: [place] 30  Minutes: 25    NIKA Martinez

## 2022-08-27 PROCEDURE — 6370000000 HC RX 637 (ALT 250 FOR IP): Performed by: HOSPITALIST

## 2022-08-27 PROCEDURE — 1100000000 HC RM PRIVATE

## 2022-08-27 PROCEDURE — 6370000000 HC RX 637 (ALT 250 FOR IP): Performed by: INTERNAL MEDICINE

## 2022-08-27 PROCEDURE — 97535 SELF CARE MNGMENT TRAINING: CPT

## 2022-08-27 PROCEDURE — 6370000000 HC RX 637 (ALT 250 FOR IP): Performed by: ORTHOPAEDIC SURGERY

## 2022-08-27 PROCEDURE — 2580000003 HC RX 258: Performed by: ORTHOPAEDIC SURGERY

## 2022-08-27 PROCEDURE — 97110 THERAPEUTIC EXERCISES: CPT

## 2022-08-27 PROCEDURE — 2580000003 HC RX 258: Performed by: INTERNAL MEDICINE

## 2022-08-27 PROCEDURE — 97530 THERAPEUTIC ACTIVITIES: CPT

## 2022-08-27 RX ADMIN — SODIUM CHLORIDE, PRESERVATIVE FREE 10 ML: 5 INJECTION INTRAVENOUS at 09:59

## 2022-08-27 RX ADMIN — ASPIRIN 325 MG: 325 TABLET, COATED ORAL at 20:59

## 2022-08-27 RX ADMIN — AMLODIPINE BESYLATE 5 MG: 5 TABLET ORAL at 09:51

## 2022-08-27 RX ADMIN — ASPIRIN 325 MG: 325 TABLET, COATED ORAL at 09:50

## 2022-08-27 RX ADMIN — TRAZODONE HYDROCHLORIDE 100 MG: 50 TABLET ORAL at 20:59

## 2022-08-27 RX ADMIN — TRAMADOL HYDROCHLORIDE 25 MG: 50 TABLET ORAL at 09:51

## 2022-08-27 RX ADMIN — SODIUM CHLORIDE, PRESERVATIVE FREE 10 ML: 5 INJECTION INTRAVENOUS at 21:00

## 2022-08-27 RX ADMIN — ACETAMINOPHEN 650 MG: 325 TABLET ORAL at 09:50

## 2022-08-27 RX ADMIN — METOPROLOL SUCCINATE 50 MG: 50 TABLET, EXTENDED RELEASE ORAL at 09:52

## 2022-08-27 RX ADMIN — SODIUM CHLORIDE, PRESERVATIVE FREE 10 ML: 5 INJECTION INTRAVENOUS at 10:05

## 2022-08-27 RX ADMIN — FLUOXETINE HYDROCHLORIDE 40 MG: 20 CAPSULE ORAL at 09:50

## 2022-08-27 ASSESSMENT — PAIN - FUNCTIONAL ASSESSMENT: PAIN_FUNCTIONAL_ASSESSMENT: PREVENTS OR INTERFERES SOME ACTIVE ACTIVITIES AND ADLS

## 2022-08-27 ASSESSMENT — PAIN DESCRIPTION - DESCRIPTORS: DESCRIPTORS: ACHING

## 2022-08-27 ASSESSMENT — PAIN SCALES - GENERAL
PAINLEVEL_OUTOF10: 6
PAINLEVEL_OUTOF10: 0

## 2022-08-27 ASSESSMENT — PAIN DESCRIPTION - LOCATION: LOCATION: LEG

## 2022-08-27 ASSESSMENT — PAIN DESCRIPTION - ORIENTATION: ORIENTATION: LEFT

## 2022-08-27 NOTE — PROGRESS NOTES
PHYSICAL THERAPY: Daily Note AM   (Link to Caseload Tracking: PT Visit Days : 5  Time In/Out PT Charge Capture  Rehab Caseload Tracker  Orders    Manju Ochoa is a 80 y.o. female   PRIMARY DIAGNOSIS: Hip fracture requiring operative repair, left, closed, initial encounter ([facility]  Fall from bed, initial encounter [W06. XXXA]  Closed fracture of neck of right femur, initial encounter ([facility] [S72.001A]  Hip fracture requiring operative repair, left, closed, initial encounter ([facility] [S72.002A]  Procedure(s) (LRB):  HIP OPEN REDUCTION INTERNAL FIXATION FNS RIGHT (Right)  6 Days Post-Op  Inpatient: Payor: Jesi Boudreaux / Plan: Danyelle Giang / Product Type: *No Product type* /     ASSESSMENT:     REHAB RECOMMENDATIONS:   Recommendation to date pending progress:  Setting:  Short-term Rehab    Equipment:    To Be Determined     ASSESSMENT:  Ms. Dora Conti was supine on contact. She performed supine to sit with min assist.  She stood with mod assist x 2 and was able to ambulate 12' using rolling walker and min/mod assist x 2. She requires more assist initially and then less as she moves along. She does require cues for walker management, pushing thru Ues, staying closer to walker, and safety awareness. She sat in chair and performed below LE exercises. Cues given to initiate exercise and to show how to perform initially. She is the sweetest lady and has a very supportive family. She is [place]. Good progress towards goals. Will continue with POC. SUBJECTIVE:   Ms. Dora Conti states, \"You want me to get up? \"     Social/Functional Lives With: Family, Daughter  Type of Home: House  Home Layout: Two level, Performs ADL's on one level  Home Access: Level entry  Home Equipment: Elnoria Cesar, rolling, Wheelchair-manual  Has the patient had two or more falls in the past year or any fall with injury in the past year?: Yes  ADL Assistance: Needs assistance  Ambulation Assistance: Needs assistance  Transfer Assistance: Independent  OBJECTIVE:     PAIN: VITALS / O2: PRECAUTION / Morgan Sable / DRAINS:   Pre Treatment:          Post Treatment: 2 Vitals        Oxygen    None    RESTRICTIONS/PRECAUTIONS:  Restrictions/Precautions  Restrictions/Precautions: Fall Risk, Weight Bearing  Lower Extremity Weight Bearing Restrictions  Right Lower Extremity Weight Bearing: Weight Bearing As Tolerated  Restrictions/Precautions: Fall Risk, Weight Bearing     MOBILITY: I Mod I S SBA CGA Min Mod Max Total  NT x2 Comments:   Bed Mobility    Rolling [] [] [] [] [] [] [] [] [] [] []    Supine to Sit [] [] [] [] [] [x] [] [] [] [] []    Scooting [] [] [] [] [x] [] [] [] [] [] []    Sit to Supine [] [] [] [] [] [] [] [] [] [] []    Transfers    Sit to Stand [] [] [] [] [] [x] [x] [] [] [] [x] Cues for hand placement and forward lean   Bed to Chair [] [] [] [] [] [] [x] [] [] [] [x]    Stand to Sit [] [] [] [] [] [x] [x] [] [] [] [x] Cues for hand placement and letting R foot slide forward    [] [] [] [] [] [] [] [] [] [] []    I=Independent, Mod I=Modified Independent, S=Supervision, SBA=Standby Assistance, CGA=Contact Guard Assistance,   Min=Minimal Assistance, Mod=Moderate Assistance, Max=Maximal Assistance, Total=Total Assistance, NT=Not Tested    BALANCE: Good Fair+ Fair Fair- Poor NT Comments   Sitting Static [x] [] [] [] [] []    Sitting Dynamic [] [x] [] [] [] []              Standing Static [] [] [x] [] [] []    Standing Dynamic [] [] [] [x] [] []      GAIT: I Mod I S SBA CGA Min Mod Max Total  NT x2 Comments:   Level of Assistance [] [] [] [] [] [x] [x] [] [] [] [x] Less assist required as she walked   Distance 12 feet    DME Rolling Walker    Gait Quality Antalgic, Decreased kylie , Decreased step clearance, and Decreased step length    Weightbearing Status Right Lower Extremity Weight Bearing: Weight Bearing As Tolerated    Stairs      I=Independent, Mod I=Modified Independent, S=Supervision, SBA=Standby Assistance, CGA=Contact Guard Assistance, Min=Minimal Assistance, Mod=Moderate Assistance, Max=Maximal Assistance, Total=Total Assistance, NT=Not Tested    PLAN:   ACUTE PHYSICAL THERAPY GOALS:   (Developed with and agreed upon by patient and/or caregiver.)    (1.) Ale Medellin  will move from supine to sit and sit to supine  with MINIMAL ASSIST within 7 treatment day(s). (2.) Ale Medellin will transfer from bed to chair and chair to bed with STAND BY ASSIST using the least restrictive device within 7 treatment day(s). (3.) Ale Medellin will ambulate with STAND BY ASSIST for 50 feet with the least restrictive device within 7 treatment day(s). (4.) Ale Medellin will perform standing static and dynamic balance activities x 10 minutes with SUPERVISION to improve safety within 7 treatment day(s). (5.) Ale Medellin will perform bilateral lower extremity exercises x 20 min for HEP with INDEPENDENCE to improve strength, endurance, and functional mobility within 7 treatment day(s). FREQUENCY AND DURATION: BID for duration of hospital stay or until stated goals are met, whichever comes first.    TREATMENT:   TREATMENT:   Co-Treatment PT/OT necessary due to patient's decreased overall endurance/tolerance levels, as well as need for high level skilled assistance to complete functional transfers/mobility and functional tasks  Therapeutic Activity (15 Minutes): Therapeutic activity included Supine to Sit, Scooting, Ambulation on level ground, Sitting balance , and Standing balance to improve functional Activity tolerance, Balance, Coordination, and Mobility. Therapeutic Exercise (14 Minutes): Therapeutic exercises noted below to improve functional activity tolerance, AROM, and strength.      TREATMENT GRID:   Date:  8/24/22 Date:  8/26/22 Date:  8/27/22   Activity/Exercise Parameters Parameters Parameters   Supine AP 10x B X 10 B X 10 B   Supine heel slides 10x B AAR     Supine SAQ 10x B     Supine hip abdd 10x B AAR 10x B AAR X 10 B   LAQ  X 10 B X 10 B   Hip flexion  X 10 B X 10 B             AFTER TREATMENT PRECAUTIONS: Bed/Chair Locked, Call light within reach, Chair, Needs within reach, RN notified, and Visitors at bedside    INTERDISCIPLINARY COLLABORATION:  RN/ PCT, PT/ PTA, and OT/ GARCIA    EDUCATION:      TIME IN/OUT:  Time In: 1031  Time Out: 1100  Minutes: 29    ALEXANDRA SOMMER PTA

## 2022-08-27 NOTE — CARE COORDINATION
BRIGIDA reached out to liaison with Licha Odell to get status of auth. She confirmed that [facility] is still pending.

## 2022-08-27 NOTE — PROGRESS NOTES
OCCUPATIONAL THERAPY: Daily Note AM   OT Visit Days: 4   Time  OT Charge Capture  Rehab Caseload Tracker  OT Orders    Becca Stevens is a 80 y.o. female   PRIMARY DIAGNOSIS: Hip fracture requiring operative repair, left, closed, initial encounter ([facility]  Fall from bed, initial encounter [W06. XXXA]  Closed fracture of neck of right femur, initial encounter ([facility] [S72.001A]  [facility] operative repair, left, [facility] [S72.[facility]]  Procedure(s) (LRB):  HIP OPEN REDUCTION INTERNAL FIXATION FNS RIGHT (Right)  6 Days Post-Op  Inpatient: Payor: [facility] / Plan: Marylin Sales / Product Type: *No Product type* /     ASSESSMENT:     REHAB RECOMMENDATIONS: CURRENT LEVEL OF FUNCTION:  (Most Recently Demonstrated)   Recommendation to date pending progress:  Setting:  Short-term Rehab    Equipment:    To Be Determined Bathing: Moderate Assist  Dressing:  Maximal Assist-socks   Feeding/Grooming:  Stand by Assist  Toileting:  Not Tested   Functional Mobility:  Minimal Assist -mod A x 2     ASSESSMENT:  Ms. Beth Dockery is doing fair today. Pt was supine in bed upon arrival. Pt completed bed mobility with min A X 2. Pt completed functional mobility with min-mod A X 2 using rolling walker. Pt completed sponge bath, grooming and dressing with the assistance listed below. Continue POC.         SUBJECTIVE:     Ms. Beth Dockery states, \"Hey\"     Social/Functional Lives With: Family, Daughter  Type of Home: House  Home Layout: Two level, Performs ADL's on one level  Home Access: Level entry  Home Equipment: Lanre Highman, rolling, Wheelchair-manual  Has the patient had two or more falls in the past year or any fall with injury in the past year?: Yes  ADL Assistance: Needs assistance  Ambulation Assistance: Needs assistance  Transfer Assistance: Independent    OBJECTIVE:     Gurmeetelva Onofre / Roseanna Vega / AIRWAY: NA    RESTRICTIONS/PRECAUTIONS:  Restrictions/Precautions  Restrictions/Precautions: Fall Risk, Weight Bearing  Lower Extremity Weight Bearing Restrictions  Right Lower Extremity Weight Bearing: Weight Bearing As Tolerated        PAIN: VITALS / O2:   Pre Treatment:              Post Treatment: 0 Vitals          Oxygen            MOBILITY: I Mod I S SBA CGA Min Mod Max Total  NT x2 Comments:   Bed Mobility    Rolling [] [] [] [] [] [] [] [] [] [x] []    Supine to Sit [] [] [] [] [] [x] [] [] [] [] [x]    Scooting [] [] [] [] [] [] [] [] [] [] []    Sit to Supine [] [] [] [] [] [] [] [] [] [x] []    Transfers    Sit to Stand [] [] [] [] [] [x] [x] [] [] [] [x]    Bed to Chair [] [] [] [] [] [x] [x] [] [] [] [x]    Stand to Sit [] [] [] [] [] [x] [x] [] [] [] [x]    Tub/Shower [] [] [] [] [] [] [] [] [] [x] []     Toilet [] [] [] [] [] [] [] [] [] [x] []      [] [] [] [] [] [] [] [] [] [] []    I=Independent, Mod I=Modified Independent, S=Supervision/Setup, SBA=Standby Assistance, CGA=Contact Guard Assistance, Min=Minimal Assistance, Mod=Moderate Assistance, Max=Maximal Assistance, Total=Total Assistance, NT=Not Tested    ACTIVITIES OF DAILY LIVING: I Mod I S SBA CGA Min Mod Max Total NT Comments   BASIC ADLs:              Upper Body   Bathing [] [] [x] [] [] [] [] [] [] []    Lower Body Bathing [] [] [] [] [] [] [x] [] [] []    Toileting [] [] [] [] [] [] [] [] [] [x]    Upper Body Dressing [] [] [x] [] [] [] [] [] [] []    Lower Body Dressing [] [] [] [] [] [] [] [x] [] []    Feeding [] [] [] [] [] [] [] [] [] []    Grooming [] [] [] [x] [] [] [] [] [] []    Personal Device Care [] [] [] [] [] [] [] [] [] [x]    Functional Mobility [] [] [] [] [] [x] [x] [] [] [] x2   I=Independent, Mod I=Modified Independent, S=Supervision/Setup, SBA=Standby Assistance, CGA=Contact Guard Assistance, Min=Minimal Assistance, Mod=Moderate Assistance, Max=Maximal Assistance, Total=Total Assistance, NT=Not Tested    BALANCE: Good Fair+ Fair Fair- Poor NT Comments   Sitting Static [] [x] [] [] [] []    Sitting Dynamic [] [x] [] [] [] [] Standing Static [] [] [x] [] [] []    Standing Dynamic [] [] [x] [] [] []        PLAN:     FREQUENCY/DURATION   OT Plan of Care: 5 times/week for duration of hospital stay or until stated goals are met, whichever comes first.    ACUTE OCCUPATIONAL THERAPY GOALS:   (Developed with and agreed upon by patient and/or caregiver.)  1. Patient will complete lower body bathing and dressing with minimal assistance and adaptive equipment as needed. 2. Patient will complete toileting with minimal assistance. 3. Patient will tolerate 30 minutes of OT treatment with 2-3 rest breaks to increase activity tolerance for ADLs. 4. Patient will complete functional transfers with minimal assistance and adaptive equipment as needed. 5. Patient will complete functional mobility for household distances with minimal assistance and appropriate safety awareness. Timeframe: 7 visits     TREATMENT:     TREATMENT:   Self Care (29 minutes): Patient participated in upper body bathing, lower body bathing, upper body dressing, lower body dressing, and grooming ADLs in supported sitting with maximal verbal and manual cueing to increase independence and decrease assistance required. Patient also participated in functional mobility, functional transfer, and energy conservation training to increase independence, decrease assistance required, and increase safety awareness.      TREATMENT GRID:  N/A    AFTER TREATMENT PRECAUTIONS: Bed/Chair Locked, Call light within reach, Chair, Needs within reach, and Visitors at bedside    INTERDISCIPLINARY COLLABORATION:  RN/ PCT, PT/ PTA, and OT/ GARCIA    EDUCATION:       TOTAL TREATMENT DURATION AND TIME:  Time In: 1031  Time Out: 1100  Minutes: 13155 Kathryn Carey, NIKA

## 2022-08-27 NOTE — PROGRESS NOTES
Reviewed notes for spiritual concerns    NOTED:      Good visit with patient and family    Pt is calm    Family very receptive to     encouraged                Will follow as needed

## 2022-08-27 NOTE — PROGRESS NOTES
PHYSICAL THERAPY: Daily Note PM   (Link to Caseload Tracking: PT Visit Days : 5  Time In/Out PT Charge Capture  Rehab Caseload Tracker  Orders    Gabriel Adams is a 80 y.o. female   PRIMARY DIAGNOSIS: Hip fracture requiring operative repair, left, closed, initial encounter ([facility]  Fall from bed, initial encounter [W06. XXXA]  Closed fracture of neck of right femur, initial encounter ([facility] [S72.001A]  H[facility] fracture requiring operative repair, left, closed[facility] [S[facility]]  Procedure(s) (LRB):  HIP OPEN REDUCTION INTERNAL FIXATION FNS RIGHT (Right)  6 Days Post-Op  Inpatient: Payor: Mina Allred / Plan: Petra Damon / Product Type: *No Product type* /     ASSESSMENT:     REHAB RECOMMENDATIONS:   Recommendation to date pending progress:  Setting:  Short-term Rehab    Equipment:    To Be Determined     ASSESSMENT:  Ms. Jessica Ugarte presents sitting up in chair, family present. She typically does better in the afternoons and did today as well. She was able to stand with mod assist and increased her ambulation to 40' in room using rolling walker and min assist.  She continues to need cues for walker management, staying closer to the walker, guidance, and pushing thru her arms. She returned supine in bed and was propped onto her L side with pillow between her legs. Good progress towards goals with improved ambulation. Daughter and son in law present. Will continue with POC. SUBJECTIVE:   Ms. Jessica Ugarte states, \"I can do it. \"     Social/Functional Lives With: Family, Daughter  Type of Home: House  Home Layout: Two level, Performs ADL's on one level  Home Access: Level entry  Home Equipment: Cal Spore, rolling, Wheelchair-manual  Has the patient had two or more falls in the past year or any fall with injury in the past year?: Yes  ADL Assistance: Needs assistance  Ambulation Assistance: Needs assistance  Transfer Assistance: Independent  OBJECTIVE:     PAIN: Tay Ang / O2: Geoffrey Souza / Cathy Carter / DRAINS:   Pre Treatment:          Post Treatment: 2 Vitals        Oxygen    None    RESTRICTIONS/PRECAUTIONS:  Restrictions/Precautions  Restrictions/Precautions: Fall Risk, Weight Bearing  Lower Extremity Weight Bearing Restrictions  Right Lower Extremity Weight Bearing: Weight Bearing As Tolerated  Restrictions/Precautions: Fall Risk, Weight Bearing     MOBILITY: I Mod I S SBA CGA Min Mod Max Total  NT x2 Comments:   Bed Mobility    Rolling [] [] [] [] [] [] [] [] [] [] []    Supine to Sit [] [] [] [] [] [] [] [] [] [] []    Scooting [] [] [] [] [x] [] [] [] [] [] []    Sit to Supine [] [] [] [] [] [x] [x] [] [] [] [] With LEs   Transfers    Sit to Stand [] [] [] [] [] [] [x] [] [] [] [] Cues for hand placement and forward lean   Bed to Chair [] [] [] [] [] [] [x] [] [] [] [x]    Stand to Sit [] [] [] [] [] [x] [] [] [] [] [] Cues for hand placement and letting R foot slide forward    [] [] [] [] [] [] [] [] [] [] []    I=Independent, Mod I=Modified Independent, S=Supervision, SBA=Standby Assistance, CGA=Contact Guard Assistance,   Min=Minimal Assistance, Mod=Moderate Assistance, Max=Maximal Assistance, Total=Total Assistance, NT=Not Tested    BALANCE: Good Fair+ Fair Fair- Poor NT Comments   Sitting Static [x] [] [] [] [] []    Sitting Dynamic [] [x] [] [] [] []              Standing Static [] [] [x] [] [] []    Standing Dynamic [] [] [] [x] [] []      GAIT: I Mod I S SBA CGA Min Mod Max Total  NT x2 Comments:   Level of Assistance [] [] [] [] [] [x] [] [] [] [] [] Less assist required as she walked   Distance 40 feet    DME Rolling Walker    Gait Quality Antalgic, Decreased kylie , Decreased step clearance, and Decreased step length    Weightbearing Status Right Lower Extremity Weight Bearing: Weight Bearing As Tolerated    Stairs      I=Independent, Mod I=Modified Independent, S=Supervision, SBA=Standby Assistance, CGA=Contact Guard Assistance,   Min=Minimal Assistance, Mod=Moderate Assistance, Max=Maximal Assistance, Total=Total Assistance, NT=Not Tested    PLAN:   ACUTE PHYSICAL THERAPY GOALS:   (Developed with and agreed upon by patient and/or caregiver.)    (1.) Pakonne Orf  will move from supine to sit and sit to supine  with MINIMAL ASSIST within 7 treatment day(s). (2.) Wyvonne Orf will transfer from bed to chair and chair to bed with STAND BY ASSIST using the least restrictive device within 7 treatment day(s). (3.) Wyvonne Orf will ambulate with STAND BY ASSIST for 50 feet with the least restrictive device within 7 treatment day(s). (4.) Wyvonne Orf will perform standing static and dynamic balance activities x 10 minutes with SUPERVISION to improve safety within 7 treatment day(s). (5.) Wyvonne Orf will perform bilateral lower extremity exercises x 20 min for HEP with INDEPENDENCE to improve strength, endurance, and functional mobility within 7 treatment day(s). FREQUENCY AND DURATION: BID for duration of hospital stay or until stated goals are met, whichever comes first.    TREATMENT:   TREATMENT:   Therapeutic Activity (24 Minutes): Therapeutic activity included Sit to Supine, Scooting, Ambulation on level ground, Sitting balance , and Standing balance to improve functional Activity tolerance, Balance, Coordination, and Mobility.     TREATMENT GRID:   Date:  8/24/22 Date:  8/26/22 Date:  8/27/22   Activity/Exercise Parameters Parameters Parameters   Supine AP 10x B X 10 B X 10 B   Supine heel slides 10x B AAR     Supine SAQ 10x B     Supine hip abdd 10x B AAR 10x B AAR X 10 B   LAQ  X 10 B X 10 B   Hip flexion  X 10 B X 10 B             AFTER TREATMENT PRECAUTIONS: Bed, Bed/Chair Locked, Call light within reach, Needs within reach, RN notified, and Visitors at bedside    INTERDISCIPLINARY COLLABORATION:  RN/ PCT and PT/ PTA    EDUCATION:      TIME IN/OUT:  Time In: 1340  Time Out: 1404  Minutes: 24    ALEXANDRA SOMMER PTA

## 2022-08-28 ENCOUNTER — APPOINTMENT (OUTPATIENT)
Dept: GENERAL RADIOLOGY | Age: 87
DRG: 480 | End: 2022-08-28
Payer: MEDICARE

## 2022-08-28 PROBLEM — U07.1 COVID-19: Status: ACTIVE | Noted: 2022-08-28

## 2022-08-28 PROBLEM — J12.82 PNEUMONIA DUE TO COVID-19 VIRUS: Status: ACTIVE | Noted: 2022-08-28

## 2022-08-28 LAB
ANION GAP SERPL CALC-SCNC: 1 MMOL/L (ref 7–16)
BASOPHILS # BLD: 0 K/UL (ref 0–0.2)
BASOPHILS NFR BLD: 1 % (ref 0–2)
BUN SERPL-MCNC: 18 MG/DL (ref 8–23)
CALCIUM SERPL-MCNC: 8.5 MG/DL (ref 8.3–10.4)
CHLORIDE SERPL-SCNC: 106 MMOL/L (ref 98–107)
CO2 SERPL-SCNC: 28 MMOL/L (ref 21–32)
CREAT SERPL-MCNC: 1 MG/DL (ref 0.6–1)
DIFFERENTIAL METHOD BLD: ABNORMAL
EOSINOPHIL # BLD: 0.1 K/UL (ref 0–0.8)
EOSINOPHIL NFR BLD: 1 % (ref 0.5–7.8)
ERYTHROCYTE [DISTWIDTH] IN BLOOD BY AUTOMATED COUNT: 14.2 % (ref 11.9–14.6)
GLUCOSE SERPL-MCNC: 113 MG/DL (ref 65–100)
HCT VFR BLD AUTO: 34.7 % (ref 35.8–46.3)
HGB BLD-MCNC: 11.1 G/DL (ref 11.7–15.4)
IMM GRANULOCYTES # BLD AUTO: 0 K/UL (ref 0–0.5)
IMM GRANULOCYTES NFR BLD AUTO: 1 % (ref 0–5)
LYMPHOCYTES # BLD: 1.2 K/UL (ref 0.5–4.6)
LYMPHOCYTES NFR BLD: 16 % (ref 13–44)
MCH RBC QN AUTO: 27.6 PG (ref 26.1–32.9)
MCHC RBC AUTO-ENTMCNC: 32 G/DL (ref 31.4–35)
MCV RBC AUTO: 86.3 FL (ref 79.6–97.8)
MONOCYTES # BLD: 0.8 K/UL (ref 0.1–1.3)
MONOCYTES NFR BLD: 10 % (ref 4–12)
NEUTS SEG # BLD: 5.6 K/UL (ref 1.7–8.2)
NEUTS SEG NFR BLD: 71 % (ref 43–78)
NRBC # BLD: 0 K/UL (ref 0–0.2)
PLATELET # BLD AUTO: 308 K/UL (ref 150–450)
PMV BLD AUTO: 10.2 FL (ref 9.4–12.3)
POTASSIUM SERPL-SCNC: 3.8 MMOL/L (ref 3.5–5.1)
RBC # BLD AUTO: 4.02 M/UL (ref 4.05–5.2)
SARS-COV-2 RDRP RESP QL NAA+PROBE: DETECTED
SODIUM SERPL-SCNC: 135 MMOL/L (ref 136–145)
SOURCE: ABNORMAL
WBC # BLD AUTO: 7.8 K/UL (ref 4.3–11.1)

## 2022-08-28 PROCEDURE — 36415 COLL VENOUS BLD VENIPUNCTURE: CPT

## 2022-08-28 PROCEDURE — 6370000000 HC RX 637 (ALT 250 FOR IP): Performed by: HOSPITALIST

## 2022-08-28 PROCEDURE — 6370000000 HC RX 637 (ALT 250 FOR IP): Performed by: ORTHOPAEDIC SURGERY

## 2022-08-28 PROCEDURE — 97110 THERAPEUTIC EXERCISES: CPT

## 2022-08-28 PROCEDURE — 85025 COMPLETE CBC W/AUTO DIFF WBC: CPT

## 2022-08-28 PROCEDURE — 2580000003 HC RX 258: Performed by: ORTHOPAEDIC SURGERY

## 2022-08-28 PROCEDURE — 6370000000 HC RX 637 (ALT 250 FOR IP): Performed by: INTERNAL MEDICINE

## 2022-08-28 PROCEDURE — 71045 X-RAY EXAM CHEST 1 VIEW: CPT

## 2022-08-28 PROCEDURE — 1100000000 HC RM PRIVATE

## 2022-08-28 PROCEDURE — 2580000003 HC RX 258: Performed by: INTERNAL MEDICINE

## 2022-08-28 PROCEDURE — 87635 SARS-COV-2 COVID-19 AMP PRB: CPT

## 2022-08-28 PROCEDURE — 97530 THERAPEUTIC ACTIVITIES: CPT

## 2022-08-28 PROCEDURE — 80048 BASIC METABOLIC PNL TOTAL CA: CPT

## 2022-08-28 RX ORDER — AMOXICILLIN AND CLAVULANATE POTASSIUM 875; 125 MG/1; MG/1
1 TABLET, FILM COATED ORAL EVERY 12 HOURS SCHEDULED
Status: DISCONTINUED | OUTPATIENT
Start: 2022-08-28 | End: 2022-08-29 | Stop reason: HOSPADM

## 2022-08-28 RX ORDER — AMOXICILLIN AND CLAVULANATE POTASSIUM 875; 125 MG/1; MG/1
1 TABLET, FILM COATED ORAL 2 TIMES DAILY
Qty: 14 TABLET | Refills: 0 | Status: SHIPPED | OUTPATIENT
Start: 2022-08-28 | End: 2022-09-04

## 2022-08-28 RX ADMIN — TRAZODONE HYDROCHLORIDE 100 MG: 50 TABLET ORAL at 20:27

## 2022-08-28 RX ADMIN — SODIUM CHLORIDE, PRESERVATIVE FREE 5 ML: 5 INJECTION INTRAVENOUS at 08:54

## 2022-08-28 RX ADMIN — ACETAMINOPHEN 650 MG: 325 TABLET ORAL at 09:00

## 2022-08-28 RX ADMIN — AMLODIPINE BESYLATE 5 MG: 5 TABLET ORAL at 08:54

## 2022-08-28 RX ADMIN — ASPIRIN 325 MG: 325 TABLET, COATED ORAL at 20:27

## 2022-08-28 RX ADMIN — METOPROLOL SUCCINATE 50 MG: 50 TABLET, EXTENDED RELEASE ORAL at 08:53

## 2022-08-28 RX ADMIN — SODIUM CHLORIDE, PRESERVATIVE FREE 10 ML: 5 INJECTION INTRAVENOUS at 20:28

## 2022-08-28 RX ADMIN — FLUOXETINE HYDROCHLORIDE 40 MG: 20 CAPSULE ORAL at 08:54

## 2022-08-28 RX ADMIN — ASPIRIN 325 MG: 325 TABLET, COATED ORAL at 08:53

## 2022-08-28 RX ADMIN — AMOXICILLIN AND CLAVULANATE POTASSIUM 1 TABLET: 875; 125 TABLET, FILM COATED ORAL at 13:02

## 2022-08-28 RX ADMIN — SODIUM CHLORIDE, PRESERVATIVE FREE 20 ML: 5 INJECTION INTRAVENOUS at 20:28

## 2022-08-28 ASSESSMENT — PAIN SCALES - GENERAL
PAINLEVEL_OUTOF10: 2
PAINLEVEL_OUTOF10: 2
PAINLEVEL_OUTOF10: 3

## 2022-08-28 ASSESSMENT — PAIN DESCRIPTION - DESCRIPTORS: DESCRIPTORS: ACHING

## 2022-08-28 ASSESSMENT — PAIN DESCRIPTION - ORIENTATION: ORIENTATION: ANTERIOR

## 2022-08-28 ASSESSMENT — PAIN DESCRIPTION - LOCATION: LOCATION: HEAD

## 2022-08-28 NOTE — PROGRESS NOTES
PHYSICAL THERAPY: Daily Note PM   (Link to Caseload Tracking: PT Visit Days : 6  Time In/Out PT Charge Capture  Rehab Caseload Tracker  Orders    Alfonso Bhandari is a 80 y.o. female   PRIMARY DIAGNOSIS: Hip fracture requiring operative repair, left, closed, initial encounter ([facility] [address]  Fall from bed, initial encounter [W06. XXXA]  Closed fracture of neck of right femur, initial encounter ([facility] [facility] [S72.001A]  [facility] fracture requiring operative repair, left, closed[facility] [S[facility]]  Procedure(s) (LRB):  HIP OPEN REDUCTION INTERNAL FIXATION FNS RIGHT (Right)  7 Days Post-Op  Inpatient: Payor: Stephanie Mckeon / Plan: Lenard Nunez / Product Type: *No Product type* /     ASSESSMENT:     REHAB RECOMMENDATIONS:   Recommendation to date pending progress:  Setting:  Short-term Rehab    Equipment:    To Be Determined     ASSESSMENT:  Ms. Cony Briones presents sitting up in chair, now Covid +. She stood with mod assist and ambulated 20' in room using rolling walker and min assist.  She continues to require verbal and manual cues to push with her arms, to stay closer to the walker, and for guidance. She returned supine in bed with min assist.  She is the sweetest lady and is making good progress towards goals. Will continue with POC.        SUBJECTIVE:   Ms. Cony Briones states, \"I'm fine, I just have a little headache\"     Social/Functional Lives With: Family, Daughter  Type of Home: House  Home Layout: Two level, Performs ADL's on one level  Home Access: Level entry  Home Equipment: Kristian Larios, rolling, Wheelchair-manual  Has the patient had two or more falls in the past year or any fall with injury in the past year?: Yes  ADL Assistance: Needs assistance  Ambulation Assistance: Needs assistance  Transfer Assistance: Independent  OBJECTIVE:     PAIN: VITALS / O2: PRECAUTION / Alonso Im / DRAINS:   Pre Treatment:   Pain Assessment: 0-10  Pain Level: 3      Post Treatment: 3 Vitals        Oxygen None    RESTRICTIONS/PRECAUTIONS:  Restrictions/Precautions  Restrictions/Precautions: Fall Risk, Weight Bearing  Lower Extremity Weight Bearing Restrictions  Right Lower Extremity Weight Bearing: Weight Bearing As Tolerated  Restrictions/Precautions: Fall Risk, Weight Bearing     MOBILITY: I Mod I S SBA CGA Min Mod Max Total  NT x2 Comments:   Bed Mobility    Rolling [] [] [] [] [] [] [] [] [] [] []    Supine to Sit [] [] [] [] [] [] [] [] [] [] []    Scooting [] [] [] [] [x] [] [] [] [] [] []    Sit to Supine [] [] [] [] [] [x] [] [] [] [] []    Transfers    Sit to Stand [] [] [] [] [] [] [x] [] [] [] [] Cues for hand placement and forward lean   Bed to Chair [] [] [] [] [] [] [x] [] [] [] [x]    Stand to Sit [] [] [] [] [] [x] [] [] [] [] [] Cues for hand placement and letting R foot slide forward    [] [] [] [] [] [] [] [] [] [] []    I=Independent, Mod I=Modified Independent, S=Supervision, SBA=Standby Assistance, CGA=Contact Guard Assistance,   Min=Minimal Assistance, Mod=Moderate Assistance, Max=Maximal Assistance, Total=Total Assistance, NT=Not Tested    BALANCE: Good Fair+ Fair Fair- Poor NT Comments   Sitting Static [x] [] [] [] [] []    Sitting Dynamic [] [x] [] [] [] []              Standing Static [] [] [x] [] [] []    Standing Dynamic [] [] [] [x] [] []      GAIT: I Mod I S SBA CGA Min Mod Max Total  NT x2 Comments:   Level of Assistance [] [] [] [] [] [x] [] [] [] [] [] Less assist required as she walked   Distance 20 feet    DME Rolling Walker    Gait Quality Antalgic, Decreased kylie , Decreased step clearance, and Decreased step length    Weightbearing Status Right Lower Extremity Weight Bearing: Weight Bearing As Tolerated    Stairs      I=Independent, Mod I=Modified Independent, S=Supervision, SBA=Standby Assistance, CGA=Contact Guard Assistance,   Min=Minimal Assistance, Mod=Moderate Assistance, Max=Maximal Assistance, Total=Total Assistance, NT=Not Tested    PLAN:   ACUTE PHYSICAL THERAPY GOALS:   (Developed with and agreed upon by patient and/or caregiver.)    (1.) Janak Moore  will move from supine to sit and sit to supine  with MINIMAL ASSIST within 7 treatment day(s). (2.) Janak Moore will transfer from bed to chair and chair to bed with STAND BY ASSIST using the least restrictive device within 7 treatment day(s). (3.) Janak Moore will ambulate with STAND BY ASSIST for 50 feet with the least restrictive device within 7 treatment day(s). (4.) Janak Moore will perform standing static and dynamic balance activities x 10 minutes with SUPERVISION to improve safety within 7 treatment day(s). (5.) Janak Moore will perform bilateral lower extremity exercises x 20 min for HEP with INDEPENDENCE to improve strength, endurance, and functional mobility within 7 treatment day(s). FREQUENCY AND DURATION: BID for duration of hospital stay or until stated goals are met, whichever comes first.    TREATMENT:   TREATMENT:   Therapeutic Activity (23 Minutes): Therapeutic activity included Sit to Supine, Scooting, Ambulation on level ground, Sitting balance , and Standing balance to improve functional Activity tolerance, Balance, Coordination, and Mobility.     TREATMENT GRID:   Date:  8/24/22 Date:  8/26/22 Date:  8/27/22 Date:  8/28/22   Activity/Exercise Parameters Parameters Parameters    Supine AP 10x B X 10 B X 10 B X 15 B    Supine heel slides 10x B AAR      Supine SAQ 10x B      Supine hip abdd 10x B AAR 10x B AAR X 10 B X 15 B    LAQ  X 10 B X 10 B X 15 B    Hip flexion  X 10 B X 10 B X 15 B               AFTER TREATMENT PRECAUTIONS: Bed, Bed/Chair Locked, Call light within reach, Needs within reach, and RN notified    INTERDISCIPLINARY COLLABORATION:  RN/ PCT and PT/ PTA    EDUCATION:      TIME IN/OUT:  Time In: 1330  Time Out: 1353  Minutes: 23    ALEXANDRA SOMMER PTA

## 2022-08-28 NOTE — PROGRESS NOTES
TRANSFER - OUT REPORT:    Verbal report given to Cristina Mcconnell RN on Nadja Hamilton  being transferred to [facility] for change in patient condition (COVID positive)       Report consisted of patient's Situation, Background, Assessment and   Recommendations(SBAR). Information from the following report(s) Nurse Handoff Report was reviewed with the receiving nurse. Lines:   Peripheral IV 08/21/22 Right Antecubital (Active)   Site Assessment Clean, dry & intact 08/28/22 1009   Line Status Flushed 08/28/22 [address] changed 08/26/22 1929   Phlebitis Assessment No symptoms 08/28/22 1009   Infiltration Assessment 0 08/28/22 1009   Alcohol Cap Used Yes 08/28/22 1009   Dressing Status Clean, dry & intact 08/28/22 1009   Dressing Type Transparent 08/28/22 1009        Opportunity for questions and clarification was provided.       Patient transported with:  [employer]

## 2022-08-28 NOTE — CARE COORDINATION
Patient's insurance has been approved, MD notified. Patient is up for discharge - will discharge to [facility], report [phone]. BRIGIDA spoke with Juan Ortiz, liaison from [facility] and confirmed transport there today. BRIGIDA contacted [facility] and arranged transport for 2:00. RN notified to do Covid Rapid on patient. Packet will be completed and given to  once swab results. Scripts already placed in packet. 1:00 - Covid swab came back +. Ambulance ride cancelled with Radha May. Brit notified that d/c cancelled at this time. She also confirmed that facility does not have any covid beds available today.

## 2022-08-28 NOTE — PROGRESS NOTES
Hospitalist Progress Note   Admit Date:  2022 12:12 AM   Name:  Gabriel Adams   Age:  80 y.o. Sex:  female  :  1929   MRN:  [id number]   Room:  [facility]/    Presenting Complaint: Fall     Reason(s) for Admission: Fall from bed, initial encounter [W06. XXXA]  [facility] neck of right femur[facility] [S72.001A]  Hip fracture requiring operative repair, left, closed, initial encounter [facility]) [facility] [place] Course & Interval History:     Gabriel Adams is a 80 y.o. female with medical history of   Hypertension  hypothyroidism      who presented with right hip pain after a fall. Patient lives at home with her daughter. She was trying to get out of bed this morning. She slipped out of bed and fell on the floor. She did not hit her head. She was awake and alert the whole time. She landed on her right hip. She felt the pain and could not get up. She was brought to emergency room. She was found to have fracture of her right hip. Patient underwent right hip open reduction and internal fixation on 2022. No complications    STFSFWMLOS/13UO Events (22): Same as yesterday. No changes or complaints. Feeling well. No CP, SOB      Assessment & Plan:       Hip fracture requiring operative repair, left, closed, initial encounter [facility])  Plan:   -Can go to SNF when auth obtained; maybe later today       Essential hypertension  Plan:   -Controlled. Continue current management      Acquired hypothyroidism  Plan:   -Controlled. Continue current management    Diet:  ADULT DIET;  Regular  DVT PPx: aspirin   Code status: Full Code    Hospital Problems:  Principal Problem:    Hip fracture requiring operative repair, left, closed, initial encounter [facility])  Active Problems:    Essential hypertension    Acquired hypothyroidism    CKD (chronic kidney disease) stage 3, GFR 30-59 ml/min (HCC)    Dementia (HCC)    Constipation  Resolved Problems:    * No showing: Other Studies:  XR HIP RIGHT (1 VIEW)   Final Result   Typical postsurgical changes. XR HIP RIGHT (2-3 VIEWS)   Final Result   Postsurgical change as described. NC XR TECHNOLOGIST SERVICE   Final Result      CT HIP RIGHT WO CONTRAST   Final Result   There appears to be fracture in the anterolateral head of the right femur. The   age of this fracture is unclear as there is no associated fluid or soft tissue   edema. Please correlate clinically. XR HIP 2-3 VW W PELVIS RIGHT   Final Result   Findings as above. Current Meds:  Current Facility-Administered Medications   Medication Dose Route Frequency    traMADol (ULTRAM) tablet 25 mg  25 mg Oral Q6H PRN    metoprolol succinate (TOPROL XL) extended release tablet 50 mg  50 mg Oral Daily    amLODIPine (NORVASC) tablet 5 mg  5 mg Oral Daily    FLUoxetine (PROZAC) capsule 40 mg  40 mg Oral Daily    sodium chloride flush 0.9 % injection 5-40 mL  5-40 mL IntraVENous 2 times per day    sodium chloride flush 0.9 % injection 5-40 mL  5-40 mL IntraVENous PRN    0.9 % sodium chloride infusion   IntraVENous PRN    ondansetron (ZOFRAN-ODT) disintegrating tablet 4 mg  4 mg Oral Q8H PRN    Or    ondansetron (ZOFRAN) injection 4 mg  4 mg IntraVENous Q6H PRN    polyethylene glycol (GLYCOLAX) packet 17 g  17 g Oral Daily PRN    acetaminophen (TYLENOL) tablet 650 mg  650 mg Oral Q6H PRN    Or    acetaminophen (TYLENOL) suppository 650 mg  650 mg Rectal Q6H PRN    sodium chloride flush 0.9 % injection 5-40 mL  5-40 mL IntraVENous 2 times per day    sodium chloride flush 0.9 % injection 5-40 mL  5-40 mL IntraVENous PRN    0.9 % sodium chloride infusion   IntraVENous PRN    aspirin EC tablet 325 mg  325 mg Oral BID    traZODone (DESYREL) tablet 100 mg  100 mg Oral Nightly       Signed:  Arnaud Gutiérrez MD    Part of this note may have been written by using a voice dictation software.   The note has been proof read but may still contain some grammatical/other typographical errors.

## 2022-08-28 NOTE — PROGRESS NOTES
Hospitalist Progress Note   Admit Date:  2022 12:12 AM   Name:  Brianne Burroughs   Age:  80 y.o. Sex:  female  :  1929   MRN:  [id number]   Room:  71/    Presenting Complaint: Fall     Reason(s) for Admission: Fall from bed, initial encounter [W06. XXXA]  Closed fracture of neck of right femur, initial encounter ([facility] [place] [facility] [S72.001A]  Hip fracture requiring operative repair, left, closed, initial encounter [facility]) [facility] Course & Interval History:     Brianne Burroughs is a 80 y.o. female with medical history of   Hypertension  hypothyroidism      who presented with right hip pain after a fall. Patient lives at home with her daughter. She was trying to get out of bed this morning. She slipped out of bed and fell on the floor. She did not hit her head. She was awake and alert the whole time. She landed on her right hip. She felt the pain and could not get up. She was brought to emergency room. She was found to have fracture of her right hip. Patient underwent right hip open reduction and internal fixation on 2022. No complications    ULMYDOELDL/82LM Events (22): Fever documented last night. But patient says she feels well; wasn't aware. No complaints. Pt denies but daughter reports occasional coughing. No SOB,  CP, urinary symptoms. Assessment & Plan:       Fever  22 - new issue. Check CXR, UA, BMP, CBC      Hip fracture requiring operative repair, left, closed, initial encounter [facility])  Plan:   -Can probably go to SNF when insurance auth obtained       Essential hypertension  Plan:   -Controlled. Continue current management      Acquired hypothyroidism  Plan:   -Controlled. Continue current management    Diet:  ADULT DIET;  Regular  DVT PPx: aspirin   Code status: Full Code    Hospital Problems:  Principal Problem:    Hip fracture requiring operative repair, left, closed, initial encounter [facility])  Active Problems:    Essential hypertension    Acquired hypothyroidism    CKD (chronic kidney disease) stage 3, GFR 30-59 ml/min (HCC)    Dementia (HCC)    Constipation  Resolved Problems:    * No resolved hospital problems. *      Objective:   Patient Vitals for the past 24 hrs:   Temp Pulse Resp BP SpO2   08/28/22 0745 98.1 °F (36.7 °C) 67 17 131/68 95 %   08/28/22 0341 98.4 °F (36.9 °C) 72 16 (!) 145/77 95 %   08/27/22 2317 98.6 °F (37 °C) 70 18 138/80 96 %   08/27/22 2017 99.2 °F (37.3 °C) -- -- -- --   08/27/22 1931 (!) 102.6 °F (39.2 °C) 71 18 (!) 159/66 96 %   08/27/22 1610 98.2 °F (36.8 °C) 69 16 (!) 158/69 97 %   08/27/22 1205 98.1 °F (36.7 °C) 64 16 (!) 149/59 97 %       Oxygen Therapy  SpO2: 95 %  Pulse via Oximetry: 63 beats per minute  Pulse Oximeter Device Mode: Intermittent  Pulse Oximeter Device Location: Right  O2 Device: None (Room air)  O2 Flow Rate (L/min): 3 L/min    There is no height or weight on file to calculate BMI. Intake/Output Summary (Last 24 hours) at 8/28/2022 0812  Last data filed at 8/28/2022 0341  Gross per 24 hour   Intake 600 ml   Output 500 ml   Net 100 ml         Physical Exam:     Blood pressure 131/68, pulse 67, temperature 98.1 °F (36.7 °C), temperature source Oral, resp. rate 17, SpO2 95 %. General:          Well nourished. Head:               Normocephalic, atraumatic  Eyes:               Sclerae appear normal.  Pupils equally round. ENT:                Nares appear normal, no drainage. Moist oral mucosa  Neck:               No restricted ROM. Trachea midline   CV:                  RRR. No jugular venous distension. Lungs:             CTAB. symmetric expansion. Abdomen:        s/nt/nd. Extremities:     no edema, erythema  Skin:                No rashes and normal coloration. Warm and dry. Neuro:             CN II-XII grossly intact. Sensation intact. Psych:             Normal mood and affect.          I have personally reviewed labs and tests showing:  Recent Labs:  No results found for this or any previous visit (from the past 48 hour(s)). I have personally reviewed imaging studies showing: Other Studies:  XR HIP RIGHT (1 VIEW)   Final Result   Typical postsurgical changes. XR HIP RIGHT (2-3 VIEWS)   Final Result   Postsurgical change as described. NC XR TECHNOLOGIST SERVICE   Final Result      CT HIP RIGHT WO CONTRAST   Final Result   There appears to be fracture in the anterolateral head of the right femur. The   age of this fracture is unclear as there is no associated fluid or soft tissue   edema. Please correlate clinically. XR HIP 2-3 VW W PELVIS RIGHT   Final Result   Findings as above.          XR CHEST PORTABLE    (Results Pending)       Current Meds:  Current Facility-Administered Medications   Medication Dose Route Frequency    traMADol (ULTRAM) tablet 25 mg  25 mg Oral Q6H PRN    metoprolol succinate (TOPROL XL) extended release tablet 50 mg  50 mg Oral Daily    amLODIPine (NORVASC) tablet 5 mg  5 mg Oral Daily    FLUoxetine (PROZAC) capsule 40 mg  40 mg Oral Daily    sodium chloride flush 0.9 % injection 5-40 mL  5-40 mL IntraVENous 2 times per day    sodium chloride flush 0.9 % injection 5-40 mL  5-40 mL IntraVENous PRN    0.9 % sodium chloride infusion   IntraVENous PRN    ondansetron (ZOFRAN-ODT) disintegrating tablet 4 mg  4 mg Oral Q8H PRN    Or    ondansetron (ZOFRAN) injection 4 mg  4 mg IntraVENous Q6H PRN    polyethylene glycol (GLYCOLAX) packet 17 g  17 g Oral Daily PRN    acetaminophen (TYLENOL) tablet 650 mg  650 mg Oral Q6H PRN    Or    acetaminophen (TYLENOL) suppository 650 mg  650 mg Rectal Q6H PRN    sodium chloride flush 0.9 % injection 5-40 mL  5-40 mL IntraVENous 2 times per day    sodium chloride flush 0.9 % injection 5-40 mL  5-40 mL IntraVENous PRN    0.9 % sodium chloride infusion   IntraVENous PRN    aspirin EC tablet 325 mg  325 mg Oral BID    traZODone (DESYREL) tablet 100 mg  100 mg Oral Nightly       Signed:  Michelle Lerma Fabián Valencia MD    Part of this note may have been written by using a voice dictation software. The note has been proof read but may still contain some grammatical/other typographical errors.

## 2022-08-28 NOTE — PROGRESS NOTES
PHYSICAL THERAPY: Daily Note AM   (Link to Caseload Tracking: PT Visit Days : 6  Time In/Out PT Charge Capture  Rehab Caseload Tracker  Orders    Elkin Johnson is a 80 y.o. female   PRIMARY DIAGNOSIS: Hip fracture requiring operative repair, left, closed, initial encounter ([facility]  Fall from bed, initial encounter [W06. XXXA]  Closed fracture of neck of right femur, initial encounter ([facility] [S72.001A]  Hip fracture requiring operative repair, left, closed, [facility] [S72.002A]  Procedure(s) (LRB):  HIP OPEN REDUCTION INTERNAL FIXATION FNS RIGHT (Right)  7 Days Post-Op  Inpatient: Payor: Lindy Bowling / Plan: Angel Strauss / Product Type: *No Product type* /     ASSESSMENT:     REHAB RECOMMENDATIONS:   Recommendation to date pending progress:  Setting:  Short-term Rehab    Equipment:    To Be Determined     ASSESSMENT:  Ms. Antonino Rubio presents supine in bed, daughter at bedside. She sat to side of bed with min assist and additional time. She was able to stand with mod assist and ambulate 15' in room using rolling walker and min assist.  She needs cues for staying closer to walker at times, guidance, and walker management. She returned to chair and performed below LE exercises with cues to complete correctly. She is making good progress towards goals. Will continue with POC. SUBJECTIVE:   Ms. Antonino Rubio states, \"You want me to get in the chair? .\"     Social/Functional Lives With: Family, Daughter  Type of Home: House  Home Layout: Two level, Performs ADL's on one level  Home Access: Level entry  Home Equipment: Valentino Sings, rolling, Wheelchair-manual  Has the patient had two or more falls in the past year or any fall with injury in the past year?: Yes  ADL Assistance: Needs assistance  Ambulation Assistance: Needs assistance  Transfer Assistance: Independent  OBJECTIVE:     PAIN: VITALS / O2: PRECAUTION / Marolyn Gals / DRAINS:   Pre Treatment:   Pain Assessment: 0-10  Pain Level: 2      Post Treatment: 2 Vitals        Oxygen    None    RESTRICTIONS/PRECAUTIONS:  Restrictions/Precautions  Restrictions/Precautions: Fall Risk, Weight Bearing  Lower Extremity Weight Bearing Restrictions  Right Lower Extremity Weight Bearing: Weight Bearing As Tolerated  Restrictions/Precautions: Fall Risk, Weight Bearing     MOBILITY: I Mod I S SBA CGA Min Mod Max Total  NT x2 Comments:   Bed Mobility    Rolling [] [] [] [] [] [] [] [] [] [] []    Supine to Sit [] [] [] [] [] [x] [] [] [] [] [] With LEs   Scooting [] [] [] [] [x] [] [] [] [] [] []    Sit to Supine [] [] [] [] [] [] [] [] [] [] []    Transfers    Sit to Stand [] [] [] [] [] [] [x] [] [] [] [] Cues for hand placement and forward lean   Bed to Chair [] [] [] [] [] [] [x] [] [] [] [x]    Stand to Sit [] [] [] [] [] [x] [] [] [] [] [] Cues for hand placement and letting R foot slide forward    [] [] [] [] [] [] [] [] [] [] []    I=Independent, Mod I=Modified Independent, S=Supervision, SBA=Standby Assistance, CGA=Contact Guard Assistance,   Min=Minimal Assistance, Mod=Moderate Assistance, Max=Maximal Assistance, Total=Total Assistance, NT=Not Tested    BALANCE: Good Fair+ Fair Fair- Poor NT Comments   Sitting Static [x] [] [] [] [] []    Sitting Dynamic [] [x] [] [] [] []              Standing Static [] [] [x] [] [] []    Standing Dynamic [] [] [] [x] [] []      GAIT: I Mod I S SBA CGA Min Mod Max Total  NT x2 Comments:   Level of Assistance [] [] [] [] [] [x] [] [] [] [] [] Less assist required as she walked   Distance 15 feet    DME Rolling Walker    Gait Quality Antalgic, Decreased kylie , Decreased step clearance, and Decreased step length    Weightbearing Status Right Lower Extremity Weight Bearing: Weight Bearing As Tolerated    Stairs      I=Independent, Mod I=Modified Independent, S=Supervision, SBA=Standby Assistance, CGA=Contact Guard Assistance,   Min=Minimal Assistance, Mod=Moderate Assistance, Max=Maximal Assistance, Total=Total Assistance, NT=Not Tested    PLAN:   ACUTE PHYSICAL THERAPY GOALS:   (Developed with and agreed upon by patient and/or caregiver.)    (1.) Elvin Bloom  will move from supine to sit and sit to supine  with MINIMAL ASSIST within 7 treatment day(s). (2.) Elvin Bloom will transfer from bed to chair and chair to bed with STAND BY ASSIST using the least restrictive device within 7 treatment day(s). (3.) Elvin Bloom will ambulate with STAND BY ASSIST for 50 feet with the least restrictive device within 7 treatment day(s). (4.) Elvin Bloom will perform standing static and dynamic balance activities x 10 minutes with SUPERVISION to improve safety within 7 treatment day(s). (5.) Elvin Bloom will perform bilateral lower extremity exercises x 20 min for HEP with INDEPENDENCE to improve strength, endurance, and functional mobility within 7 treatment day(s). FREQUENCY AND DURATION: BID for duration of hospital stay or until stated goals are met, whichever comes first.    TREATMENT:   TREATMENT:   Therapeutic Activity (15 Minutes): Therapeutic activity included Supine to Sit, Scooting, Ambulation on level ground, Sitting balance , and Standing balance to improve functional Activity tolerance, Balance, Coordination, and Mobility. Therapeutic Exercise (8 Minutes): Therapeutic exercises noted below to improve functional activity tolerance, AROM, and strength.      TREATMENT GRID:   Date:  8/24/22 Date:  8/26/22 Date:  8/27/22 Date:  8/28/22   Activity/Exercise Parameters Parameters Parameters    Supine AP 10x B X 10 B X 10 B X 15 B    Supine heel slides 10x B AAR      Supine SAQ 10x B      Supine hip abdd 10x B AAR 10x B AAR X 10 B X 15 B    LAQ  X 10 B X 10 B X 15 B    Hip flexion  X 10 B X 10 B X 15 B               AFTER TREATMENT PRECAUTIONS: Bed/Chair Locked, Call light within reach, Chair, Needs within reach, RN notified, and Visitors at bedside    INTERDISCIPLINARY COLLABORATION:  RN/ PCT and PT/ PTA    EDUCATION:      TIME IN/OUT:  Time In: 0935  Time Out: 2031  Minutes: 23    ALEXANDRA H ROS, PTA

## 2022-08-28 NOTE — PROGRESS NOTES
TRANSFER - IN REPORT:    Verbal report received from [facility] on [employer]  being received from 7th Floor for change in patient condition (covid +)  COVID +    Report consisted of patient's Situation, Background, Assessment and   Recommendations(SBAR). Information from the following report(s) Nurse Handoff Report, Intake/Output, MAR, and Recent Results was reviewed with the receiving nurse. Opportunity for questions and clarification was provided. Assessment completed upon patient's arrival to unit and care assumed.

## 2022-08-29 VITALS
TEMPERATURE: 98.8 F | HEART RATE: 66 BPM | OXYGEN SATURATION: 95 % | RESPIRATION RATE: 19 BRPM | DIASTOLIC BLOOD PRESSURE: 73 MMHG | SYSTOLIC BLOOD PRESSURE: 165 MMHG

## 2022-08-29 PROCEDURE — 97535 SELF CARE MNGMENT TRAINING: CPT

## 2022-08-29 PROCEDURE — 97530 THERAPEUTIC ACTIVITIES: CPT

## 2022-08-29 PROCEDURE — 97164 PT RE-EVAL EST PLAN CARE: CPT

## 2022-08-29 PROCEDURE — 6370000000 HC RX 637 (ALT 250 FOR IP): Performed by: INTERNAL MEDICINE

## 2022-08-29 PROCEDURE — 2580000003 HC RX 258: Performed by: INTERNAL MEDICINE

## 2022-08-29 PROCEDURE — 2580000003 HC RX 258: Performed by: ORTHOPAEDIC SURGERY

## 2022-08-29 PROCEDURE — 97112 NEUROMUSCULAR REEDUCATION: CPT

## 2022-08-29 PROCEDURE — 6370000000 HC RX 637 (ALT 250 FOR IP): Performed by: ORTHOPAEDIC SURGERY

## 2022-08-29 RX ADMIN — FLUOXETINE HYDROCHLORIDE 40 MG: 20 CAPSULE ORAL at 09:27

## 2022-08-29 RX ADMIN — SODIUM CHLORIDE, PRESERVATIVE FREE 10 ML: 5 INJECTION INTRAVENOUS at 09:00

## 2022-08-29 RX ADMIN — AMLODIPINE BESYLATE 5 MG: 5 TABLET ORAL at 09:26

## 2022-08-29 RX ADMIN — ASPIRIN 325 MG: 325 TABLET, COATED ORAL at 09:27

## 2022-08-29 RX ADMIN — AMOXICILLIN AND CLAVULANATE POTASSIUM 1 TABLET: 875; 125 TABLET, FILM COATED ORAL at 09:27

## 2022-08-29 RX ADMIN — TRAMADOL HYDROCHLORIDE 25 MG: 50 TABLET ORAL at 09:41

## 2022-08-29 RX ADMIN — METOPROLOL SUCCINATE 50 MG: 50 TABLET, EXTENDED RELEASE ORAL at 09:27

## 2022-08-29 ASSESSMENT — PAIN SCALES - GENERAL
PAINLEVEL_OUTOF10: 2
PAINLEVEL_OUTOF10: 0
PAINLEVEL_OUTOF10: 5
PAINLEVEL_OUTOF10: 5
PAINLEVEL_OUTOF10: 6

## 2022-08-29 ASSESSMENT — PAIN DESCRIPTION - ORIENTATION: ORIENTATION: RIGHT

## 2022-08-29 ASSESSMENT — PAIN DESCRIPTION - LOCATION: LOCATION: HIP

## 2022-08-29 NOTE — DISCHARGE SUMMARY
Hospitalist Discharge Summary   Admit Date:  2022 12:12 AM   DC Note date: 2022  Name:  Bull Potter   Age:  80 y.o. Sex:  female  :  1929   MRN:  [id number]   Room:  [facility]  PCP:  Rosangela Davis MD    Presenting Complaint: Fall     Initial Admission Diagnosis: Fall from bed, initial encounter [W06. XXXA]  Closed fracture of neck of right femur, initial encounter ([facility] [S72.001A]  Hip fracture requiring operative repair, left, closed, initial encounter ([facility] [S72.002A]     Problem List for this Hospitalization (present on admission):    Principal Problem:    Hip fracture requiring operative repair, left, closed, initial encounter [facility])  Active Problems:    Pneumonia due to COVID-19 virus    Essential hypertension    Acquired hypothyroidism    CKD (chronic kidney disease) stage 3, GFR 30-59 ml/min ([facility])    Dementia ([facility]    Constipation  Resolved Problems:    * No resolved hospital problems. *      Hospital Course:  Bull Potter is a 80 y.o. female with medical history of Hypertension and hypothyroidism who presented with right hip pain after a fall. Patient lives at home with her daughter. She was trying to get out of bed and slipped out of bed and fell on the floor. She landed on her right hip. She felt the pain and could not get up. She was brought to emergency room. She was found to have fracture of her right hip. Ortho consulted. Patient underwent right hip open reduction and internal fixation on 2022. She had a fever on  but she denies symptoms. Says she feels well; wasn't aware of fever  No complaints. Pt denies coughing but daughter reports occasional coughing. No SOB,  CP, urinary symptoms. CXR showed possible early infiltrate LLL and was started on Augmentin here to prevent developing postop pneumonia. On room air during this stay. Patient tested + for COVID. She is not in any distress but this could explain fever and CXR finding.   Will let her continue on the abx anyway because + test does not exclude bacterial PNA. She has been stable awaiting rehab placement. Discharge delayed by insurance and facility. required PPD; would not accept CXR showing no evidence of active TB. Had insurance mixup as well; both of these nonmedical issues delayed discharge. Pt is stable to discharge to rehab today. Disposition: SNF  Diet: ADULT DIET; Regular  Code Status: Full Code    Follow Ups:   Contact information for follow-up providers     Yuly Lopez MD. Go to. Specialty: Internal Medicine  Why: when discharged from rehab  Contact information:  [facility] Internal Medicine an  [place] [address]  [phone]                   Contact information for after-discharge care     [facility] [address]) . Service: Skilled Nursing  Contact information:  [address] [place] [address] [phone]                           Time spent in patient discharge and coordination 36 minutes. Follow up labs/diagnostics (ultimately defer to outpatient provider):  CBC, BMP    Plan was discussed with pt, family. All questions answered. Patient was stable at time of discharge. Instructions given to call a physician or return if any concerns. Current Discharge Medication List        START taking these medications    Details   amoxicillin-clavulanate (AUGMENTIN) 875-125 MG per tablet Take 1 tablet by mouth 2 times daily for 7 days  Qty: 14 tablet, Refills: 0      aspirin 325 MG EC tablet Take 1 tablet by mouth daily for 21 days  Qty: 21 tablet, Refills: 0      traMADol (ULTRAM) 50 MG tablet Take 0.5 tablets by mouth every 6 hours as needed for Pain for up to 3 days.   Qty: 6 tablet, Refills: 0    Comments: Reduce doses taken as pain becomes manageable  Associated Diagnoses: Closed fracture of neck of right femur, initial encounter ([facility] [place] [facility]           CONTINUE these medications which have NOT CHANGED    Details   FLUoxetine (PROZAC) 10 MG capsule Take 40 mg by mouth daily      metoprolol succinate (TOPROL XL) 50 MG extended release tablet Take 50 mg by mouth daily      potassium chloride (KLOR-CON) 20 MEQ packet Take 20 mEq by mouth daily      traZODone (DESYREL) 100 MG tablet Take 100 mg by mouth nightly      amLODIPine (NORVASC) 5 MG tablet Take 5 mg by mouth daily             Procedures done this admission:  Procedure(s):  HIP OPEN REDUCTION INTERNAL FIXATION FNS RIGHT    Consults this admission:  FOLLOW UP VISIT  FOLLOW UP VISIT    Echocardiogram results:  No results found for this or any previous visit. Diagnostic Imaging/Tests:   XR HIP RIGHT (1 VIEW)    Result Date: 8/21/2022  Typical postsurgical changes. XR HIP RIGHT (2-3 VIEWS)    Result Date: 8/21/2022  Postsurgical change as described. CT HIP RIGHT WO CONTRAST    Result Date: 8/21/2022  There appears to be fracture in the anterolateral head of the right femur. The age of this fracture is unclear as there is no associated fluid or soft tissue edema. Please correlate clinically. XR HIP 2-3 VW W PELVIS RIGHT    Result Date: 8/21/2022  Findings as above. No results for input(s): CULTURE in the last 720 hours. Labs: Results:       BMP, Mg, Phos Recent Labs     08/28/22  0842   *   K 3.8      CO2 28   ANIONGAP 1*   BUN 18   CREATININE 1.00   LABGLOM 55*   GFRAA >60   CALCIUM 8.5   GLUCOSE 113*        CBC Recent Labs     08/28/22  0842   WBC 7.8   RBC 4.02*   HGB 11.1*   HCT 34.7*   MCV 86.3   MCH 27.6   MCHC 32.0   RDW 14.2      MPV 10.2   NRBC 0.00   SEGS 71   LYMPHOPCT 16   EOSRELPCT 1   MONOPCT 10   BASOPCT 1   IMMGRAN 1   SEGSABS 5.6   LYMPHSABS 1.2   EOSABS 0.1   MONOSABS 0.8   BASOSABS 0.0   ABSIMMGRAN 0.0        LFT No results for input(s): BILITOT, BILIDIR, ALKPHOS, AST, ALT, PROT, LABALBU, GLOB in the last 72 hours.    Cardiac  No results found for: Aly Choi Alicia Lab Results   Component Value Date/Time    PROTIME 13.2 05/20/2020 05:13 PM    INR 1.0 05/20/2020 05:13 PM      A1c No results found for: LABA1C, EAG   Lipids No results found for: CHOL, LDLCALC, LABVLDL, HDL, CHOLHDLRATIO, TRIG   Thyroid  No results found for: Cindy Dueñas     Most Recent UA Lab Results   Component Value Date/Time    COLORU CASA 06/18/2019 12:19 PM    SPECGRAV 1.022 06/18/2019 12:19 PM    PROTEINU TRACE 06/18/2019 12:19 PM    GLUCOSEU NEGATIVE  06/18/2019 12:19 PM    KETUA 15 06/18/2019 12:19 PM    BILIRUBINUR SMALL 06/18/2019 12:19 PM    BLOODU NEGATIVE  06/18/2019 12:19 PM    NITRU NEGATIVE  06/18/2019 12:19 PM    LEUKOCYTESUR MODERATE 06/18/2019 12:19 PM    WBCUA 10-20 06/18/2019 12:19 PM    RBCUA 0-3 06/18/2019 12:19 PM    BACTERIA TRACE 06/18/2019 12:19 PM          All Labs from Last 24 Hrs:  Recent Results (from the past 24 hour(s))   COVID-19, Rapid    Collection Time: 08/28/22 12:24 PM    Specimen: Nasopharyngeal   Result Value Ref Range    Source NASAL      SARS-CoV-2, Rapid Detected (AA) NOTD         No Known Allergies  Immunization History   Administered Date(s) Administered    PPD Test 05/20/2020, 08/23/2022       Recent Vital Data:  Patient Vitals for the past 24 hrs:   Temp Pulse Resp BP SpO2   08/29/22 1037 98.8 °F (37.1 °C) 66 19 (!) 165/73 95 %   08/29/22 0941 -- -- 18 -- --   08/29/22 0927 -- 69 -- 120/68 --   08/29/22 0926 -- -- -- 120/68 --   08/29/22 0717 99.5 °F (37.5 °C) 69 19 117/67 92 %   08/29/22 0344 98.6 °F (37 °C) 73 18 118/61 95 %   08/28/22 2328 99.1 °F (37.3 °C) 69 18 124/63 93 %   08/28/22 1920 98.8 °F (37.1 °C) 67 18 134/74 93 %   08/28/22 1508 98.2 °F (36.8 °C) 70 18 121/67 97 %   08/28/22 1110 98.2 °F (36.8 °C) 65 16 105/60 92 %         Oxygen Therapy  SpO2: 95 %  Pulse via Oximetry: 63 beats per minute  Pulse Oximeter Device Mode: Intermittent  Pulse Oximeter Device Location: Right  O2 Device: None (Room air)  O2 Flow Rate (L/min): 3 L/min    There is no height or weight on file to calculate BMI. Intake/Output Summary (Last 24 hours) at 8/29/2022 1049  Last data filed at 8/29/2022 0830  Gross per 24 hour   Intake 540 ml   Output --   Net 540 ml           Physical Exam:    General:    No overt distress  Head:  Normocephalic, atraumatic  Eyes:  Sclerae appear normal.  Pupils equally round. HENT:  Nares appear normal, no drainage. Moist mucous membranes  Neck:  No restricted ROM. Trachea midline  CV:   RRR. No JVD  Lungs:   CTAB. Respirations even, unlabored  Abdomen:   nondistended. Extremities: Warm and dry. No cyanosis or clubbing. No edema. Skin:     No rashes. Normal coloration  Neuro:  CN II-XII grossly intact. Psych:  Normal mood and affect. Signed:  Mary Corea MD    Part of this note may have been written by using a voice dictation software. The note has been proof read but may still contain some grammatical/other typographical errors.

## 2022-08-29 NOTE — PROGRESS NOTES
PHYSICAL THERAPY Re-evaluation and AM  (Link to Caseload Tracking: PT Visit Days : 1  Acknowledge Orders  Time In/Out  PT Charge Capture  Rehab Caseload Tracker    Becca Stevens is a 80 y.o. female   PRIMARY DIAGNOSIS: Hip fracture requiring operative repair, left, closed, initial encounter ([facility] [address]  Fall from bed, initial encounter [W06. XXXA]  Closed fracture of neck of right femur, initial encounter ([facility] [address] [S72.001A]  Hip fracture requiring operative repair, left, closed, initial encounter ([facility] [address] [S72.002A]  Procedure(s) (LRB):  HIP OPEN REDUCTION INTERNAL FIXATION FNS RIGHT (Right)  8 Days Post-Op  Reason for Referral: Pain in Right Hip (M25.551)  Difficulty in walking, Not elsewhere classified (R26.2)  History of falling (Z91.81)  Inpatient: Payor: [facility] / Plan: [employer] CHOICE-PPO MEDICARE / Product Type: *No Product type* /     ASSESSMENT:     REHAB RECOMMENDATIONS:   Recommendation to date pending progress:  Setting:  Short-term Rehab    Equipment:    Rolling Walker     ASSESSMENT:  Ms. Beth Dockery  is supine on arrival, newly COVID +, waiting rehab. Pt with R ORIF, WBAT. Pt on RA currently, stats >90% O2. Pt with some difficulty hearing and understanding tasks at times. Pt required increased assist for bed mobility, transfers, ambulation with RW. Pt with steppage gait pattern, antalgic on R, expresses pain with mobility. Pt making little progress with overall activity, PT to cont to follow for acute care needs.       [address] [facility] [facility] [place] [address] [address] AM-PAC 6 Clicks Basic Mobility Inpatient Short Form  AM-PAC Mobility Inpatient   How much difficulty turning over in bed?: A Lot  How much difficulty sitting down on / standing up from a chair with arms?: A Lot  How much difficulty moving from lying on back to sitting on side of bed?: A Lot  How much help from another person moving to and from a bed to a chair?: A Lot  How much help from another person needed to walk in hospital room?: A Lot  How much help from another [] [] [] [] [x]    Stand to Sit [] [] [] [] [] [x] [x] [] [] [] [x]     [] [] [] [] [] [] [] [] [] [] []    I=Independent, Mod I=Modified Independent, S=Supervision, SBA=Standby Assistance, CGA=Contact Guard Assistance,   Min=Minimal Assistance, Mod=Moderate Assistance, Max=Maximal Assistance, Total=Total Assistance, NT=Not Tested    GAIT: I Mod I S SBA CGA Min Mod Max Total  NT x2 Comments:   Level of Assistance [] [] [] [] [] [x] [] [] [] [] [x]    Distance   8' x 2    DME Gait Belt and Rolling Walker    Gait Quality Antalgic, Decreased kylie , Decreased step clearance, and Decreased step length    Weightbearing Status Lower Extremity Weight Bearing Restrictions  Right Lower Extremity Weight Bearing: Weight Bearing As Tolerated    Stairs      I=Independent, Mod I=Modified Independent, S=Supervision, SBA=Standby Assistance, CGA=Contact Guard Assistance,   Min=Minimal Assistance, Mod=Moderate Assistance, Max=Maximal Assistance, Total=Total Assistance, NT=Not Tested    PLAN:   ACUTE PHYSICAL THERAPY GOALS:   (Developed with and agreed upon by patient and/or caregiver.)  Goals assessed and revised as needed 8/29/22:  (1.) Jamila Willoughby  will move from supine to sit and sit to supine  with MINIMAL ASSIST within 7 treatment day(s). (2.) Jamila Willoughby will transfer from bed to chair and chair to bed with STAND BY ASSIST using the least restrictive device within 7 treatment day(s). (3.) Jamila Willoughby will ambulate with STAND BY ASSIST for 50 feet with the least restrictive device within 7 treatment day(s). (4.) Jamila Willoughby will perform standing static and dynamic balance activities x 10 minutes with SUPERVISION to improve safety within 7 treatment day(s). (5.) Jamila Willoughby will perform bilateral lower extremity exercises x 20 min for HEP with INDEPENDENCE to improve strength, endurance, and functional mobility within 7 treatment day(s).        FREQUENCY AND DURATION: BID for duration of hospital stay or until

## 2022-08-29 NOTE — CARE COORDINATION
MSN, CM:  patient to be discharged today to [place] [address] for rehab services. Patient and family agree with this discharge plan. Patient has met all milestones for this admission. Primary RN spoke with family about transfer today. [facility] to transport patient to facility. 08/29/22 1206   Service Assessment   Patient Orientation Alert and [address] Discharge   Transition of Care Consult (CM Consult) SNF  ([place] [address])   Partner SNF Yes   Mode of Transport at Discharge BLS  ([facility])   Hospital Transport Time of Discharge 1400   Condition of Participation: Discharge Planning   The Plan for Transition of Care is related to the following treatment goals: Short term rehab to regain strenght back to baseline   The Patient and/or Patient Representative was provided with a Choice of Provider? Patient   The Patient and/Or Patient Representative agree with the Discharge Plan? Yes   Freedom of Choice list was provided with basic dialogue that supports the patient's individualized plan of care/goals, treatment preferences, and shares the quality data associated with the providers?   Yes

## 2022-08-29 NOTE — PROGRESS NOTES
OCCUPATIONAL THERAPY: Daily Note    OT Visit Days: 5   Time  OT Charge Capture  Rehab Caseload Tracker  OT Orders    Steve Agarwal is a 80 y.o. female   PRIMARY DIAGNOSIS: Hip fracture requiring operative repair, left, closed, initial encounter ([facility] [address]  Fall from bed, initial encounter [W06. XXXA]  Closed fracture of neck of right femur, initial encounter ([facility] [address] [S72.001A]  Hip fracture requiring operative repair, left, closed, initial encounter ([facility] [address] [S72.002A]  Procedure(s) (LRB):  HIP OPEN REDUCTION INTERNAL FIXATION FNS RIGHT (Right)  8 Days Post-Op  Inpatient: Payor: [facility] / Plan: Farzaneh Postin / Product Type: *No Product type* /     ASSESSMENT:     REHAB RECOMMENDATIONS: CURRENT LEVEL OF FUNCTION:  (Most Recently Demonstrated)   Recommendation to date pending progress:  Setting:  Short-term Rehab    Equipment:    To Be Determined Bathing:  Not Tested  Dressing:  Maximal Assist  Feeding/Grooming:  Minimal Assist-moderate assistance  Toileting:  Not Tested  Functional Mobility:  Minimal Assist-moderate assistance x2     ASSESSMENT:  Ms. Sangita Sosa is progressing well towards OT goals. Today, pt was received supine in bed. Completed bed mobility Simon. MaxA for LB dressing. Sit>stand Simon x2. Ambulated with Simon x2 but progressed too modA x2 as she fatigued. Completed grooming tasks standing at the sink Simon but progressed to modA. Pt then transitioned to sitting for remainder of grooming tasks. Pt tested positive for COVID prior to d/c to rehab--on room air. Ms. Sangita Sosa continues to demonstrate overall deficits in strength, balance, activity tolerance, and ADL performance. Continue OT efforts and POC in order to address functional deficits and OT goals stated above. SUBJECTIVE:     Ms. Sangita Sosa states, \"I am too lazy for this. \"     Social/Functional Lives With: Family, Daughter  Type of Home: House  Home Layout: Two level, Performs ADL's on one level  Home Access: Level entry  Home Equipment: Cal Spore, rolling, Wheelchair-manual  Has the patient had two or more falls in the past year or any fall with injury in the past year?: Yes  ADL Assistance: Needs assistance  Ambulation Assistance: Needs assistance  Transfer Assistance: Independent    OBJECTIVE:     Cathy Carter / Reyes Unger / Hosea Curry: NA    RESTRICTIONS/PRECAUTIONS:  Restrictions/Precautions  Restrictions/Precautions: Fall Risk, Weight Bearing  Lower Extremity Weight Bearing Restrictions  Right Lower Extremity Weight Bearing: Weight Bearing As Tolerated        PAIN: VITALS / O2:   Pre Treatment:              Post Treatment: no change  Vitals          Oxygen            MOBILITY: I Mod I S SBA CGA Min Mod Max Total  NT x2 Comments:   Bed Mobility    Rolling [] [] [] [] [] [] [] [] [] [x] []    Supine to Sit [] [] [] [] [] [x] [] [] [] [] []    Scooting [] [] [] [] [] [x] [] [] [] [] []    Sit to Supine [] [] [] [] [] [] [] [] [] [x] [] Left sitting in the chair    Transfers    Sit to Stand [] [] [] [] [] [x] [] [] [] [] [x]    Bed to Chair [] [] [] [] [] [x] [] [] [] [] [x] Progressed to Carvel Prima x2 as she fatigued    Stand to Sit [] [] [] [] [] [x] [] [] [] [] []    Tub/Shower [] [] [] [] [] [] [] [] [] [x] []     Toilet [] [] [] [] [] [] [] [] [] [x] []      [] [] [] [] [] [] [] [] [] [] []    I=Independent, Mod I=Modified Independent, S=Supervision/Setup, SBA=Standby Assistance, CGA=Contact Guard Assistance, Min=Minimal Assistance, Mod=Moderate Assistance, Max=Maximal Assistance, Total=Total Assistance, NT=Not Tested    ACTIVITIES OF DAILY LIVING: I Mod I S SBA CGA Min Mod Max Total NT Comments   BASIC ADLs:              Upper Body   Bathing [] [] [] [] [] [] [] [] [] [x]    Lower Body Bathing [] [] [] [] [] [] [] [] [] [x]    Toileting [] [] [] [] [] [] [] [] [] [x]    Upper Body Dressing [] [] [] [] [] [] [] [] [] [x]    Lower Body Dressing [] [] [] [] [] [] [] [x] [] [] Socks    Feeding [] [] [] [] [] [] [] [] [] [x]    Grooming [] [] [] [] [] [] [] [] [] [] modA to brush teeth standing at the sink but progressed sitting, Simon for initiation and follow through of task   Personal Device Care [] [] [] [] [] [] [] [] [] [x]    Functional Mobility [] [] [] [] [] [] [] [] [] [] Simon x2 but progressed to modA x2 as she fatigued    I=Independent, Mod I=Modified Independent, S=Supervision/Setup, SBA=Standby Assistance, CGA=Contact Guard Assistance, Min=Minimal Assistance, Mod=Moderate Assistance, Max=Maximal Assistance, Total=Total Assistance, NT=Not Tested    BALANCE: Good Fair+ Fair Fair- Poor NT Comments   Sitting Static [] [x] [] [] [] []    Sitting Dynamic [] [x] [] [] [] []              Standing Static [] [] [x] [] [] []    Standing Dynamic [] [] [] [x] [] []        PLAN:     FREQUENCY/DURATION   OT Plan of Care: 5 times/week for duration of hospital stay or until stated goals are met, whichever comes first.    ACUTE OCCUPATIONAL THERAPY GOALS:   (Developed with and agreed upon by patient and/or caregiver.)  1. Patient will complete lower body bathing and dressing with minimal assistance and adaptive equipment as needed. 2. Patient will complete toileting with minimal assistance. 3. Patient will tolerate 30 minutes of OT treatment with 2-3 rest breaks to increase activity tolerance for ADLs. 4. Patient will complete functional transfers with minimal assistance and adaptive equipment as needed. 5. Patient will complete functional mobility for household distances with minimal assistance and appropriate safety awareness.       Timeframe: 7 visits     TREATMENT:     TREATMENT:   Co-Treatment PT/OT necessary due to patient's decreased overall endurance/tolerance levels, as well as need for high level skilled assistance to complete functional transfers/mobility and functional tasks  Neuromuscular Re-education (23 Minutes): Neuromuscular Re-education included Balance Training, Coordination training, Postural training, Sitting balance training, and Standing balance

## 2022-08-29 NOTE — PROGRESS NOTES
Removed peripheral IV. Called SBAR report to LORI Rea at [facility]. Opportunity for questions and clarification provided.   Estimated pickup time by [facility] is 2pm.

## 2022-08-30 ENCOUNTER — CARE COORDINATION (OUTPATIENT)
Dept: CARE COORDINATION | Facility: CLINIC | Age: 87
End: 2022-08-30

## 2022-08-30 NOTE — CARE COORDINATION
Patient discharged to [facility] on 8/29/22. MARLENY outreach postponed for 21 days due to discharge SNF.

## 2022-09-20 ENCOUNTER — CARE COORDINATION (OUTPATIENT)
Dept: CARE COORDINATION | Facility: CLINIC | Age: 87
End: 2022-09-20

## 2022-09-20 NOTE — CARE COORDINATION
Care Transitions Outreach Attempt    Call within 2 business days of discharge: Unknown   Attempted to reach patient for transitions of care follow up. Unable to reach patient. Patient: Allie Grant Patient : 1929 MRN: [id number]    Last Discharge [facility]       Date Complaint Diagnosis Description Type Department Provider    22 Fall Closed fracture of neck of right femur, initial encounter ([facility] [address] . .. ED to Hosp-Admission (Discharged) (ADMITTED) [id number] Korey Loaiza MD; Leticia Anderson. .. Was this an external facility discharge?  Yes, discharge date unknown Discharge Facility: [address] [place] [address]    Noted following upcoming appointments from discharge chart review:   [facility] follow up appointment(s):   Future Appointments   Date Time Provider Dora Agarwal   2022  9:00 AM CHERYL Francis [facility]B     Non-[facility] follow up appointment(s): Mariaa Bhatt NP (Dr Gonzalez Covert) Gris Winter, 2/15/23

## 2022-09-20 NOTE — CARE COORDINATION
Care Transitions Outreach Attempt    Call within 2 business days of discharge: unknown  Attempted to reach patient for transitions of care follow up. Unable to reach patient. Patient: Kristen Blake Patient : 1929 MRN: [id number]    Last Discharge [facility]       Date Complaint Diagnosis Description Type Department Provider    22 Fall Closed fracture of neck of right femur, initial encounter ([facility] [address] . .. ED to Hosp-Admission (Discharged) (ADMITTED) [facility] Freddie Maharaj MD; Jeff Lopez Ba. .. Was this an external facility discharge?  yes Discharge Facility: [address] [place] [address]    Noted following upcoming appointments from discharge chart review:   [facility] follow up appointment(s):   Future Appointments   Date Time Provider Dora Agarwal   2022  9:00 AM CHERYL Rivers [facility] AMB     Non-[facility] follow up appointment(s): Dr Jessica Garland, 2/15/23

## 2022-09-26 ENCOUNTER — TELEPHONE (OUTPATIENT)
Dept: ORTHOPEDIC SURGERY | Age: 87
End: 2022-09-26

## 2022-09-26 NOTE — TELEPHONE ENCOUNTER
Call Jessica Mcmahan RN with [facility] [phone], patient had hip surg 8/21 s/p fall, still has 9 staples in.

## 2022-09-26 NOTE — TELEPHONE ENCOUNTER
Returned phone call. LVM. Patient was discharged post operatively to [facility]. I am unsure why they did not bring patient to postop appointment. If there is a valid reason for not keeping post op appointment they should have called to ask for staple removal orders. Patient has appointment Thursday, we are happy to see tomorrow. Left my direct line for Christiane Lennox to call back.

## 2022-10-07 ENCOUNTER — TELEPHONE (OUTPATIENT)
Dept: ORTHOPEDIC SURGERY | Age: 87
End: 2022-10-07

## 2022-10-13 ENCOUNTER — TELEPHONE (OUTPATIENT)
Dept: ORTHOPEDIC SURGERY | Age: 87
End: 2022-10-13

## 2022-10-19 ENCOUNTER — CLINICAL DOCUMENTATION (OUTPATIENT)
Dept: ORTHOPEDIC SURGERY | Age: 87
End: 2022-10-19

## 2022-10-19 NOTE — PROGRESS NOTES
Patient had surgery 8/21/22. Never been seen in office. No Show/Re-scheduled x 4. Please do not reschedule without approval from downtown office.

## 2022-10-20 ENCOUNTER — OFFICE VISIT (OUTPATIENT)
Dept: ORTHOPEDIC SURGERY | Age: 87
End: 2022-10-20

## 2022-10-20 DIAGNOSIS — S72.002A HIP FRACTURE REQUIRING OPERATIVE REPAIR, LEFT, CLOSED, INITIAL ENCOUNTER (HCC): Primary | ICD-10-CM

## 2022-10-20 PROCEDURE — 99024 POSTOP FOLLOW-UP VISIT: CPT | Performed by: ORTHOPAEDIC SURGERY

## 2022-10-20 NOTE — PROGRESS NOTES
Progress Note    Patient: Miranda Durham MRN: [id number]  SSN: [id number]    YOB: 1929  Age: 80 y.o. Sex: female        10/20/2022      Subjective:     Patient is now about weeks out from plate and screw fixation of a right femoral neck fracture with a [employer] femoral neck system. She is here with her family. Her daughter is with her and she says she think she is doing pretty well she has some occasional pain in her hip but in general this is improving and she think she is getting around reasonably well. She has not been doing great over the last week or so but she attributes that to her being weak after some gastrointestinal illness    Objective: There were no vitals filed for this visit. Physical Exam:     Skin - incision is well healed with no redness or drainage  Motor and sensory function intact in RIGHT LOWER extremity  Pulses palpable in RIGHT LOWER extremity     XRAY FINDINGS:  Itorvqhlpm-[id number] right femoral neck fracture, findings-AP and lateral views of the right hip shows the right femoral neck system is in excellent position. The overall alignment of the femoral neck is very reasonable and shows very minimal changes compared to immediate postoperative films. This appears to be very stable and does show early evidence of healing. There is no evidence of any collapse of the femoral head impression is healing well aligned right femoral neck fracture    Assessment:     Well aligned healing right femoral neck fracture    Plan:     I think the patient can essentially be activity as tolerated. I have explained to her daughter that if she has any issues or worsening of pain I be happy to see her again but as long as she is doing well clinically I do not think there is any reason for her to follow-up.     Signed By: Buddy Abebe MD     October 20, 2022

## 2022-10-31 ENCOUNTER — HOSPITAL ENCOUNTER (OUTPATIENT)
Dept: LAB | Age: 87
Discharge: HOME | End: 2022-11-03
Payer: MEDICARE

## 2022-10-31 LAB
ALBUMIN SERPL-MCNC: 3.1 G/DL (ref 3.2–4.6)
ALBUMIN/GLOB SERPL: 0.8 {RATIO} (ref 0.4–1.6)
ALP SERPL-CCNC: 63 U/L (ref 50–130)
ALT SERPL-CCNC: 26 U/L (ref 12–65)
ANION GAP SERPL CALC-SCNC: 7 MMOL/L (ref 2–11)
AST SERPL-CCNC: 32 U/L (ref 15–37)
BASOPHILS # BLD: 0 K/UL (ref 0–0.2)
BASOPHILS NFR BLD: 1 % (ref 0–2)
BILIRUB SERPL-MCNC: 0.6 MG/DL (ref 0.2–1.1)
BUN SERPL-MCNC: 15 MG/DL (ref 8–23)
CALCIUM SERPL-MCNC: 9.3 MG/DL (ref 8.3–10.4)
CHLORIDE SERPL-SCNC: 104 MMOL/L (ref 101–110)
CO2 SERPL-SCNC: 23 MMOL/L (ref 21–32)
CREAT SERPL-MCNC: 0.94 MG/DL (ref 0.6–1)
DIFFERENTIAL METHOD BLD: ABNORMAL
EOSINOPHIL # BLD: 0.1 K/UL (ref 0–0.8)
EOSINOPHIL NFR BLD: 1 % (ref 0.5–7.8)
ERYTHROCYTE [DISTWIDTH] IN BLOOD BY AUTOMATED COUNT: 15.8 % (ref 11.9–14.6)
GLOBULIN SER CALC-MCNC: 4.1 G/DL (ref 2.8–4.5)
GLUCOSE SERPL-MCNC: 77 MG/DL (ref 65–100)
HCT VFR BLD AUTO: 37.4 % (ref 35.8–46.3)
HGB BLD-MCNC: 11.8 G/DL (ref 11.7–15.4)
IMM GRANULOCYTES # BLD AUTO: 0 K/UL (ref 0–0.5)
IMM GRANULOCYTES NFR BLD AUTO: 0 % (ref 0–5)
LYMPHOCYTES # BLD: 1.1 K/UL (ref 0.5–4.6)
LYMPHOCYTES NFR BLD: 22 % (ref 13–44)
MAGNESIUM SERPL-MCNC: 2.1 MG/DL (ref 1.8–2.4)
MCH RBC QN AUTO: 27.9 PG (ref 26.1–32.9)
MCHC RBC AUTO-ENTMCNC: 31.6 G/DL (ref 31.4–35)
MCV RBC AUTO: 88.4 FL (ref 82–102)
MONOCYTES # BLD: 0.4 K/UL (ref 0.1–1.3)
MONOCYTES NFR BLD: 7 % (ref 4–12)
NEUTS SEG # BLD: 3.5 K/UL (ref 1.7–8.2)
NEUTS SEG NFR BLD: 69 % (ref 43–78)
NRBC # BLD: 0 K/UL (ref 0–0.2)
PLATELET # BLD AUTO: 281 K/UL (ref 150–450)
PMV BLD AUTO: 11.5 FL (ref 9.4–12.3)
POTASSIUM SERPL-SCNC: 3.9 MMOL/L (ref 3.5–5.1)
PROT SERPL-MCNC: 7.2 G/DL (ref 6.3–8.2)
RBC # BLD AUTO: 4.23 M/UL (ref 4.05–5.2)
SODIUM SERPL-SCNC: 134 MMOL/L (ref 133–143)
WBC # BLD AUTO: 5 K/UL (ref 4.3–11.1)

## 2022-10-31 PROCEDURE — 83735 ASSAY OF MAGNESIUM: CPT

## 2022-10-31 PROCEDURE — 85025 COMPLETE CBC W/AUTO DIFF WBC: CPT

## 2022-10-31 PROCEDURE — 36415 COLL VENOUS BLD VENIPUNCTURE: CPT

## 2022-10-31 PROCEDURE — 80053 COMPREHEN METABOLIC PANEL: CPT

## 2023-05-10 RX ORDER — ACETAMINOPHEN 325 MG/1
650 TABLET ORAL EVERY 4 HOURS PRN
COMMUNITY
Start: 2020-05-28

## 2023-05-10 RX ORDER — B-COMPLEX WITH VITAMIN C
1 TABLET ORAL
COMMUNITY
Start: 2020-05-28

## 2023-05-10 RX ORDER — CYPROHEPTADINE HYDROCHLORIDE 4 MG/1
TABLET ORAL DAILY
COMMUNITY

## 2023-05-10 RX ORDER — BUSPIRONE HYDROCHLORIDE 5 MG/1
5 TABLET ORAL 2 TIMES DAILY
COMMUNITY
Start: 2020-05-28

## 2023-05-10 RX ORDER — POTASSIUM CHLORIDE 20 MEQ/1
TABLET, EXTENDED RELEASE ORAL
COMMUNITY

## 2023-05-10 RX ORDER — MAGNESIUM HYDROXIDE/ALUMINUM HYDROXICE/SIMETHICONE 120; 1200; 1200 MG/30ML; MG/30ML; MG/30ML
30 SUSPENSION ORAL EVERY 4 HOURS PRN
COMMUNITY
Start: 2020-05-28

## 2024-09-11 NOTE — PROGRESS NOTES
Problem: Mobility Impaired (Adult and Pediatric)  Goal: *Acute Goals and Plan of Care (Insert Text)  Description: LTG:  (1.)Ms. Juliano Díaz will move from supine to sit and sit to supine, scoot up and down and roll side to side with MINIMAL ASSIST within 7 treatment day(s). (2.)Ms. Juliano Díaz will transfer from bed to chair and chair to bed with MINIMAL ASSIST using the least restrictive device within 7 treatment day(s). (3.)Ms. Juliano Díaz will ambulate with MINIMAL ASSIST for 10 feet with the least restrictive device within 7 treatment day(s). (4.)Ms. Juliano Díaz will perform exercises per HEP for 15+ minutes to improve strength and mobility within 7 days. ________________________________________________________________________________________________   Outcome: Progressing Towards Goal     PHYSICAL THERAPY: Daily Note and PM 5/25/2020  INPATIENT: PT Visit Days : 4  Payor: Jackie Eric / Plan: [facility] [employer] MEDICARE CHOICE PPO/PFFS / Product Type: Managed Care Medicare /       NAME/AGE/GENDER: Marta Moya is a 80 y.o. female   PRIMARY DIAGNOSIS: Closed left hip fracture (HCC) [S72.002A]  Constipation [K59.00] Closed left hip fracture (HCC) Closed left hip fracture (HCC)  Procedure(s) (LRB):  FEMORAL HEAD FRACTURE OPEN REDUCTION INTERNAL FIXATION Left (Left)  4 Days Post-Op  ICD-10: Treatment Diagnosis:    · Generalized Muscle Weakness (M62.81)  · Difficulty in walking, Not elsewhere classified (R26.2)  · Other abnormalities of gait and mobility (R26.89)  · History of falling (Z91.81)   Precaution/Allergies:  Patient has no known allergies. ASSESSMENT:     Ms. Juliano Díaz is a pleasantly confused 80year old female admitted from home with left hip fracture and is seen s/p ORIF. WBAT per ortho orders. Per chart review, pt lives with daughter who is her caregiver. She is typically ambulatory for household distances slowly without DME use, does have cane available.      The patient is seated in the recliner chair from AM session and agreeable to treatment and BTB. The patient scooted forward with CGA and performed sit to stand with mod A and additional time. She ambulated 3' w/ RW and mod A with max cueing for safety awareness in maintaining hands on the walker. Patient was more confused this PM requiring increased cueing for safety and focus on task. The patient then scooted back in bed with CGA and performed sit to supine with min A and verbal/tactile cues. She was positioned for comfort with needs in reach and patient education on safety and discharge options. Overall the patient is making slow progress toward therapy goals as indicated by decreased assistance levels. Will continue skilled PT treatment as patient is still below functional baseline. This section established at most recent assessment   PROBLEM LIST (Impairments causing functional limitations):  1. Decreased Strength  2. Decreased Transfer Abilities  3. Decreased Ambulation Ability/Technique  4. Decreased Balance  5. Increased Pain  6. Decreased Activity Tolerance  7. Decreased Pacing Skills  8. Decreased Flexibility/Joint Mobility  9. Decreased Knowledge of Precautions  10. Decreased Skin Integrity/Hygeine  11. Decreased Cognition   INTERVENTIONS PLANNED: (Benefits and precautions of physical therapy have been discussed with the patient.)  1. Balance Exercise  2. Bed Mobility  3. Gait Training  4. Home Exercise Program (HEP)  5. Therapeutic Activites  6. Therapeutic Exercise/Strengthening  7. Transfer Training     TREATMENT PLAN: Frequency/Duration: 3 times a week for duration of hospital stay  Rehabilitation Potential For Stated Goals: Good     REHAB RECOMMENDATIONS (at time of discharge pending progress):    Placement: It is my opinion, based on this patient's performance to date, that Ms. Justina Graves may benefit from intensive therapy at a [address] [place] [address] after discharge due to the functional deficits listed above that are likely to improve with skilled rehabilitation and concerns that he/she may be unsafe to be unsupervised at home due to weakness, fall risk, need for heavy assistance. Equipment:    tbd pending progress              HISTORY:   History of Present Injury/Illness (Reason for Referral):  Per H&P, \"[address]year-old  female patient with a known history of hypertension, dementia, hypothyroidism and osteoporosis. Patient was seen yesterday in emergency room for history of fall. She had 1 more fall last night and brought to the emergency room again for further evaluation . Patient has been complaining of left hip pain.     Apart from fall and left hip pain of the 10 directions apart from the one mentioned above patient other review of systems were negative noncontributory.     WBC of 11.5, creatinine of 1.28, GFR of 50. Left hip x-ray-  Impression: Question acute nondisplaced transcervical fracture of the left  femoral neck.  MRI of the left hip without contrast may be more sensitive in  Evaluation.     Patient will be transferred to [facility] for further management of left hip fracture and constipation(based on the x-ray KUB done yesterday). \"  Past Medical History/Comorbidities:   Ms. Juliano Díaz  has a past medical history of Anxiety, Hematuria, microscopic, Hypercholesteremia, Hypertension, Hypothyroidism, IBS (irritable bowel syndrome), Osteoporosis, Sciatica, Short-term memory loss, and Sleep apnea. Ms. Juliano Díaz  has a past surgical history that includes hx hysterectomy; hx svt ablation; and hx vitrectomy (Bilateral). Social History/Living Environment:   Home Environment: Private residence  Living Alone: No  Support Systems: Family member(s), Child(marv)  Patient Expects to be Discharged to[de-identified] Unknown  Current DME Used/Available at Home: Shower chair, Walker, rolling  Tub or Shower Type: Shower  Prior Level of Function/Work/Activity:  Lives with daughter who is her caregiver and assists with ADLs, mobility.  Per chart review, pt has walker but does not typically use. Ambulates slowly in hallway without DME use. Number of Personal Factors/Comorbidities that affect the Plan of Care: 3+: HIGH COMPLEXITY   EXAMINATION:   Most Recent Physical Functioning:   Gross Assessment:                  Posture:     Balance:  Sitting: Intact  Sitting - Static: Good (unsupported)  Sitting - Dynamic: Good (unsupported); Fair (occasional)  Standing: Impaired  Standing - Static: Poor  Standing - Dynamic : Poor Bed Mobility:  Supine to Sit: Moderate assistance  Sit to Supine: Minimum assistance  Scooting: Contact guard assistance  Interventions: Verbal cues; Tactile cues; Safety awareness training  Wheelchair Mobility:     Transfers:  Sit to Stand: Minimum assistance  Stand to Sit: Minimum assistance  Bed to Chair: Moderate assistance; Additional time  Interventions: Verbal cues; Safety awareness training  Gait:     Base of Support: Shift to right  Speed/Manasa: Slow;Shuffled  Step Length: Right shortened;Left shortened  Gait Abnormalities: Antalgic;Decreased step clearance;Trunk sway increased  Distance (ft): 3 Feet (ft)  Assistive Device: Walker, rolling;Gait belt  Ambulation - Level of Assistance: Moderate assistance;Minimal assistance; Additional time  Interventions: Verbal cues; Safety awareness training; Tactile cues      Body Structures Involved:  1. Eyes and Ears  2. Bones  3. Joints  4. Muscles Body Functions Affected:  1. Mental  2. Sensory/Pain  3. Neuromusculoskeletal  4. Movement Related Activities and Participation Affected:  1. General Tasks and Demands  2. Mobility  3. Domestic Life  4.  Community, Social and [place]   Number of elements that affect the Plan of Care: 4+: HIGH COMPLEXITY   CLINICAL PRESENTATION:   Presentation: Evolving clinical presentation with changing clinical characteristics: MODERATE COMPLEXITY   CLINICAL DECISION MAKIN [facility] Inpatient Short Form  How much difficulty does the patient currently have. .. Unable A Lot A Little None   1. Turning over in bed (including adjusting bedclothes, sheets and blankets)? [] 1   [x] 2   [] 3   [] 4   2. Sitting down on and standing up from a chair with arms ( e.g., wheelchair, bedside commode, etc.)   [] 1   [x] 2   [] 3   [] 4   3. Moving from lying on back to sitting on the side of the bed? [] 1   [x] 2   [] 3   [] 4   How much help from another person does the patient currently need. .. Total A Lot A Little None   4. Moving to and from a bed to a chair (including a wheelchair)? [] 1   [x] 2   [] 3   [] 4   5. Need to walk in hospital room? [x] 1   [] 2   [] 3   [] 4   6. Climbing 3-5 steps with a railing? [x] 1   [] 2   [] 3   [] 4   © 2007, Trustees of [facility] [place] [employer], under license to [employer]. All rights reserved      Score:  Initial: 10 Most Recent: X (Date: -- )    Interpretation of Tool:  Represents activities that are increasingly more difficult (i.e. Bed mobility, Transfers, Gait). Medical Necessity:     · Patient demonstrates good rehab potential due to higher previous functional level. Reason for Services/Other Comments:  · Patient continues to demonstrate capacity to improve strength, mobility, balance, transfers, and activity tolerance which will increase independence, decrease amount of assistance required from caregiver and increase safety.    Use of outcome tool(s) and clinical judgement create a POC that gives a: Questionable prediction of patient's progress: MODERATE COMPLEXITY            TREATMENT:   (In addition to Assessment/Re-Assessment sessions the following treatments were rendered)   Pre-treatment Symptoms/Complaints:  \"I can't do much\"  Pain: Initial:   Pain Intensity 1: 0  Post Session:  0/10, in bed         Therapeutic Activity: (   23 min ): Therapeutic activities including Bed transfers, Chair transfers, bed mobility, patient education, static/dynamic standing balance, and Ambulation on level ground to improve mobility, strength, balance, coordination and activity tolerance. Required moderate assist Verbal cues; Safety awareness training; Tactile cues to promote static and dynamic balance in standing and promote motor control of bilateral, lower extremity(s). Therapeutic Exercise: (   min):  Exercises per grid below to improve mobility and strength. Required moderate verbal and tactile cues to promote proper body alignment, promote proper body posture and promote proper body mechanics. Progressed resistance, range and repetitions as indicated. Date:  5/23 Date:  5/25 Date:     Activity/Exercise Seated Parameters Parameters Parameters   Heel raises X 20 B x20    Toe raises X 20 B x20    LAQ's X 20 B x20    Hip Flex X 20 B x10    Hip ABD X 20 B                           Braces/Orthotics/Lines/Etc:   · purewick  · O2 Device: Room air  Treatment/Session Assessment:    · Response to Treatment:  See above, poor standing balance/safety awareness    · Interdisciplinary Collaboration:   o Physical Therapy Assistant  o Registered Nurse  · After treatment position/precautions:   o Supine in bed  o Bed/Chair-wheels locked  o Bed in low position  o Call light within reach  o RN notified   · Compliance with Program/Exercises: Will assess as treatment progresses  · Recommendations/Intent for next treatment session: \"Next visit will focus on advancements to more challenging activities and reduction in assistance provided\".   Total Treatment Duration:  PT Patient Time In/Time Out  Time In: 1248  Time Out: [address] [place] [address] Ambulatory

## (undated) DEVICE — DRAPE SHT 3 QTR PROXIMA 53X77 --

## (undated) DEVICE — SLIM BODY SKIN STAPLER: Brand: APPOSE ULC

## (undated) DEVICE — 3.2MM GUIDE WIRE 400MM

## (undated) DEVICE — 7 DAY SILVER-COATED ANTIMICROBIAL BARRIER DRESSING: Brand: ACTICOAT 7  4" X 5"

## (undated) DEVICE — SUTURE MCRYL SZ 2-0 L27IN ABSRB UD SH L26MM TAPERPOINT NDL Y417H

## (undated) DEVICE — INTENDED FOR TISSUE SEPARATION, AND OTHER PROCEDURES THAT REQUIRE A SHARP SURGICAL BLADE TO PUNCTURE OR CUT.: Brand: BARD-PARKER SAFETY BLADES SIZE 10, STERILE

## (undated) DEVICE — Device

## (undated) DEVICE — TRAY PREP DRY W/ PREM GLV 2 APPL 6 SPNG 2 UNDPD 1 OVERWRAP

## (undated) DEVICE — GUIDEWIRE ORTH L400MM DIA3.2MM FOR TFN

## (undated) DEVICE — (D)PREP SKN CHLRAPRP APPL 26ML -- CONVERT TO ITEM 371833

## (undated) DEVICE — KENDALL SCD EXPRESS SLEEVES, KNEE LENGTH, MEDIUM: Brand: KENDALL SCD

## (undated) DEVICE — DRAPE PT ISOLATN 130 IN X 96 IN

## (undated) DEVICE — SUTURE VCRL SZ 2-0 L18IN ABSRB VLT L26MM CT-2 1/2 CIR J726D

## (undated) DEVICE — CONTAINER,SPECIMEN,O.R.STRL,4.5OZ: Brand: MEDLINE

## (undated) DEVICE — ROD RMR L950MM DIA2.5MM W/ EXTN BALL TIP

## (undated) DEVICE — SOL IRR SOD CL 0.9% 1000ML BTL --

## (undated) DEVICE — DRAPE,U/SHT,SPLIT,FILM,60X84,STERILE: Brand: MEDLINE

## (undated) DEVICE — REM POLYHESIVE ADULT PATIENT RETURN ELECTRODE: Brand: VALLEYLAB

## (undated) DEVICE — 3M™ IOBAN™ 2 ANTIMICROBIAL INCISE DRAPE 6650EZ: Brand: IOBAN™ 2

## (undated) DEVICE — SOLUTION IRRIG 3000ML H2O STRL BAG

## (undated) DEVICE — 2000CC GUARDIAN II: Brand: GUARDIAN

## (undated) DEVICE — DRAPE C-ARMOUR C-ARM KIT --

## (undated) DEVICE — (D)SYR 10ML 1/5ML GRAD NSAF -- PKGING CHANGE USE ITEM 338027

## (undated) DEVICE — 4.3MM DRILL BIT LENGTH 413MM

## (undated) DEVICE — PAD,ABDOMINAL,5"X9",ST,LF,25/BX: Brand: MEDLINE INDUSTRIES, INC.

## (undated) DEVICE — SPONGE GZ W4XL4IN COT 12 PLY TYP VII WVN C FLD DSGN

## (undated) DEVICE — SUTURE ABSORBABLE BRAIDED 0 CT-1 8X27 IN UD VICRYL JJ41G

## (undated) DEVICE — SOLUTION IV 1000ML 0.9% SOD CHL

## (undated) DEVICE — CYSTO: Brand: MEDLINE INDUSTRIES, INC.

## (undated) DEVICE — CATHETER URET 5FR L70CM TIP 8FR OPN END CONE TIP INJ HUB

## (undated) DEVICE — SHEET, DRAPE, SPLIT, STERILE: Brand: MEDLINE

## (undated) DEVICE — TFN: Brand: MEDLINE INDUSTRIES, INC.

## (undated) DEVICE — BIT DRL L413MM DIA4.3MM FOR FEM NK SYSTEN